# Patient Record
Sex: MALE | Race: WHITE | Employment: OTHER | URBAN - NONMETROPOLITAN AREA
[De-identification: names, ages, dates, MRNs, and addresses within clinical notes are randomized per-mention and may not be internally consistent; named-entity substitution may affect disease eponyms.]

---

## 2017-02-03 ENCOUNTER — HOSPITAL ENCOUNTER (OUTPATIENT)
Dept: LAB | Age: 82
Discharge: HOME | End: 2017-02-03
Attending: INTERNAL MEDICINE | Admitting: INTERNAL MEDICINE

## 2017-02-03 LAB
ALBUMIN SERPL-MCNC: 4.2 GM/DL (ref 3.4–5)
ALP BLD-CCNC: 41 IU/L (ref 40–129)
ALT SERPL-CCNC: 32 U/L (ref 10–40)
ANION GAP SERPL CALCULATED.3IONS-SCNC: 4 MMOL/L (ref 4–16)
AST SERPL-CCNC: 29 IU/L (ref 15–37)
BILIRUB SERPL-MCNC: 0.6 MG/DL (ref 0–1)
BUN BLDV-MCNC: 26 MG/DL (ref 6–23)
CALCIUM SERPL-MCNC: 10.4 MG/DL (ref 8.3–10.6)
CHLORIDE BLD-SCNC: 101 MMOL/L (ref 99–110)
CHOLESTEROL: 168 MG/DL
CO2: 33 MMOL/L (ref 21–32)
CREAT SERPL-MCNC: 1.2 MG/DL (ref 0.9–1.3)
GFR AFRICAN AMERICAN: >60 ML/MIN/1.73M2
GFR NON-AFRICAN AMERICAN: 58 ML/MIN/1.73M2
GLUCOSE FASTING: 91 MG/DL (ref 70–99)
HDLC SERPL-MCNC: 43 MG/DL
LDL CHOLESTEROL DIRECT: 106 MG/DL
POTASSIUM SERPL-SCNC: 4.1 MMOL/L (ref 3.5–5.1)
SODIUM BLD-SCNC: 138 MMOL/L (ref 135–145)
TOTAL PROTEIN: 6.5 GM/DL (ref 6.4–8.2)
TRIGL SERPL-MCNC: 221 MG/DL

## 2017-02-13 ENCOUNTER — OFFICE VISIT (OUTPATIENT)
Dept: INTERNAL MEDICINE CLINIC | Age: 82
End: 2017-02-13

## 2017-02-13 VITALS
SYSTOLIC BLOOD PRESSURE: 138 MMHG | HEIGHT: 71 IN | OXYGEN SATURATION: 96 % | DIASTOLIC BLOOD PRESSURE: 72 MMHG | HEART RATE: 76 BPM | RESPIRATION RATE: 16 BRPM | TEMPERATURE: 98.5 F | WEIGHT: 252.8 LBS | BODY MASS INDEX: 35.39 KG/M2

## 2017-02-13 DIAGNOSIS — M17.0 PRIMARY OSTEOARTHRITIS OF BOTH KNEES: ICD-10-CM

## 2017-02-13 DIAGNOSIS — I10 ESSENTIAL HYPERTENSION: Primary | ICD-10-CM

## 2017-02-13 DIAGNOSIS — M85.80 OSTEOPENIA: ICD-10-CM

## 2017-02-13 DIAGNOSIS — C43.9 MALIGNANT MELANOMA, UNSPECIFIED SITE (HCC): ICD-10-CM

## 2017-02-13 DIAGNOSIS — Z95.0 CARDIAC PACEMAKER: ICD-10-CM

## 2017-02-13 DIAGNOSIS — E78.2 MIXED HYPERLIPIDEMIA: ICD-10-CM

## 2017-02-13 PROCEDURE — G8484 FLU IMMUNIZE NO ADMIN: HCPCS | Performed by: INTERNAL MEDICINE

## 2017-02-13 PROCEDURE — G8427 DOCREV CUR MEDS BY ELIG CLIN: HCPCS | Performed by: INTERNAL MEDICINE

## 2017-02-13 PROCEDURE — 1123F ACP DISCUSS/DSCN MKR DOCD: CPT | Performed by: INTERNAL MEDICINE

## 2017-02-13 PROCEDURE — 1036F TOBACCO NON-USER: CPT | Performed by: INTERNAL MEDICINE

## 2017-02-13 PROCEDURE — 4040F PNEUMOC VAC/ADMIN/RCVD: CPT | Performed by: INTERNAL MEDICINE

## 2017-02-13 PROCEDURE — G8419 CALC BMI OUT NRM PARAM NOF/U: HCPCS | Performed by: INTERNAL MEDICINE

## 2017-02-13 PROCEDURE — 99213 OFFICE O/P EST LOW 20 MIN: CPT | Performed by: INTERNAL MEDICINE

## 2017-02-13 RX ORDER — FENOFIBRATE 160 MG/1
160 TABLET ORAL DAILY
Qty: 30 TABLET | Refills: 5 | Status: SHIPPED | OUTPATIENT
Start: 2017-02-13 | End: 2017-08-17 | Stop reason: SDUPTHER

## 2017-02-13 RX ORDER — OMEGA-3 FATTY ACIDS CAP DELAYED RELEASE 1000 MG 1000 MG
1000 CAPSULE DELAYED RELEASE ORAL 2 TIMES DAILY
Status: SHIPPED | COMMUNITY
Start: 2017-02-13

## 2017-02-13 RX ORDER — ATORVASTATIN CALCIUM 40 MG/1
40 TABLET, FILM COATED ORAL DAILY
Qty: 30 TABLET | Refills: 5 | Status: SHIPPED | OUTPATIENT
Start: 2017-02-13 | End: 2017-08-17 | Stop reason: SDUPTHER

## 2017-02-13 RX ORDER — LISINOPRIL AND HYDROCHLOROTHIAZIDE 20; 12.5 MG/1; MG/1
1 TABLET ORAL DAILY
Qty: 30 TABLET | Refills: 5 | Status: SHIPPED | OUTPATIENT
Start: 2017-02-13 | End: 2017-08-17 | Stop reason: SDUPTHER

## 2017-04-03 ENCOUNTER — TELEPHONE (OUTPATIENT)
Dept: CARDIOLOGY CLINIC | Age: 82
End: 2017-04-03

## 2017-04-07 ENCOUNTER — PROCEDURE VISIT (OUTPATIENT)
Dept: CARDIOLOGY CLINIC | Age: 82
End: 2017-04-07

## 2017-04-07 DIAGNOSIS — R00.1 SYMPTOMATIC BRADYCARDIA: Primary | ICD-10-CM

## 2017-04-07 DIAGNOSIS — Z95.0 CARDIAC PACEMAKER IN SITU: ICD-10-CM

## 2017-04-07 PROCEDURE — 93296 REM INTERROG EVL PM/IDS: CPT | Performed by: INTERNAL MEDICINE

## 2017-04-07 PROCEDURE — 93294 REM INTERROG EVL PM/LDLS PM: CPT | Performed by: INTERNAL MEDICINE

## 2017-05-02 ENCOUNTER — TELEPHONE (OUTPATIENT)
Dept: CARDIOLOGY CLINIC | Age: 82
End: 2017-05-02

## 2017-05-18 ENCOUNTER — OFFICE VISIT (OUTPATIENT)
Dept: INTERNAL MEDICINE CLINIC | Age: 82
End: 2017-05-18

## 2017-05-18 VITALS
WEIGHT: 251.8 LBS | HEART RATE: 80 BPM | HEIGHT: 71 IN | RESPIRATION RATE: 16 BRPM | SYSTOLIC BLOOD PRESSURE: 122 MMHG | TEMPERATURE: 98.4 F | BODY MASS INDEX: 35.25 KG/M2 | OXYGEN SATURATION: 96 % | DIASTOLIC BLOOD PRESSURE: 78 MMHG

## 2017-05-18 DIAGNOSIS — C43.9 MALIGNANT MELANOMA, UNSPECIFIED SITE (HCC): ICD-10-CM

## 2017-05-18 DIAGNOSIS — M47.816 SPONDYLOSIS OF LUMBAR REGION WITHOUT MYELOPATHY OR RADICULOPATHY: ICD-10-CM

## 2017-05-18 DIAGNOSIS — Z45.010 PACEMAKER AT END OF BATTERY LIFE: ICD-10-CM

## 2017-05-18 DIAGNOSIS — I10 ESSENTIAL HYPERTENSION: Primary | ICD-10-CM

## 2017-05-18 DIAGNOSIS — E78.2 MIXED HYPERLIPIDEMIA: ICD-10-CM

## 2017-05-18 DIAGNOSIS — M17.0 PRIMARY OSTEOARTHRITIS OF BOTH KNEES: ICD-10-CM

## 2017-05-18 DIAGNOSIS — M85.80 OSTEOPENIA: ICD-10-CM

## 2017-05-18 DIAGNOSIS — Z95.0 CARDIAC PACEMAKER: ICD-10-CM

## 2017-05-18 PROCEDURE — G8417 CALC BMI ABV UP PARAM F/U: HCPCS | Performed by: INTERNAL MEDICINE

## 2017-05-18 PROCEDURE — 1036F TOBACCO NON-USER: CPT | Performed by: INTERNAL MEDICINE

## 2017-05-18 PROCEDURE — 4040F PNEUMOC VAC/ADMIN/RCVD: CPT | Performed by: INTERNAL MEDICINE

## 2017-05-18 PROCEDURE — 1123F ACP DISCUSS/DSCN MKR DOCD: CPT | Performed by: INTERNAL MEDICINE

## 2017-05-18 PROCEDURE — G8427 DOCREV CUR MEDS BY ELIG CLIN: HCPCS | Performed by: INTERNAL MEDICINE

## 2017-05-18 PROCEDURE — 99213 OFFICE O/P EST LOW 20 MIN: CPT | Performed by: INTERNAL MEDICINE

## 2017-05-18 ASSESSMENT — ENCOUNTER SYMPTOMS
RESPIRATORY NEGATIVE: 1
BACK PAIN: 1
GASTROINTESTINAL NEGATIVE: 1
EYES NEGATIVE: 1

## 2017-07-16 ENCOUNTER — PROCEDURE VISIT (OUTPATIENT)
Dept: CARDIOLOGY CLINIC | Age: 82
End: 2017-07-16

## 2017-07-16 DIAGNOSIS — R00.1 SYMPTOMATIC BRADYCARDIA: Primary | ICD-10-CM

## 2017-07-16 DIAGNOSIS — Z95.0 CARDIAC PACEMAKER IN SITU: ICD-10-CM

## 2017-07-16 PROCEDURE — 93296 REM INTERROG EVL PM/IDS: CPT | Performed by: INTERNAL MEDICINE

## 2017-07-16 PROCEDURE — 93294 REM INTERROG EVL PM/LDLS PM: CPT | Performed by: INTERNAL MEDICINE

## 2017-07-31 ENCOUNTER — HOSPITAL ENCOUNTER (OUTPATIENT)
Dept: LAB | Age: 82
Discharge: HOME | End: 2017-07-31
Attending: INTERNAL MEDICINE | Admitting: INTERNAL MEDICINE

## 2017-07-31 LAB
ALBUMIN SERPL-MCNC: 4.1 GM/DL (ref 3.4–5)
ALP BLD-CCNC: 38 IU/L (ref 40–129)
ALT SERPL-CCNC: 25 U/L (ref 10–40)
ANION GAP SERPL CALCULATED.3IONS-SCNC: 10 MMOL/L (ref 4–16)
AST SERPL-CCNC: 25 IU/L (ref 15–37)
BASOPHILS ABSOLUTE: 0 K/CU MM
BASOPHILS RELATIVE PERCENT: 0.7 % (ref 0–1)
BILIRUB SERPL-MCNC: 0.5 MG/DL (ref 0–1)
BUN BLDV-MCNC: 24 MG/DL (ref 6–23)
CALCIUM SERPL-MCNC: 9.8 MG/DL (ref 8.3–10.6)
CHLORIDE BLD-SCNC: 102 MMOL/L (ref 99–110)
CHOLESTEROL, FASTING: 164 MG/DL
CO2: 27 MMOL/L (ref 21–32)
CREAT SERPL-MCNC: 1.3 MG/DL (ref 0.9–1.3)
DIFFERENTIAL TYPE: ABNORMAL
EOSINOPHILS ABSOLUTE: 0.2 K/CU MM
EOSINOPHILS RELATIVE PERCENT: 3.4 % (ref 0–3)
GFR AFRICAN AMERICAN: >60 ML/MIN/1.73M2
GFR NON-AFRICAN AMERICAN: 53 ML/MIN/1.73M2
GLUCOSE FASTING: 90 MG/DL (ref 70–99)
HCT VFR BLD CALC: 42.7 % (ref 42–52)
HDLC SERPL-MCNC: 42 MG/DL
HEMOGLOBIN: 14.3 GM/DL (ref 13.5–18)
IMMATURE NEUTROPHIL %: 0.4 % (ref 0–0.43)
LDL CHOLESTEROL DIRECT: 97 MG/DL
LYMPHOCYTES ABSOLUTE: 1.9 K/CU MM
LYMPHOCYTES RELATIVE PERCENT: 33 % (ref 24–44)
MCH RBC QN AUTO: 31.9 PG (ref 27–31)
MCHC RBC AUTO-ENTMCNC: 33.5 % (ref 32–36)
MCV RBC AUTO: 95.3 FL (ref 78–100)
MONOCYTES ABSOLUTE: 0.9 K/CU MM
MONOCYTES RELATIVE PERCENT: 15 % (ref 0–4)
PDW BLD-RTO: 12.4 % (ref 11.7–14.9)
PLATELET # BLD: 202 K/CU MM (ref 140–440)
PMV BLD AUTO: 10.1 FL (ref 7.5–11.1)
POTASSIUM SERPL-SCNC: 4 MMOL/L (ref 3.5–5.1)
RBC # BLD: 4.48 M/CU MM (ref 4.6–6.2)
SEGMENTED NEUTROPHILS ABSOLUTE COUNT: 2.7 K/CU MM
SEGMENTED NEUTROPHILS RELATIVE PERCENT: 47.5 % (ref 36–66)
SODIUM BLD-SCNC: 139 MMOL/L (ref 135–145)
TOTAL IMMATURE NEUTOROPHIL: 0.02 K/CU MM
TOTAL PROTEIN: 6.6 GM/DL (ref 6.4–8.2)
TRIGLYCERIDE, FASTING: 199 MG/DL
WBC # BLD: 5.7 K/CU MM (ref 4–10.5)

## 2017-08-17 ENCOUNTER — OFFICE VISIT (OUTPATIENT)
Dept: INTERNAL MEDICINE CLINIC | Age: 82
End: 2017-08-17

## 2017-08-17 VITALS
DIASTOLIC BLOOD PRESSURE: 72 MMHG | HEIGHT: 70 IN | WEIGHT: 239.4 LBS | HEART RATE: 88 BPM | RESPIRATION RATE: 16 BRPM | BODY MASS INDEX: 34.27 KG/M2 | OXYGEN SATURATION: 96 % | TEMPERATURE: 97.6 F | SYSTOLIC BLOOD PRESSURE: 136 MMHG

## 2017-08-17 DIAGNOSIS — C43.9 MALIGNANT MELANOMA, UNSPECIFIED SITE (HCC): ICD-10-CM

## 2017-08-17 DIAGNOSIS — I10 ESSENTIAL HYPERTENSION: Primary | ICD-10-CM

## 2017-08-17 DIAGNOSIS — M17.0 PRIMARY OSTEOARTHRITIS OF BOTH KNEES: ICD-10-CM

## 2017-08-17 DIAGNOSIS — M85.80 OSTEOPENIA, UNSPECIFIED LOCATION: ICD-10-CM

## 2017-08-17 DIAGNOSIS — E78.2 MIXED HYPERLIPIDEMIA: ICD-10-CM

## 2017-08-17 DIAGNOSIS — Z95.0 CARDIAC PACEMAKER: ICD-10-CM

## 2017-08-17 DIAGNOSIS — Z45.010 PACEMAKER AT END OF BATTERY LIFE: ICD-10-CM

## 2017-08-17 PROCEDURE — 99213 OFFICE O/P EST LOW 20 MIN: CPT | Performed by: INTERNAL MEDICINE

## 2017-08-17 PROCEDURE — 4040F PNEUMOC VAC/ADMIN/RCVD: CPT | Performed by: INTERNAL MEDICINE

## 2017-08-17 PROCEDURE — 1036F TOBACCO NON-USER: CPT | Performed by: INTERNAL MEDICINE

## 2017-08-17 PROCEDURE — G8427 DOCREV CUR MEDS BY ELIG CLIN: HCPCS | Performed by: INTERNAL MEDICINE

## 2017-08-17 PROCEDURE — G8417 CALC BMI ABV UP PARAM F/U: HCPCS | Performed by: INTERNAL MEDICINE

## 2017-08-17 PROCEDURE — 1123F ACP DISCUSS/DSCN MKR DOCD: CPT | Performed by: INTERNAL MEDICINE

## 2017-08-17 RX ORDER — FENOFIBRATE 160 MG/1
160 TABLET ORAL DAILY
Qty: 30 TABLET | Refills: 5 | Status: SHIPPED | OUTPATIENT
Start: 2017-08-17 | End: 2018-02-28 | Stop reason: SDUPTHER

## 2017-08-17 RX ORDER — ATORVASTATIN CALCIUM 40 MG/1
40 TABLET, FILM COATED ORAL DAILY
Qty: 30 TABLET | Refills: 5 | Status: SHIPPED | OUTPATIENT
Start: 2017-08-17 | End: 2018-02-28 | Stop reason: SDUPTHER

## 2017-08-17 RX ORDER — LISINOPRIL AND HYDROCHLOROTHIAZIDE 20; 12.5 MG/1; MG/1
1 TABLET ORAL DAILY
Qty: 30 TABLET | Refills: 5 | Status: SHIPPED | OUTPATIENT
Start: 2017-08-17 | End: 2018-02-28 | Stop reason: SDUPTHER

## 2017-08-17 ASSESSMENT — ENCOUNTER SYMPTOMS
EYES NEGATIVE: 1
BACK PAIN: 1
RESPIRATORY NEGATIVE: 1
GASTROINTESTINAL NEGATIVE: 1
COUGH: 0
VOMITING: 0
NAUSEA: 0
HEARTBURN: 0

## 2017-08-29 ENCOUNTER — OFFICE VISIT (OUTPATIENT)
Dept: CARDIOLOGY CLINIC | Age: 82
End: 2017-08-29

## 2017-08-29 VITALS
WEIGHT: 240 LBS | HEIGHT: 70 IN | HEART RATE: 60 BPM | BODY MASS INDEX: 34.36 KG/M2 | RESPIRATION RATE: 16 BRPM | DIASTOLIC BLOOD PRESSURE: 84 MMHG | SYSTOLIC BLOOD PRESSURE: 152 MMHG

## 2017-08-29 DIAGNOSIS — I10 ESSENTIAL HYPERTENSION: ICD-10-CM

## 2017-08-29 DIAGNOSIS — E78.2 MIXED HYPERLIPIDEMIA: Primary | ICD-10-CM

## 2017-08-29 DIAGNOSIS — M17.0 PRIMARY OSTEOARTHRITIS OF BOTH KNEES: ICD-10-CM

## 2017-08-29 DIAGNOSIS — Z95.0 CARDIAC PACEMAKER: ICD-10-CM

## 2017-08-29 PROCEDURE — 99214 OFFICE O/P EST MOD 30 MIN: CPT | Performed by: INTERNAL MEDICINE

## 2017-08-29 PROCEDURE — G8427 DOCREV CUR MEDS BY ELIG CLIN: HCPCS | Performed by: INTERNAL MEDICINE

## 2017-08-29 PROCEDURE — G8417 CALC BMI ABV UP PARAM F/U: HCPCS | Performed by: INTERNAL MEDICINE

## 2017-08-29 PROCEDURE — 4040F PNEUMOC VAC/ADMIN/RCVD: CPT | Performed by: INTERNAL MEDICINE

## 2017-08-29 PROCEDURE — 1123F ACP DISCUSS/DSCN MKR DOCD: CPT | Performed by: INTERNAL MEDICINE

## 2017-08-29 PROCEDURE — 1036F TOBACCO NON-USER: CPT | Performed by: INTERNAL MEDICINE

## 2017-10-26 ENCOUNTER — TELEPHONE (OUTPATIENT)
Dept: CARDIOLOGY CLINIC | Age: 82
End: 2017-10-26

## 2017-10-28 ENCOUNTER — PROCEDURE VISIT (OUTPATIENT)
Dept: CARDIOLOGY CLINIC | Age: 82
End: 2017-10-28

## 2017-10-28 DIAGNOSIS — Z95.0 CARDIAC PACEMAKER IN SITU: ICD-10-CM

## 2017-10-28 DIAGNOSIS — R00.1 SYMPTOMATIC BRADYCARDIA: Primary | ICD-10-CM

## 2017-10-28 PROCEDURE — 93294 REM INTERROG EVL PM/LDLS PM: CPT | Performed by: INTERNAL MEDICINE

## 2017-10-28 PROCEDURE — 93296 REM INTERROG EVL PM/IDS: CPT | Performed by: INTERNAL MEDICINE

## 2017-10-30 ENCOUNTER — TELEPHONE (OUTPATIENT)
Dept: CARDIOLOGY CLINIC | Age: 82
End: 2017-10-30

## 2017-11-28 ENCOUNTER — OFFICE VISIT (OUTPATIENT)
Dept: INTERNAL MEDICINE CLINIC | Age: 82
End: 2017-11-28

## 2017-11-28 VITALS
HEART RATE: 76 BPM | HEIGHT: 70 IN | SYSTOLIC BLOOD PRESSURE: 122 MMHG | BODY MASS INDEX: 33.99 KG/M2 | WEIGHT: 237.4 LBS | DIASTOLIC BLOOD PRESSURE: 70 MMHG | TEMPERATURE: 98.4 F | RESPIRATION RATE: 12 BRPM | OXYGEN SATURATION: 95 %

## 2017-11-28 DIAGNOSIS — E78.2 MIXED HYPERLIPIDEMIA: ICD-10-CM

## 2017-11-28 DIAGNOSIS — Z95.0 CARDIAC PACEMAKER: ICD-10-CM

## 2017-11-28 DIAGNOSIS — M19.90 ARTHRITIS: ICD-10-CM

## 2017-11-28 DIAGNOSIS — C43.9 MALIGNANT MELANOMA, UNSPECIFIED SITE (HCC): ICD-10-CM

## 2017-11-28 DIAGNOSIS — M85.80 OSTEOPENIA, UNSPECIFIED LOCATION: ICD-10-CM

## 2017-11-28 DIAGNOSIS — I10 ESSENTIAL HYPERTENSION: ICD-10-CM

## 2017-11-28 PROCEDURE — 99213 OFFICE O/P EST LOW 20 MIN: CPT | Performed by: INTERNAL MEDICINE

## 2017-11-28 PROCEDURE — G8484 FLU IMMUNIZE NO ADMIN: HCPCS | Performed by: INTERNAL MEDICINE

## 2017-11-28 PROCEDURE — G8417 CALC BMI ABV UP PARAM F/U: HCPCS | Performed by: INTERNAL MEDICINE

## 2017-11-28 PROCEDURE — 3288F FALL RISK ASSESSMENT DOCD: CPT | Performed by: INTERNAL MEDICINE

## 2017-11-28 PROCEDURE — 4040F PNEUMOC VAC/ADMIN/RCVD: CPT | Performed by: INTERNAL MEDICINE

## 2017-11-28 PROCEDURE — 1123F ACP DISCUSS/DSCN MKR DOCD: CPT | Performed by: INTERNAL MEDICINE

## 2017-11-28 PROCEDURE — 1036F TOBACCO NON-USER: CPT | Performed by: INTERNAL MEDICINE

## 2017-11-28 PROCEDURE — G8510 SCR DEP NEG, NO PLAN REQD: HCPCS | Performed by: INTERNAL MEDICINE

## 2017-11-28 PROCEDURE — G8427 DOCREV CUR MEDS BY ELIG CLIN: HCPCS | Performed by: INTERNAL MEDICINE

## 2017-11-28 ASSESSMENT — PATIENT HEALTH QUESTIONNAIRE - PHQ9
SUM OF ALL RESPONSES TO PHQ QUESTIONS 1-9: 0
SUM OF ALL RESPONSES TO PHQ9 QUESTIONS 1 & 2: 0
2. FEELING DOWN, DEPRESSED OR HOPELESS: 0
1. LITTLE INTEREST OR PLEASURE IN DOING THINGS: 0

## 2017-11-28 ASSESSMENT — ENCOUNTER SYMPTOMS
EYES NEGATIVE: 1
GASTROINTESTINAL NEGATIVE: 1
RESPIRATORY NEGATIVE: 1

## 2017-11-28 NOTE — PROGRESS NOTES
Mando Villegas  Patient's  is 1935  Seen in office on 2017      SUBJECTIVE:  Angella Valenzuela is a [de-identified] y. o.year old male presents today   Chief Complaint   Patient presents with    Hypertension    Hyperlipidemia     Pt is here for f/u of HTN and HLD  He is doing well. Has no complaints  Patient has hypertension. Taking medications No headaches, no chest pain, no palpitations and no dizziness. Patient has hyperlipidemia. Taking medications. No abdominal pain, no nausea or vomiting. No myalgias. Taking medications regularly. No side effects noted. Review of Systems   Constitutional: Negative. HENT: Negative. Eyes: Negative. Respiratory: Negative. Cardiovascular: Negative for chest pain and palpitations. Gastrointestinal: Negative. Genitourinary: Negative. Musculoskeletal: Negative. Skin: Negative. Neurological: Negative. Endo/Heme/Allergies: Negative. OBJECTIVE: /70 (Site: Left Arm, Position: Sitting)   Pulse 76   Temp 98.4 °F (36.9 °C)   Resp 12   Ht 5' 10\" (1.778 m) Comment: w shoes  Wt 237 lb 6.4 oz (107.7 kg)   SpO2 95%   BMI 34.06 kg/m²     Wt Readings from Last 3 Encounters:   17 237 lb 6.4 oz (107.7 kg)   17 240 lb (108.9 kg)   17 239 lb 6.4 oz (108.6 kg)      GENERAL:  Alert, oriented, pleasant, in no apparent distress. HEENT:  Conjunctiva pink, no scleral icterus. ENT clear. NECK:  Supple. No jugular venous distention noted. No masses felt,  CARDIOVASCULAR:  Normal S1 and S2    PULMONARY:  No respiratory distress. No wheezes or rales. ABDOMEN:  Soft and non-tender,no masses  or organomegaly. EXTREMITIES:  No cyanosis, clubbing, or significant edema. SKIN: Skin is warm and dry. NEUROLOGICAL:  Cranial nerves II through XII are grossly intact. IMPRESSION:    Encounter Diagnoses   Name Primary?     Essential hypertension     Mixed hyperlipidemia     Cardiac pacemaker     Osteopenia, unspecified location Comment: reviewed 6/2/15    Alcohol use No       LAB REVIEW:  CBC:   Lab Results   Component Value Date    WBC 5.7 07/31/2017    HGB 14.3 07/31/2017    HCT 42.7 07/31/2017     07/31/2017     Lipids:   Lab Results   Component Value Date    CHOL 168 02/03/2017    TRIG 221 (H) 02/03/2017    HDL 42 07/31/2017    LDLDIRECT 97 07/31/2017    TRIGLYCFAST 199 (H) 07/31/2017    CHOLESTFAST 164 07/31/2017     Renal:   Lab Results   Component Value Date    BUN 24 07/31/2017    CREATININE 1.3 07/31/2017     07/31/2017    K 4.0 07/31/2017    ALT 25 07/31/2017    AST 25 07/31/2017    GLUCOSE 94 09/07/2016     PT/INR:   Lab Results   Component Value Date    INR 0.96 08/01/2016     A1C: No results found for: Omaira Brambila MD, 11/28/2017 , 1:19 PM

## 2017-11-28 NOTE — ASSESSMENT & PLAN NOTE
-takes Calcium + vitamin D3  2000 units a day  -bone density in 2/16 osteoprosis  Patient is stable. Continue current treatment.

## 2017-11-28 NOTE — ASSESSMENT & PLAN NOTE
Hyperlipidemia is stable. Follow low cholesterol diet.  Continue current treatment.  -takes lipitor, omega-3 and fenofibrate

## 2018-01-28 ENCOUNTER — PROCEDURE VISIT (OUTPATIENT)
Dept: CARDIOLOGY CLINIC | Age: 83
End: 2018-01-28

## 2018-01-28 DIAGNOSIS — Z95.0 CARDIAC PACEMAKER IN SITU: ICD-10-CM

## 2018-01-28 DIAGNOSIS — R00.1 SYMPTOMATIC BRADYCARDIA: Primary | ICD-10-CM

## 2018-01-28 PROCEDURE — 93296 REM INTERROG EVL PM/IDS: CPT | Performed by: INTERNAL MEDICINE

## 2018-01-28 PROCEDURE — 93294 REM INTERROG EVL PM/LDLS PM: CPT | Performed by: INTERNAL MEDICINE

## 2018-02-14 ENCOUNTER — HOSPITAL ENCOUNTER (OUTPATIENT)
Dept: LAB | Age: 83
Discharge: HOME | End: 2018-02-14
Attending: INTERNAL MEDICINE | Admitting: INTERNAL MEDICINE

## 2018-02-14 LAB
ALBUMIN SERPL-MCNC: 4.2 GM/DL (ref 3.4–5)
ALP BLD-CCNC: 37 IU/L (ref 40–129)
ALT SERPL-CCNC: 29 U/L (ref 10–40)
ANION GAP SERPL CALCULATED.3IONS-SCNC: 9 MMOL/L (ref 4–16)
AST SERPL-CCNC: 26 IU/L (ref 15–37)
BILIRUB SERPL-MCNC: 0.5 MG/DL (ref 0–1)
BUN BLDV-MCNC: 21 MG/DL (ref 6–23)
CALCIUM SERPL-MCNC: 10 MG/DL (ref 8.3–10.6)
CHLORIDE BLD-SCNC: 102 MMOL/L (ref 99–110)
CHOLESTEROL, FASTING: 178 MG/DL
CO2: 31 MMOL/L (ref 21–32)
CREAT SERPL-MCNC: 1.2 MG/DL (ref 0.9–1.3)
GFR AFRICAN AMERICAN: >60 ML/MIN/1.73M2
GFR NON-AFRICAN AMERICAN: 58 ML/MIN/1.73M2
GLUCOSE FASTING: 92 MG/DL (ref 70–99)
HDLC SERPL-MCNC: 39 MG/DL
LDL CHOLESTEROL DIRECT: 105 MG/DL
POTASSIUM SERPL-SCNC: 4 MMOL/L (ref 3.5–5.1)
SODIUM BLD-SCNC: 142 MMOL/L (ref 135–145)
TOTAL PROTEIN: 7 GM/DL (ref 6.4–8.2)
TRIGLYCERIDE, FASTING: 236 MG/DL

## 2018-02-28 ENCOUNTER — OFFICE VISIT (OUTPATIENT)
Dept: INTERNAL MEDICINE CLINIC | Age: 83
End: 2018-02-28

## 2018-02-28 VITALS
OXYGEN SATURATION: 93 % | DIASTOLIC BLOOD PRESSURE: 78 MMHG | WEIGHT: 238.8 LBS | TEMPERATURE: 97.8 F | SYSTOLIC BLOOD PRESSURE: 118 MMHG | RESPIRATION RATE: 16 BRPM | BODY MASS INDEX: 34.26 KG/M2 | HEART RATE: 86 BPM

## 2018-02-28 DIAGNOSIS — I10 ESSENTIAL HYPERTENSION: Primary | ICD-10-CM

## 2018-02-28 DIAGNOSIS — E78.2 MIXED HYPERLIPIDEMIA: ICD-10-CM

## 2018-02-28 DIAGNOSIS — M85.80 OSTEOPENIA, UNSPECIFIED LOCATION: ICD-10-CM

## 2018-02-28 DIAGNOSIS — C43.9 MALIGNANT MELANOMA, UNSPECIFIED SITE (HCC): ICD-10-CM

## 2018-02-28 DIAGNOSIS — M17.0 PRIMARY OSTEOARTHRITIS OF BOTH KNEES: ICD-10-CM

## 2018-02-28 DIAGNOSIS — Z95.0 CARDIAC PACEMAKER: ICD-10-CM

## 2018-02-28 PROCEDURE — 1036F TOBACCO NON-USER: CPT | Performed by: INTERNAL MEDICINE

## 2018-02-28 PROCEDURE — 99213 OFFICE O/P EST LOW 20 MIN: CPT | Performed by: INTERNAL MEDICINE

## 2018-02-28 PROCEDURE — G8484 FLU IMMUNIZE NO ADMIN: HCPCS | Performed by: INTERNAL MEDICINE

## 2018-02-28 PROCEDURE — G8417 CALC BMI ABV UP PARAM F/U: HCPCS | Performed by: INTERNAL MEDICINE

## 2018-02-28 PROCEDURE — G8427 DOCREV CUR MEDS BY ELIG CLIN: HCPCS | Performed by: INTERNAL MEDICINE

## 2018-02-28 PROCEDURE — 1123F ACP DISCUSS/DSCN MKR DOCD: CPT | Performed by: INTERNAL MEDICINE

## 2018-02-28 PROCEDURE — 4040F PNEUMOC VAC/ADMIN/RCVD: CPT | Performed by: INTERNAL MEDICINE

## 2018-02-28 RX ORDER — FENOFIBRATE 160 MG/1
160 TABLET ORAL DAILY
Qty: 30 TABLET | Refills: 5 | Status: SHIPPED | OUTPATIENT
Start: 2018-02-28 | End: 2018-08-20 | Stop reason: SDUPTHER

## 2018-02-28 RX ORDER — LISINOPRIL AND HYDROCHLOROTHIAZIDE 20; 12.5 MG/1; MG/1
1 TABLET ORAL DAILY
Qty: 30 TABLET | Refills: 5 | Status: SHIPPED | OUTPATIENT
Start: 2018-02-28 | End: 2018-08-13 | Stop reason: SDUPTHER

## 2018-02-28 RX ORDER — ATORVASTATIN CALCIUM 40 MG/1
40 TABLET, FILM COATED ORAL DAILY
Qty: 30 TABLET | Refills: 5 | Status: SHIPPED | OUTPATIENT
Start: 2018-02-28 | End: 2018-08-13 | Stop reason: SDUPTHER

## 2018-02-28 ASSESSMENT — ENCOUNTER SYMPTOMS
RESPIRATORY NEGATIVE: 1
EYES NEGATIVE: 1
GASTROINTESTINAL NEGATIVE: 1

## 2018-02-28 NOTE — ASSESSMENT & PLAN NOTE
Hyperlipidemia is stable. Follow low cholesterol diet. Continue current treatment.  -takes lipitor, omega-3 and fenofibrate  Lipid shows tri are 236.  Chol 178

## 2018-02-28 NOTE — ASSESSMENT & PLAN NOTE
Hypertension in control. Follow low salt diet. Continue current treatment.   Patient is on metoprolol and lisinopril with hydrochlorothiazide

## 2018-05-29 ENCOUNTER — OFFICE VISIT (OUTPATIENT)
Dept: INTERNAL MEDICINE CLINIC | Age: 83
End: 2018-05-29

## 2018-05-29 VITALS
DIASTOLIC BLOOD PRESSURE: 68 MMHG | TEMPERATURE: 98.2 F | OXYGEN SATURATION: 96 % | BODY MASS INDEX: 34.95 KG/M2 | SYSTOLIC BLOOD PRESSURE: 126 MMHG | WEIGHT: 243.6 LBS | RESPIRATION RATE: 14 BRPM | HEART RATE: 83 BPM

## 2018-05-29 DIAGNOSIS — E78.2 MIXED HYPERLIPIDEMIA: ICD-10-CM

## 2018-05-29 DIAGNOSIS — I10 ESSENTIAL HYPERTENSION: Primary | ICD-10-CM

## 2018-05-29 DIAGNOSIS — Z95.0 CARDIAC PACEMAKER: ICD-10-CM

## 2018-05-29 DIAGNOSIS — M85.80 OSTEOPENIA, UNSPECIFIED LOCATION: ICD-10-CM

## 2018-05-29 DIAGNOSIS — M17.0 PRIMARY OSTEOARTHRITIS OF BOTH KNEES: ICD-10-CM

## 2018-05-29 DIAGNOSIS — M19.90 ARTHRITIS: ICD-10-CM

## 2018-05-29 DIAGNOSIS — C43.9 MALIGNANT MELANOMA, UNSPECIFIED SITE (HCC): ICD-10-CM

## 2018-05-29 PROCEDURE — 1123F ACP DISCUSS/DSCN MKR DOCD: CPT | Performed by: INTERNAL MEDICINE

## 2018-05-29 PROCEDURE — G8427 DOCREV CUR MEDS BY ELIG CLIN: HCPCS | Performed by: INTERNAL MEDICINE

## 2018-05-29 PROCEDURE — 4040F PNEUMOC VAC/ADMIN/RCVD: CPT | Performed by: INTERNAL MEDICINE

## 2018-05-29 PROCEDURE — 1036F TOBACCO NON-USER: CPT | Performed by: INTERNAL MEDICINE

## 2018-05-29 PROCEDURE — 99213 OFFICE O/P EST LOW 20 MIN: CPT | Performed by: INTERNAL MEDICINE

## 2018-05-29 PROCEDURE — G8417 CALC BMI ABV UP PARAM F/U: HCPCS | Performed by: INTERNAL MEDICINE

## 2018-05-29 RX ORDER — CALCITONIN SALMON 200 [IU]/.09ML
1 SPRAY, METERED NASAL DAILY
Qty: 1 BOTTLE | Refills: 3 | Status: SHIPPED | OUTPATIENT
Start: 2018-05-29 | End: 2019-02-26 | Stop reason: SDUPTHER

## 2018-05-29 ASSESSMENT — ENCOUNTER SYMPTOMS
SHORTNESS OF BREATH: 0
COUGH: 0
SPUTUM PRODUCTION: 0
BACK PAIN: 0
GASTROINTESTINAL NEGATIVE: 1
EYES NEGATIVE: 1

## 2018-05-31 ENCOUNTER — TELEPHONE (OUTPATIENT)
Dept: CARDIOLOGY CLINIC | Age: 83
End: 2018-05-31

## 2018-06-07 ENCOUNTER — PROCEDURE VISIT (OUTPATIENT)
Dept: CARDIOLOGY CLINIC | Age: 83
End: 2018-06-07

## 2018-06-07 DIAGNOSIS — Z95.0 CARDIAC PACEMAKER IN SITU: ICD-10-CM

## 2018-06-07 DIAGNOSIS — R00.1 SYMPTOMATIC BRADYCARDIA: Primary | ICD-10-CM

## 2018-06-07 PROCEDURE — 93294 REM INTERROG EVL PM/LDLS PM: CPT | Performed by: INTERNAL MEDICINE

## 2018-06-07 PROCEDURE — 93296 REM INTERROG EVL PM/IDS: CPT | Performed by: INTERNAL MEDICINE

## 2018-07-13 ENCOUNTER — TELEPHONE (OUTPATIENT)
Dept: CARDIOLOGY CLINIC | Age: 83
End: 2018-07-13

## 2018-07-20 ENCOUNTER — HOSPITAL ENCOUNTER (OUTPATIENT)
Dept: LAB | Age: 83
Discharge: HOME | End: 2018-07-20
Attending: INTERNAL MEDICINE | Admitting: INTERNAL MEDICINE

## 2018-07-20 LAB
ALBUMIN SERPL-MCNC: 4.1 GM/DL (ref 3.4–5)
ALP BLD-CCNC: 44 IU/L (ref 40–129)
ALT SERPL-CCNC: 28 U/L (ref 10–40)
ANION GAP SERPL CALCULATED.3IONS-SCNC: 12 MMOL/L (ref 4–16)
AST SERPL-CCNC: 28 IU/L (ref 15–37)
BASOPHILS ABSOLUTE: 0 K/CU MM
BASOPHILS RELATIVE PERCENT: 0.3 % (ref 0–1)
BILIRUB SERPL-MCNC: 0.4 MG/DL (ref 0–1)
BUN BLDV-MCNC: 28 MG/DL (ref 6–23)
CALCIUM SERPL-MCNC: 9.7 MG/DL (ref 8.3–10.6)
CHLORIDE BLD-SCNC: 102 MMOL/L (ref 99–110)
CHOLESTEROL, FASTING: 160 MG/DL
CO2: 26 MMOL/L (ref 21–32)
CREAT SERPL-MCNC: 1.3 MG/DL (ref 0.9–1.3)
DIFFERENTIAL TYPE: ABNORMAL
EOSINOPHILS ABSOLUTE: 0.2 K/CU MM
EOSINOPHILS RELATIVE PERCENT: 1.5 % (ref 0–3)
GFR AFRICAN AMERICAN: >60 ML/MIN/1.73M2
GFR NON-AFRICAN AMERICAN: 53 ML/MIN/1.73M2
GLUCOSE FASTING: 98 MG/DL (ref 70–99)
HCT VFR BLD CALC: 42.9 % (ref 42–52)
HDLC SERPL-MCNC: 43 MG/DL
HEMOGLOBIN: 14.4 GM/DL (ref 13.5–18)
IMMATURE NEUTROPHIL %: 0.4 % (ref 0–0.43)
LDL CHOLESTEROL DIRECT: 97 MG/DL
LYMPHOCYTES ABSOLUTE: 1.7 K/CU MM
LYMPHOCYTES RELATIVE PERCENT: 15 % (ref 24–44)
MCH RBC QN AUTO: 32.4 PG (ref 27–31)
MCHC RBC AUTO-ENTMCNC: 33.6 % (ref 32–36)
MCV RBC AUTO: 96.4 FL (ref 78–100)
MONOCYTES ABSOLUTE: 1.3 K/CU MM
MONOCYTES RELATIVE PERCENT: 12.1 % (ref 0–4)
PDW BLD-RTO: 12.8 % (ref 11.7–14.9)
PLATELET # BLD: 210 K/CU MM (ref 140–440)
PMV BLD AUTO: 10.1 FL (ref 7.5–11.1)
POTASSIUM SERPL-SCNC: 4.6 MMOL/L (ref 3.5–5.1)
RBC # BLD: 4.45 M/CU MM (ref 4.6–6.2)
SEGMENTED NEUTROPHILS ABSOLUTE COUNT: 7.8 K/CU MM
SEGMENTED NEUTROPHILS RELATIVE PERCENT: 70.7 % (ref 36–66)
SODIUM BLD-SCNC: 140 MMOL/L (ref 135–145)
TOTAL IMMATURE NEUTOROPHIL: 0.04 K/CU MM
TOTAL PROTEIN: 6.5 GM/DL (ref 6.4–8.2)
TRIGLYCERIDE, FASTING: 161 MG/DL
WBC # BLD: 11 K/CU MM (ref 4–10.5)

## 2018-08-13 RX ORDER — ATORVASTATIN CALCIUM 40 MG/1
40 TABLET, FILM COATED ORAL DAILY
Qty: 90 TABLET | Refills: 1 | Status: SHIPPED | OUTPATIENT
Start: 2018-08-13 | End: 2019-02-15 | Stop reason: SDUPTHER

## 2018-08-13 RX ORDER — LISINOPRIL AND HYDROCHLOROTHIAZIDE 20; 12.5 MG/1; MG/1
1 TABLET ORAL DAILY
Qty: 90 TABLET | Refills: 1 | Status: SHIPPED | OUTPATIENT
Start: 2018-08-13 | End: 2019-02-15 | Stop reason: SDUPTHER

## 2018-08-20 RX ORDER — FENOFIBRATE 160 MG/1
160 TABLET ORAL DAILY
Qty: 90 TABLET | Refills: 1 | Status: SHIPPED | OUTPATIENT
Start: 2018-08-20 | End: 2019-02-15 | Stop reason: SDUPTHER

## 2018-08-28 ENCOUNTER — OFFICE VISIT (OUTPATIENT)
Dept: INTERNAL MEDICINE CLINIC | Age: 83
End: 2018-08-28

## 2018-08-28 ENCOUNTER — OFFICE VISIT (OUTPATIENT)
Dept: CARDIOLOGY CLINIC | Age: 83
End: 2018-08-28

## 2018-08-28 VITALS
HEIGHT: 70 IN | RESPIRATION RATE: 12 BRPM | OXYGEN SATURATION: 95 % | WEIGHT: 236 LBS | BODY MASS INDEX: 33.79 KG/M2 | DIASTOLIC BLOOD PRESSURE: 82 MMHG | SYSTOLIC BLOOD PRESSURE: 122 MMHG | HEART RATE: 83 BPM

## 2018-08-28 VITALS
RESPIRATION RATE: 16 BRPM | HEIGHT: 70 IN | DIASTOLIC BLOOD PRESSURE: 70 MMHG | WEIGHT: 235 LBS | SYSTOLIC BLOOD PRESSURE: 108 MMHG | BODY MASS INDEX: 33.64 KG/M2 | HEART RATE: 72 BPM

## 2018-08-28 DIAGNOSIS — D72.821 MONOCYTOSIS: ICD-10-CM

## 2018-08-28 DIAGNOSIS — M19.90 ARTHRITIS: ICD-10-CM

## 2018-08-28 DIAGNOSIS — I10 ESSENTIAL HYPERTENSION: Primary | ICD-10-CM

## 2018-08-28 DIAGNOSIS — Z95.0 CARDIAC PACEMAKER: Primary | ICD-10-CM

## 2018-08-28 DIAGNOSIS — C43.9 MALIGNANT MELANOMA, UNSPECIFIED SITE (HCC): ICD-10-CM

## 2018-08-28 DIAGNOSIS — Z95.0 CARDIAC PACEMAKER: ICD-10-CM

## 2018-08-28 DIAGNOSIS — M85.80 OSTEOPENIA, UNSPECIFIED LOCATION: ICD-10-CM

## 2018-08-28 DIAGNOSIS — I10 ESSENTIAL HYPERTENSION: ICD-10-CM

## 2018-08-28 DIAGNOSIS — E78.2 MIXED HYPERLIPIDEMIA: ICD-10-CM

## 2018-08-28 DIAGNOSIS — M17.0 PRIMARY OSTEOARTHRITIS OF BOTH KNEES: ICD-10-CM

## 2018-08-28 DIAGNOSIS — N18.30 STAGE 3 CHRONIC KIDNEY DISEASE (HCC): ICD-10-CM

## 2018-08-28 PROCEDURE — 1123F ACP DISCUSS/DSCN MKR DOCD: CPT | Performed by: INTERNAL MEDICINE

## 2018-08-28 PROCEDURE — 1101F PT FALLS ASSESS-DOCD LE1/YR: CPT | Performed by: INTERNAL MEDICINE

## 2018-08-28 PROCEDURE — 4040F PNEUMOC VAC/ADMIN/RCVD: CPT | Performed by: INTERNAL MEDICINE

## 2018-08-28 PROCEDURE — G8417 CALC BMI ABV UP PARAM F/U: HCPCS | Performed by: INTERNAL MEDICINE

## 2018-08-28 PROCEDURE — 1036F TOBACCO NON-USER: CPT | Performed by: INTERNAL MEDICINE

## 2018-08-28 PROCEDURE — 99214 OFFICE O/P EST MOD 30 MIN: CPT | Performed by: INTERNAL MEDICINE

## 2018-08-28 PROCEDURE — 99213 OFFICE O/P EST LOW 20 MIN: CPT | Performed by: INTERNAL MEDICINE

## 2018-08-28 PROCEDURE — G8427 DOCREV CUR MEDS BY ELIG CLIN: HCPCS | Performed by: INTERNAL MEDICINE

## 2018-08-28 ASSESSMENT — ENCOUNTER SYMPTOMS
GASTROINTESTINAL NEGATIVE: 1
EYES NEGATIVE: 1
RESPIRATORY NEGATIVE: 1

## 2018-08-28 NOTE — ASSESSMENT & PLAN NOTE
-takes Calcium + vitamin D3  2000 units a day.  Had fosamax in the past.  -bone density in 2/16 osteoprosis  -on miaclacin nasal spray

## 2018-08-28 NOTE — PROGRESS NOTES
unspecified location     Arthritis     Malignant melanoma, unspecified site ([facility] [address]     Stage 3 chronic kidney disease     Monocytosis        ASSESSMENT/PLAN:    Essential hypertension  Hypertension in control. Follow low salt diet. Continue current treatment. Patient is on metoprolol and lisinopril with hydrochlorothiazide    Mixed hyperlipidemia  Hyperlipidemia is stable. Follow low cholesterol diet. Continue current treatment.  -takes lipitor, omega-3 and fenofibrate    Cardiac pacemaker  For Mobitz type II AV block 11/2006. Sees Dr. Rambo Barnes in  8/3/16    Osteopenia  -takes Calcium + vitamin D3  2000 units a day. Had fosamax in the past.  -bone density in 2/16 osteoprosis  -on miaclacin nasal spray    Arthritis  Doing well. Both shoulder and knees are doing good for few days  Takes aleve prn. Melanoma ([facility] [address] of neck and upper back  -Right side of neck below the right ear 4/16  -Upper back lesion in 4/16. Both melanoma per pt. Seen Dr. Derek Nathan in [facility]. [place]  -Excised completely per pt. Sees dermatologist q year. Stage 3 chronic kidney disease  Cr 1.3  Doing well. Monocytosis : pt has increase monocytosis . Will check with pathologist if it is correct. Several other pt also has increased monocytosis. Return to office in 3 months. Orders Placed This Encounter   Procedures    CBC Auto Differential           Mediations reviewed with the patient. Continue current medications. Appropriate prescriptions are addressed. After visit summery provided. Follow up as directed sooner if needed. Questions answered and patient verbalizes understanding. Call for any problems, questions, or concerns.        No Known Allergies  Current Outpatient Prescriptions   Medication Sig Dispense Refill    metoprolol tartrate (LOPRESSOR) 25 MG tablet Take 1 tablet by mouth 2 times daily 180 tablet 1    fenofibrate 160 MG tablet Take 1 tablet by mouth daily 90 tablet 1    lisinopril-hydrochlorothiazide (PRINZIDE;ZESTORETIC) 20-12.5 MG per tablet Take 1 tablet by mouth daily 90 tablet 1    atorvastatin (LIPITOR) 40 MG tablet Take 1 tablet by mouth daily 90 tablet 1    calcitonin (MIACALCIN) 200 UNIT/ACT nasal spray 1 spray by Nasal route daily 1 Bottle 3    Omega-3 Fatty Acids (FISH OIL) 1000 MG CPDR Take 1 capsule by mouth 2 times daily      Cholecalciferol (VITAMIN D3) 2000 UNITS CAPS Take 2,000 Units by mouth daily.  aspirin 81 MG tablet Take 81 mg by mouth daily.  Calcium Citrate (CITRACAL PO) Take  by mouth daily.  Multiple Vitamin (MULTIVITAMIN PO) Take  by mouth daily.  GLUCOSAMINE HCL Take  by mouth daily. No current facility-administered medications for this visit. Past Medical History:   Diagnosis Date    Arthritis     right shoulder and knees    DJD (degenerative joint disease) of knee 6/3/2014    H/O 24 hour EKG monitoring 11/28/06    Abnormal-LBBB with second degree mobitz type 2    H/O cardiovascular stress test 10/17/01    Abnormal-apical ischemia in the distribution of distal LAD EF40%    H/O colonoscopy 2004, 7/09    Dr. Lacy Amador H/O echocardiogram 12/12/06    Mild pulmonary hypertension,mild aortic root dilation EF 50-55%    History of pacemaker 12/14/2006    Medtronic    Hyperlipidemia     Hypertension     Melanoma ([address] of neck and upper back 5/3/2016    Right side of neck below the right ear Upper back. Seen Dr. Dipika Juarez in [facility]. [place] Excised completely per pt.  Mobitz (type) II atrioventricular block 11/28/06    Osteopenia     Pacemaker at end of battery life 08/03/2016    Primary osteoarthritis of both knees 2/3/2016    DJD of both knees. No surgery.  Seen Ortho at [facility] in the past     Stage 3 chronic kidney disease 8/28/2018     Past Surgical History:   Procedure Laterality Date    FRACTURE SURGERY      Had acute accident broken sternum & brused heart 1953-Lt leg plate, [id number] wrist x 2, ?-Rt shoulder x 2, ?-Lt hip fracture with plates and rods    PACEMAKER PLACEMENT  12/14/2006    PACEMAKER PLACEMENT  08/03/2016    Generator Changeout for End of Life    SKIN CANCER EXCISION  3/2015     Social History   Substance Use Topics    Smoking status: Former Smoker     Packs/day: 3.00     Years: 6.00     Start date: 8/15/1961     Quit date: 10/27/1965    Smokeless tobacco: Never Used    Alcohol use No       LAB REVIEW:  CBC:   Lab Results   Component Value Date    WBC 11.0 07/20/2018    HGB 14.4 07/20/2018    HCT 42.9 07/20/2018     07/20/2018     Lipids:   Lab Results   Component Value Date    CHOL 168 02/03/2017    TRIG 221 (H) 02/03/2017    HDL 43 07/20/2018    LDLDIRECT 97 07/20/2018    TRIGLYCFAST 161 (H) 07/20/2018     Renal:   Lab Results   Component Value Date    BUN 28 07/20/2018    CREATININE 1.3 07/20/2018     07/20/2018    K 4.6 07/20/2018    ALT 28 07/20/2018    AST 28 07/20/2018    GLUCOSE 94 09/07/2016           Claudia Grant MD, 8/28/2018 , 1:26 PM

## 2018-08-28 NOTE — ASSESSMENT & PLAN NOTE
-Right side of neck below the right ear 4/16  -Upper back lesion in 4/16. Both melanoma per pt. Seen Dr. Ruthann Limon in [place]. [place]  -Excised completely per pt. Sees dermatologist q year.

## 2018-09-05 ENCOUNTER — HOSPITAL ENCOUNTER (OUTPATIENT)
Dept: LAB | Age: 83
Discharge: HOME | End: 2018-09-05
Attending: INTERNAL MEDICINE | Admitting: INTERNAL MEDICINE

## 2018-09-05 LAB
BASOPHILS ABSOLUTE: 0 K/CU MM
BASOPHILS RELATIVE PERCENT: 0.6 % (ref 0–1)
DIFFERENTIAL TYPE: ABNORMAL
EOSINOPHILS ABSOLUTE: 0.2 K/CU MM
EOSINOPHILS RELATIVE PERCENT: 3 % (ref 0–3)
HCT VFR BLD CALC: 43.9 % (ref 42–52)
HEMOGLOBIN: 14.4 GM/DL (ref 13.5–18)
IMMATURE NEUTROPHIL %: 0.3 % (ref 0–0.43)
LYMPHOCYTES ABSOLUTE: 2.1 K/CU MM
LYMPHOCYTES RELATIVE PERCENT: 29.9 % (ref 24–44)
MCH RBC QN AUTO: 31.7 PG (ref 27–31)
MCHC RBC AUTO-ENTMCNC: 32.8 % (ref 32–36)
MCV RBC AUTO: 96.7 FL (ref 78–100)
MONOCYTES ABSOLUTE: 1 K/CU MM
MONOCYTES RELATIVE PERCENT: 13.8 % (ref 0–4)
PDW BLD-RTO: 12.2 % (ref 11.7–14.9)
PLATELET # BLD: 214 K/CU MM (ref 140–440)
PMV BLD AUTO: 10.1 FL (ref 7.5–11.1)
RBC # BLD: 4.54 M/CU MM (ref 4.6–6.2)
SEGMENTED NEUTROPHILS ABSOLUTE COUNT: 3.7 K/CU MM
SEGMENTED NEUTROPHILS RELATIVE PERCENT: 52.4 % (ref 36–66)
TOTAL IMMATURE NEUTOROPHIL: 0.02 K/CU MM
WBC # BLD: 7 K/CU MM (ref 4–10.5)

## 2018-09-18 ENCOUNTER — PROCEDURE VISIT (OUTPATIENT)
Dept: CARDIOLOGY CLINIC | Age: 83
End: 2018-09-18

## 2018-09-18 ENCOUNTER — TELEPHONE (OUTPATIENT)
Dept: CARDIOLOGY CLINIC | Age: 83
End: 2018-09-18

## 2018-09-18 DIAGNOSIS — R00.1 SYMPTOMATIC BRADYCARDIA: Primary | ICD-10-CM

## 2018-09-18 DIAGNOSIS — Z95.0 CARDIAC PACEMAKER IN SITU: ICD-10-CM

## 2018-09-18 PROCEDURE — 93296 REM INTERROG EVL PM/IDS: CPT | Performed by: INTERNAL MEDICINE

## 2018-09-18 PROCEDURE — 93294 REM INTERROG EVL PM/LDLS PM: CPT | Performed by: INTERNAL MEDICINE

## 2018-11-28 ENCOUNTER — OFFICE VISIT (OUTPATIENT)
Dept: INTERNAL MEDICINE CLINIC | Age: 83
End: 2018-11-28
Payer: MEDICARE

## 2018-11-28 VITALS
HEIGHT: 71 IN | OXYGEN SATURATION: 94 % | WEIGHT: 232.6 LBS | HEART RATE: 69 BPM | DIASTOLIC BLOOD PRESSURE: 70 MMHG | SYSTOLIC BLOOD PRESSURE: 124 MMHG | BODY MASS INDEX: 32.56 KG/M2 | TEMPERATURE: 98.1 F

## 2018-11-28 DIAGNOSIS — C43.9 MALIGNANT MELANOMA, UNSPECIFIED SITE (HCC): ICD-10-CM

## 2018-11-28 DIAGNOSIS — E78.2 MIXED HYPERLIPIDEMIA: ICD-10-CM

## 2018-11-28 DIAGNOSIS — Z95.0 CARDIAC PACEMAKER: ICD-10-CM

## 2018-11-28 DIAGNOSIS — M85.80 OSTEOPENIA, UNSPECIFIED LOCATION: ICD-10-CM

## 2018-11-28 DIAGNOSIS — M17.0 PRIMARY OSTEOARTHRITIS OF BOTH KNEES: ICD-10-CM

## 2018-11-28 DIAGNOSIS — N18.30 STAGE 3 CHRONIC KIDNEY DISEASE (HCC): ICD-10-CM

## 2018-11-28 DIAGNOSIS — I10 ESSENTIAL HYPERTENSION: Primary | ICD-10-CM

## 2018-11-28 DIAGNOSIS — D72.821 MONOCYTOSIS: ICD-10-CM

## 2018-11-28 PROCEDURE — 99213 OFFICE O/P EST LOW 20 MIN: CPT | Performed by: INTERNAL MEDICINE

## 2018-11-28 ASSESSMENT — PATIENT HEALTH QUESTIONNAIRE - PHQ9
SUM OF ALL RESPONSES TO PHQ QUESTIONS 1-9: 0
SUM OF ALL RESPONSES TO PHQ9 QUESTIONS 1 & 2: 0
1. LITTLE INTEREST OR PLEASURE IN DOING THINGS: 0
SUM OF ALL RESPONSES TO PHQ QUESTIONS 1-9: 0
2. FEELING DOWN, DEPRESSED OR HOPELESS: 0

## 2018-12-28 ENCOUNTER — TELEPHONE (OUTPATIENT)
Dept: CARDIOLOGY CLINIC | Age: 83
End: 2018-12-28

## 2018-12-28 ENCOUNTER — PROCEDURE VISIT (OUTPATIENT)
Dept: CARDIOLOGY CLINIC | Age: 83
End: 2018-12-28
Payer: MEDICARE

## 2018-12-28 DIAGNOSIS — Z95.0 CARDIAC PACEMAKER IN SITU: ICD-10-CM

## 2018-12-28 DIAGNOSIS — R00.1 SYMPTOMATIC BRADYCARDIA: Primary | ICD-10-CM

## 2019-01-03 PROCEDURE — 93294 REM INTERROG EVL PM/LDLS PM: CPT | Performed by: INTERNAL MEDICINE

## 2019-01-03 PROCEDURE — 93296 REM INTERROG EVL PM/IDS: CPT | Performed by: INTERNAL MEDICINE

## 2019-02-12 ENCOUNTER — NURSE ONLY (OUTPATIENT)
Dept: INTERNAL MEDICINE CLINIC | Age: 84
End: 2019-02-12
Payer: MEDICARE

## 2019-02-12 DIAGNOSIS — E78.2 MIXED HYPERLIPIDEMIA: ICD-10-CM

## 2019-02-12 DIAGNOSIS — I10 ESSENTIAL HYPERTENSION: ICD-10-CM

## 2019-02-12 DIAGNOSIS — N18.30 STAGE 3 CHRONIC KIDNEY DISEASE (HCC): ICD-10-CM

## 2019-02-12 LAB
A/G RATIO: 2 (ref 1.1–2.2)
ALBUMIN SERPL-MCNC: 4.3 G/DL (ref 3.4–5)
ALP BLD-CCNC: 36 U/L (ref 40–129)
ALT SERPL-CCNC: 23 U/L (ref 10–40)
ANION GAP SERPL CALCULATED.3IONS-SCNC: 11 MMOL/L (ref 3–16)
AST SERPL-CCNC: 23 U/L (ref 15–37)
BASOPHILS ABSOLUTE: 0 K/UL (ref 0–0.2)
BASOPHILS RELATIVE PERCENT: 0.5 %
BILIRUB SERPL-MCNC: 0.5 MG/DL (ref 0–1)
BUN BLDV-MCNC: 25 MG/DL (ref 7–20)
CALCIUM SERPL-MCNC: 9.9 MG/DL (ref 8.3–10.6)
CHLORIDE BLD-SCNC: 105 MMOL/L (ref 99–110)
CHOLESTEROL, FASTING: 151 MG/DL (ref 0–199)
CO2: 24 MMOL/L (ref 21–32)
CREAT SERPL-MCNC: 1.1 MG/DL (ref 0.8–1.3)
EOSINOPHILS ABSOLUTE: 0.2 K/UL (ref 0–0.6)
EOSINOPHILS RELATIVE PERCENT: 2.7 %
GFR AFRICAN AMERICAN: >60
GFR NON-AFRICAN AMERICAN: >60
GLOBULIN: 2.1 G/DL
GLUCOSE BLD-MCNC: 89 MG/DL (ref 70–99)
HCT VFR BLD CALC: 42.5 % (ref 40.5–52.5)
HDLC SERPL-MCNC: 42 MG/DL (ref 40–60)
HEMOGLOBIN: 14.6 G/DL (ref 13.5–17.5)
LDL CHOLESTEROL CALCULATED: 76 MG/DL
LYMPHOCYTES ABSOLUTE: 1.9 K/UL (ref 1–5.1)
LYMPHOCYTES RELATIVE PERCENT: 25.7 %
MCH RBC QN AUTO: 32.9 PG (ref 26–34)
MCHC RBC AUTO-ENTMCNC: 34.4 G/DL (ref 31–36)
MCV RBC AUTO: 95.6 FL (ref 80–100)
MONOCYTES ABSOLUTE: 0.8 K/UL (ref 0–1.3)
MONOCYTES RELATIVE PERCENT: 10.3 %
NEUTROPHILS ABSOLUTE: 4.4 K/UL (ref 1.7–7.7)
NEUTROPHILS RELATIVE PERCENT: 60.8 %
PDW BLD-RTO: 13 % (ref 12.4–15.4)
PLATELET # BLD: 213 K/UL (ref 135–450)
PMV BLD AUTO: 8.8 FL (ref 5–10.5)
POTASSIUM SERPL-SCNC: 4.1 MMOL/L (ref 3.5–5.1)
RBC # BLD: 4.45 M/UL (ref 4.2–5.9)
SODIUM BLD-SCNC: 140 MMOL/L (ref 136–145)
TOTAL PROTEIN: 6.4 G/DL (ref 6.4–8.2)
TRIGLYCERIDE, FASTING: 163 MG/DL (ref 0–150)
VLDLC SERPL CALC-MCNC: 33 MG/DL
WBC # BLD: 7.3 K/UL (ref 4–11)

## 2019-02-12 PROCEDURE — 36415 COLL VENOUS BLD VENIPUNCTURE: CPT | Performed by: INTERNAL MEDICINE

## 2019-02-15 RX ORDER — LISINOPRIL AND HYDROCHLOROTHIAZIDE 20; 12.5 MG/1; MG/1
1 TABLET ORAL DAILY
Qty: 90 TABLET | Refills: 1 | Status: SHIPPED | OUTPATIENT
Start: 2019-02-15 | End: 2019-08-14 | Stop reason: SDUPTHER

## 2019-02-15 RX ORDER — ATORVASTATIN CALCIUM 40 MG/1
40 TABLET, FILM COATED ORAL DAILY
Qty: 90 TABLET | Refills: 1 | Status: SHIPPED | OUTPATIENT
Start: 2019-02-15 | End: 2019-08-14 | Stop reason: SDUPTHER

## 2019-02-15 RX ORDER — FENOFIBRATE 160 MG/1
160 TABLET ORAL DAILY
Qty: 90 TABLET | Refills: 1 | Status: SHIPPED | OUTPATIENT
Start: 2019-02-15 | End: 2019-08-14 | Stop reason: SDUPTHER

## 2019-02-26 ENCOUNTER — OFFICE VISIT (OUTPATIENT)
Dept: INTERNAL MEDICINE CLINIC | Age: 84
End: 2019-02-26
Payer: MEDICARE

## 2019-02-26 VITALS
OXYGEN SATURATION: 96 % | SYSTOLIC BLOOD PRESSURE: 126 MMHG | TEMPERATURE: 98.3 F | WEIGHT: 234.6 LBS | BODY MASS INDEX: 33.19 KG/M2 | DIASTOLIC BLOOD PRESSURE: 76 MMHG | RESPIRATION RATE: 16 BRPM | HEART RATE: 68 BPM

## 2019-02-26 DIAGNOSIS — Z95.0 CARDIAC PACEMAKER: ICD-10-CM

## 2019-02-26 DIAGNOSIS — N18.30 STAGE 3 CHRONIC KIDNEY DISEASE (HCC): ICD-10-CM

## 2019-02-26 DIAGNOSIS — D72.821 MONOCYTOSIS: ICD-10-CM

## 2019-02-26 DIAGNOSIS — C43.9 MALIGNANT MELANOMA, UNSPECIFIED SITE (HCC): ICD-10-CM

## 2019-02-26 DIAGNOSIS — E78.2 MIXED HYPERLIPIDEMIA: ICD-10-CM

## 2019-02-26 DIAGNOSIS — M17.0 PRIMARY OSTEOARTHRITIS OF BOTH KNEES: ICD-10-CM

## 2019-02-26 DIAGNOSIS — I10 ESSENTIAL HYPERTENSION: Primary | ICD-10-CM

## 2019-02-26 DIAGNOSIS — M85.80 OSTEOPENIA, UNSPECIFIED LOCATION: ICD-10-CM

## 2019-02-26 PROCEDURE — 1123F ACP DISCUSS/DSCN MKR DOCD: CPT | Performed by: INTERNAL MEDICINE

## 2019-02-26 PROCEDURE — 1036F TOBACCO NON-USER: CPT | Performed by: INTERNAL MEDICINE

## 2019-02-26 PROCEDURE — 1101F PT FALLS ASSESS-DOCD LE1/YR: CPT | Performed by: INTERNAL MEDICINE

## 2019-02-26 PROCEDURE — 4040F PNEUMOC VAC/ADMIN/RCVD: CPT | Performed by: INTERNAL MEDICINE

## 2019-02-26 PROCEDURE — 99213 OFFICE O/P EST LOW 20 MIN: CPT | Performed by: INTERNAL MEDICINE

## 2019-02-26 PROCEDURE — G8427 DOCREV CUR MEDS BY ELIG CLIN: HCPCS | Performed by: INTERNAL MEDICINE

## 2019-02-26 PROCEDURE — G8417 CALC BMI ABV UP PARAM F/U: HCPCS | Performed by: INTERNAL MEDICINE

## 2019-02-26 PROCEDURE — G8484 FLU IMMUNIZE NO ADMIN: HCPCS | Performed by: INTERNAL MEDICINE

## 2019-02-26 RX ORDER — CALCITONIN SALMON 200 [IU]/.09ML
1 SPRAY, METERED NASAL DAILY
Qty: 1 BOTTLE | Refills: 3 | Status: SHIPPED | OUTPATIENT
Start: 2019-02-26 | End: 2020-02-10 | Stop reason: SDUPTHER

## 2019-02-26 ASSESSMENT — PATIENT HEALTH QUESTIONNAIRE - PHQ9
SUM OF ALL RESPONSES TO PHQ9 QUESTIONS 1 & 2: 0
1. LITTLE INTEREST OR PLEASURE IN DOING THINGS: 0
SUM OF ALL RESPONSES TO PHQ QUESTIONS 1-9: 0
SUM OF ALL RESPONSES TO PHQ QUESTIONS 1-9: 0
2. FEELING DOWN, DEPRESSED OR HOPELESS: 0

## 2019-02-26 ASSESSMENT — ENCOUNTER SYMPTOMS
GASTROINTESTINAL NEGATIVE: 1
RESPIRATORY NEGATIVE: 1
EYES NEGATIVE: 1
ALLERGIC/IMMUNOLOGIC NEGATIVE: 1

## 2019-04-02 ENCOUNTER — TELEPHONE (OUTPATIENT)
Dept: CARDIOLOGY CLINIC | Age: 84
End: 2019-04-02

## 2019-04-02 NOTE — TELEPHONE ENCOUNTER
[facility]link transmission scheduled for 4/2/19 not received. Called and spoke w/patient; he forgot but states he will send a transmission.

## 2019-04-03 ENCOUNTER — PROCEDURE VISIT (OUTPATIENT)
Dept: CARDIOLOGY CLINIC | Age: 84
End: 2019-04-03

## 2019-04-03 DIAGNOSIS — Z95.0 CARDIAC PACEMAKER IN SITU: ICD-10-CM

## 2019-04-03 DIAGNOSIS — R00.1 SYMPTOMATIC BRADYCARDIA: Primary | ICD-10-CM

## 2019-07-09 ENCOUNTER — PROCEDURE VISIT (OUTPATIENT)
Dept: CARDIOLOGY CLINIC | Age: 84
End: 2019-07-09
Payer: MEDICARE

## 2019-07-09 DIAGNOSIS — I44.0 AV BLOCK, 1ST DEGREE: ICD-10-CM

## 2019-07-09 DIAGNOSIS — Z95.0 CARDIAC PACEMAKER IN SITU: Primary | ICD-10-CM

## 2019-07-09 DIAGNOSIS — I49.5 SINUS NODE DYSFUNCTION (HCC): ICD-10-CM

## 2019-07-09 PROCEDURE — 93294 REM INTERROG EVL PM/LDLS PM: CPT | Performed by: INTERNAL MEDICINE

## 2019-07-09 PROCEDURE — 93296 REM INTERROG EVL PM/IDS: CPT | Performed by: INTERNAL MEDICINE

## 2019-08-14 RX ORDER — LISINOPRIL AND HYDROCHLOROTHIAZIDE 20; 12.5 MG/1; MG/1
1 TABLET ORAL DAILY
Qty: 90 TABLET | Refills: 1 | Status: SHIPPED | OUTPATIENT
Start: 2019-08-14 | End: 2020-02-10 | Stop reason: SDUPTHER

## 2019-08-14 RX ORDER — ATORVASTATIN CALCIUM 40 MG/1
40 TABLET, FILM COATED ORAL DAILY
Qty: 90 TABLET | Refills: 1 | Status: SHIPPED | OUTPATIENT
Start: 2019-08-14 | End: 2020-02-10 | Stop reason: SDUPTHER

## 2019-08-14 RX ORDER — FENOFIBRATE 160 MG/1
160 TABLET ORAL DAILY
Qty: 90 TABLET | Refills: 1 | Status: SHIPPED | OUTPATIENT
Start: 2019-08-14 | End: 2020-02-10 | Stop reason: SDUPTHER

## 2019-08-27 ENCOUNTER — OFFICE VISIT (OUTPATIENT)
Dept: CARDIOLOGY CLINIC | Age: 84
End: 2019-08-27
Payer: MEDICARE

## 2019-08-27 ENCOUNTER — OFFICE VISIT (OUTPATIENT)
Dept: INTERNAL MEDICINE CLINIC | Age: 84
End: 2019-08-27
Payer: MEDICARE

## 2019-08-27 ENCOUNTER — TELEPHONE (OUTPATIENT)
Dept: CARDIOLOGY CLINIC | Age: 84
End: 2019-08-27

## 2019-08-27 VITALS
HEART RATE: 74 BPM | SYSTOLIC BLOOD PRESSURE: 112 MMHG | DIASTOLIC BLOOD PRESSURE: 64 MMHG | BODY MASS INDEX: 34.06 KG/M2 | TEMPERATURE: 98.7 F | RESPIRATION RATE: 16 BRPM | OXYGEN SATURATION: 97 % | WEIGHT: 237.4 LBS

## 2019-08-27 VITALS
DIASTOLIC BLOOD PRESSURE: 64 MMHG | HEIGHT: 70 IN | BODY MASS INDEX: 33.79 KG/M2 | RESPIRATION RATE: 16 BRPM | WEIGHT: 236 LBS | HEART RATE: 64 BPM | SYSTOLIC BLOOD PRESSURE: 128 MMHG

## 2019-08-27 DIAGNOSIS — M85.80 OSTEOPENIA, UNSPECIFIED LOCATION: ICD-10-CM

## 2019-08-27 DIAGNOSIS — D72.821 MONOCYTOSIS: ICD-10-CM

## 2019-08-27 DIAGNOSIS — E78.2 MIXED HYPERLIPIDEMIA: ICD-10-CM

## 2019-08-27 DIAGNOSIS — M19.90 ARTHRITIS: ICD-10-CM

## 2019-08-27 DIAGNOSIS — N18.30 STAGE 3 CHRONIC KIDNEY DISEASE (HCC): ICD-10-CM

## 2019-08-27 DIAGNOSIS — Z95.0 CARDIAC PACEMAKER: ICD-10-CM

## 2019-08-27 DIAGNOSIS — Z95.0 CARDIAC PACEMAKER: Primary | ICD-10-CM

## 2019-08-27 DIAGNOSIS — I10 ESSENTIAL HYPERTENSION: Primary | ICD-10-CM

## 2019-08-27 DIAGNOSIS — C43.9 MALIGNANT MELANOMA, UNSPECIFIED SITE (HCC): ICD-10-CM

## 2019-08-27 DIAGNOSIS — I10 ESSENTIAL HYPERTENSION: ICD-10-CM

## 2019-08-27 PROCEDURE — 1036F TOBACCO NON-USER: CPT | Performed by: INTERNAL MEDICINE

## 2019-08-27 PROCEDURE — G8417 CALC BMI ABV UP PARAM F/U: HCPCS | Performed by: INTERNAL MEDICINE

## 2019-08-27 PROCEDURE — 99213 OFFICE O/P EST LOW 20 MIN: CPT | Performed by: INTERNAL MEDICINE

## 2019-08-27 PROCEDURE — 4040F PNEUMOC VAC/ADMIN/RCVD: CPT | Performed by: INTERNAL MEDICINE

## 2019-08-27 PROCEDURE — 99214 OFFICE O/P EST MOD 30 MIN: CPT | Performed by: INTERNAL MEDICINE

## 2019-08-27 PROCEDURE — G8427 DOCREV CUR MEDS BY ELIG CLIN: HCPCS | Performed by: INTERNAL MEDICINE

## 2019-08-27 PROCEDURE — 1123F ACP DISCUSS/DSCN MKR DOCD: CPT | Performed by: INTERNAL MEDICINE

## 2019-08-27 ASSESSMENT — ENCOUNTER SYMPTOMS
GASTROINTESTINAL NEGATIVE: 1
RESPIRATORY NEGATIVE: 1
ALLERGIC/IMMUNOLOGIC NEGATIVE: 1
EYES NEGATIVE: 1

## 2019-08-27 NOTE — PROGRESS NOTES
sensory or motor deficit.     Skin:  Warm and well perfused.       7/9/2019 Pacer analysis is reviewed is consistent with normal dual-chamber MRI safe Medtronic Advisa pacer function with stable leads and appropriate battery status for the age of the device. Remaining average battery life is 6.5 years. Device is 93% APVS.    LAB REVIEW:    CBC:   Lab Results   Component Value Date    WBC 7.3 02/12/2019    HGB 14.6 02/12/2019    HCT 42.5 02/12/2019     02/12/2019     Lipids:   Cholesterol, Fasting 151  0 - 199 mg/dL Final 02/12/2019 10:00 AM [address] [facility]   Triglyceride, Fasting 163High   0 - 150 mg/dL Final 02/12/2019 10:00 AM [address] [facility]   HDL 42  40 - 60 mg/dL Final 02/12/2019 10:00 AM [address] [facility]   LDL Calculated 76  <100 mg/dL Final 02/12/2019 10:00 AM [address] [facility]   VLDL Cholesterol Calculated 33          Renal:   Lab Results   Component Value Date    BUN 25 02/12/2019    CREATININE 1.1 02/12/2019     02/12/2019    K 4.1 02/12/2019     PT/INR:   Lab Results   Component Value Date    INR 0.96 08/01/2016     IMPRESSION and RECOMMENDATIONS:      Mixed hyperlipidemia  Well controlled on current medications, reviewed individually with patient. Cardiac pacemaker  Continue device check as per care link schedule. Essential hypertension  Well controled. Continue current medication. Continue current cardiovascular medications which have been reviewed and discussed individually with you. Primary prevention is the goal by aggressive risk modification, healthy and therapeutic life style changes for cardiovascular risk reduction. Various goals are discussed and questions answered. Counseled for calorie counting, reduction in serving size and exercise and lifestyle modification for weight loss. Appropriate prescriptions if needed on this visit are addressed. After visit summery is provided. Questions answered and patient verbalizes understanding.  Follow

## 2019-08-27 NOTE — PROGRESS NOTES
PLACEMENT  2006    PACEMAKER PLACEMENT  2016    Generator Changeout for End of Life    SKIN CANCER EXCISION  3/2015     Social History     Tobacco Use    Smoking status: Former Smoker     Packs/day: 3.00     Years: 6.00     Pack years: 18.00     Start date: 8/15/1961     Last attempt to quit: 10/27/1965     Years since quittin.8    Smokeless tobacco: Never Used   Substance Use Topics    Alcohol use: No     Alcohol/week: 0.0 standard drinks       LAB REVIEW:  CBC:   Lab Results   Component Value Date    WBC 7.3 2019    HGB 14.6 2019    HCT 42.5 2019     2019     Lipids:   Lab Results   Component Value Date    CHOL 168 2017    TRIG 221 (H) 2017    HDL 42 2019    LDLCALC 76 2019    LDLDIRECT 97 2018    TRIGLYCFAST 163 (H) 2019     Renal:   Lab Results   Component Value Date    BUN 25 2019    CREATININE 1.1 2019     2019    K 4.1 2019    ALT 23 2019    AST 23 2019    GLUCOSE 89 2019     PT/INR:   Lab Results   Component Value Date    INR 0.96 2016     A1C: No results found for: Lindsey Matos MD, 2019 , 3:45 PM

## 2019-10-10 ENCOUNTER — HOSPITAL ENCOUNTER (OUTPATIENT)
Age: 84
Discharge: HOME | End: 2019-10-10
Payer: MEDICARE

## 2019-10-10 DIAGNOSIS — E78.2 MIXED HYPERLIPIDEMIA: ICD-10-CM

## 2019-10-10 DIAGNOSIS — I10 ESSENTIAL HYPERTENSION: ICD-10-CM

## 2019-10-10 DIAGNOSIS — D72.821 MONOCYTOSIS: ICD-10-CM

## 2019-10-10 LAB
ALBUMIN SERPL-MCNC: 4.3 GM/DL (ref 3.4–5)
ALP BLD-CCNC: 37 IU/L (ref 40–129)
ALT SERPL-CCNC: 5 U/L (ref 10–40)
ANION GAP SERPL CALCULATED.3IONS-SCNC: 17 MMOL/L (ref 4–16)
ANION GAP SERPL CALCULATED.3IONS-SCNC: ABNORMAL MMOL/L (ref 4–16)
AST SERPL-CCNC: 28 IU/L (ref 15–37)
BASOPHILS ABSOLUTE: 0 K/CU MM
BASOPHILS RELATIVE PERCENT: 0.4 % (ref 0–1)
BILIRUB SERPL-MCNC: 0.5 MG/DL (ref 0–1)
BUN BLDV-MCNC: 26 MG/DL (ref 6–23)
CALCIUM SERPL-MCNC: 10.4 MG/DL (ref 8.3–10.6)
CHLORIDE BLD-SCNC: 103 MMOL/L (ref 99–110)
CHOLESTEROL, FASTING: 176 MG/DL
CO2: 22 MMOL/L (ref 21–32)
CO2: ABNORMAL MMOL/L (ref 21–32)
CREAT SERPL-MCNC: 1.3 MG/DL (ref 0.9–1.3)
DIFFERENTIAL TYPE: ABNORMAL
EOSINOPHILS ABSOLUTE: 0.2 K/CU MM
EOSINOPHILS RELATIVE PERCENT: 2.6 % (ref 0–3)
GFR AFRICAN AMERICAN: >60 ML/MIN/1.73M2
GFR NON-AFRICAN AMERICAN: 53 ML/MIN/1.73M2
GLUCOSE BLD-MCNC: 87 MG/DL (ref 70–99)
HCT VFR BLD CALC: 43.5 % (ref 42–52)
HDLC SERPL-MCNC: 41 MG/DL
HEMOGLOBIN: 14.5 GM/DL (ref 13.5–18)
IMMATURE NEUTROPHIL %: 0.6 % (ref 0–0.43)
LDL CHOLESTEROL DIRECT: 106 MG/DL
LYMPHOCYTES ABSOLUTE: 2.2 K/CU MM
LYMPHOCYTES RELATIVE PERCENT: 31.5 % (ref 24–44)
MCH RBC QN AUTO: 32.4 PG (ref 27–31)
MCHC RBC AUTO-ENTMCNC: 33.3 % (ref 32–36)
MCV RBC AUTO: 97.3 FL (ref 78–100)
MONOCYTES ABSOLUTE: 1 K/CU MM
MONOCYTES RELATIVE PERCENT: 14.9 % (ref 0–4)
PDW BLD-RTO: 12.7 % (ref 11.7–14.9)
PLATELET # BLD: 227 K/CU MM (ref 140–440)
PMV BLD AUTO: 9.8 FL (ref 7.5–11.1)
POTASSIUM SERPL-SCNC: 3.9 MMOL/L (ref 3.5–5.1)
RBC # BLD: 4.47 M/CU MM (ref 4.6–6.2)
SEGMENTED NEUTROPHILS ABSOLUTE COUNT: 3.4 K/CU MM
SEGMENTED NEUTROPHILS RELATIVE PERCENT: 50 % (ref 36–66)
SODIUM BLD-SCNC: 142 MMOL/L (ref 135–145)
TOTAL IMMATURE NEUTOROPHIL: 0.04 K/CU MM
TOTAL PROTEIN: 7 GM/DL (ref 6.4–8.2)
TRIGLYCERIDE, FASTING: 195 MG/DL
WBC # BLD: 6.9 K/CU MM (ref 4–10.5)

## 2019-10-10 PROCEDURE — 85025 COMPLETE CBC W/AUTO DIFF WBC: CPT

## 2019-10-10 PROCEDURE — 36415 COLL VENOUS BLD VENIPUNCTURE: CPT

## 2019-10-10 PROCEDURE — 80053 COMPREHEN METABOLIC PANEL: CPT

## 2019-10-10 PROCEDURE — 80061 LIPID PANEL: CPT

## 2019-11-04 ENCOUNTER — TELEPHONE (OUTPATIENT)
Dept: CARDIOLOGY CLINIC | Age: 84
End: 2019-11-04

## 2019-11-14 ENCOUNTER — PROCEDURE VISIT (OUTPATIENT)
Dept: CARDIOLOGY CLINIC | Age: 84
End: 2019-11-14
Payer: MEDICARE

## 2019-11-14 DIAGNOSIS — R00.1 SYMPTOMATIC BRADYCARDIA: Primary | ICD-10-CM

## 2019-11-14 DIAGNOSIS — I44.0 AV BLOCK, 1ST DEGREE: ICD-10-CM

## 2019-11-14 DIAGNOSIS — Z95.0 CARDIAC PACEMAKER IN SITU: ICD-10-CM

## 2019-11-14 DIAGNOSIS — I49.5 SINUS NODE DYSFUNCTION (HCC): ICD-10-CM

## 2019-11-14 PROCEDURE — 93294 REM INTERROG EVL PM/LDLS PM: CPT | Performed by: INTERNAL MEDICINE

## 2019-11-14 PROCEDURE — 93296 REM INTERROG EVL PM/IDS: CPT | Performed by: INTERNAL MEDICINE

## 2019-11-18 ENCOUNTER — TELEPHONE (OUTPATIENT)
Dept: CARDIOLOGY CLINIC | Age: 84
End: 2019-11-18

## 2019-11-25 ENCOUNTER — OFFICE VISIT (OUTPATIENT)
Dept: INTERNAL MEDICINE CLINIC | Age: 84
End: 2019-11-25
Payer: MEDICARE

## 2019-11-25 VITALS
RESPIRATION RATE: 16 BRPM | SYSTOLIC BLOOD PRESSURE: 132 MMHG | TEMPERATURE: 98.4 F | HEART RATE: 76 BPM | OXYGEN SATURATION: 96 % | DIASTOLIC BLOOD PRESSURE: 70 MMHG | WEIGHT: 236.6 LBS | BODY MASS INDEX: 33.95 KG/M2

## 2019-11-25 DIAGNOSIS — E78.2 MIXED HYPERLIPIDEMIA: ICD-10-CM

## 2019-11-25 DIAGNOSIS — M17.0 PRIMARY OSTEOARTHRITIS OF BOTH KNEES: ICD-10-CM

## 2019-11-25 DIAGNOSIS — M85.80 OSTEOPENIA, UNSPECIFIED LOCATION: ICD-10-CM

## 2019-11-25 DIAGNOSIS — I10 ESSENTIAL HYPERTENSION: ICD-10-CM

## 2019-11-25 DIAGNOSIS — N18.30 STAGE 3 CHRONIC KIDNEY DISEASE (HCC): ICD-10-CM

## 2019-11-25 DIAGNOSIS — Z95.0 CARDIAC PACEMAKER: ICD-10-CM

## 2019-11-25 PROCEDURE — 1036F TOBACCO NON-USER: CPT | Performed by: INTERNAL MEDICINE

## 2019-11-25 PROCEDURE — G8484 FLU IMMUNIZE NO ADMIN: HCPCS | Performed by: INTERNAL MEDICINE

## 2019-11-25 PROCEDURE — G8417 CALC BMI ABV UP PARAM F/U: HCPCS | Performed by: INTERNAL MEDICINE

## 2019-11-25 PROCEDURE — 1123F ACP DISCUSS/DSCN MKR DOCD: CPT | Performed by: INTERNAL MEDICINE

## 2019-11-25 PROCEDURE — 4040F PNEUMOC VAC/ADMIN/RCVD: CPT | Performed by: INTERNAL MEDICINE

## 2019-11-25 PROCEDURE — 99214 OFFICE O/P EST MOD 30 MIN: CPT | Performed by: INTERNAL MEDICINE

## 2019-11-25 PROCEDURE — G8427 DOCREV CUR MEDS BY ELIG CLIN: HCPCS | Performed by: INTERNAL MEDICINE

## 2019-11-25 ASSESSMENT — ENCOUNTER SYMPTOMS
EYES NEGATIVE: 1
GASTROINTESTINAL NEGATIVE: 1
SORE THROAT: 0
WHEEZING: 0
SINUS PRESSURE: 1
COUGH: 0
RESPIRATORY NEGATIVE: 1
SHORTNESS OF BREATH: 0
ALLERGIC/IMMUNOLOGIC NEGATIVE: 1

## 2020-02-10 RX ORDER — ATORVASTATIN CALCIUM 40 MG/1
40 TABLET, FILM COATED ORAL DAILY
Qty: 90 TABLET | Refills: 1 | Status: SHIPPED | OUTPATIENT
Start: 2020-02-10 | End: 2020-05-07 | Stop reason: SDUPTHER

## 2020-02-10 RX ORDER — FENOFIBRATE 160 MG/1
160 TABLET ORAL DAILY
Qty: 90 TABLET | Refills: 1 | Status: SHIPPED | OUTPATIENT
Start: 2020-02-10 | End: 2020-05-07 | Stop reason: SDUPTHER

## 2020-02-10 RX ORDER — LISINOPRIL AND HYDROCHLOROTHIAZIDE 20; 12.5 MG/1; MG/1
1 TABLET ORAL DAILY
Qty: 90 TABLET | Refills: 1 | Status: SHIPPED | OUTPATIENT
Start: 2020-02-10 | End: 2020-05-07 | Stop reason: SDUPTHER

## 2020-02-10 RX ORDER — CALCITONIN SALMON 200 [IU]/.09ML
1 SPRAY, METERED NASAL DAILY
Qty: 1 BOTTLE | Refills: 3 | Status: SHIPPED | OUTPATIENT
Start: 2020-02-10 | End: 2020-08-10 | Stop reason: SDUPTHER

## 2020-02-10 NOTE — TELEPHONE ENCOUNTER
Patient called and confirmed his appointment for tomorrow, however, he is requesting his refills be sent in today as he must leave right after his appointment to go out of [place] to attend a .  He would like to make sure he can get his medications filled prior to leaving.

## 2020-02-11 ENCOUNTER — OFFICE VISIT (OUTPATIENT)
Dept: INTERNAL MEDICINE CLINIC | Age: 85
End: 2020-02-11
Payer: MEDICARE

## 2020-02-11 VITALS
TEMPERATURE: 97.6 F | WEIGHT: 233 LBS | OXYGEN SATURATION: 98 % | BODY MASS INDEX: 33.43 KG/M2 | RESPIRATION RATE: 16 BRPM | SYSTOLIC BLOOD PRESSURE: 128 MMHG | DIASTOLIC BLOOD PRESSURE: 76 MMHG | HEART RATE: 84 BPM

## 2020-02-11 PROCEDURE — 1123F ACP DISCUSS/DSCN MKR DOCD: CPT | Performed by: INTERNAL MEDICINE

## 2020-02-11 PROCEDURE — 99213 OFFICE O/P EST LOW 20 MIN: CPT | Performed by: INTERNAL MEDICINE

## 2020-02-11 PROCEDURE — G8427 DOCREV CUR MEDS BY ELIG CLIN: HCPCS | Performed by: INTERNAL MEDICINE

## 2020-02-11 PROCEDURE — 4040F PNEUMOC VAC/ADMIN/RCVD: CPT | Performed by: INTERNAL MEDICINE

## 2020-02-11 PROCEDURE — G8417 CALC BMI ABV UP PARAM F/U: HCPCS | Performed by: INTERNAL MEDICINE

## 2020-02-11 PROCEDURE — G8482 FLU IMMUNIZE ORDER/ADMIN: HCPCS | Performed by: INTERNAL MEDICINE

## 2020-02-11 PROCEDURE — 1036F TOBACCO NON-USER: CPT | Performed by: INTERNAL MEDICINE

## 2020-02-11 ASSESSMENT — ENCOUNTER SYMPTOMS
ALLERGIC/IMMUNOLOGIC NEGATIVE: 1
GASTROINTESTINAL NEGATIVE: 1
EYES NEGATIVE: 1
RESPIRATORY NEGATIVE: 1

## 2020-02-11 NOTE — PROGRESS NOTES
Jesus Manuel   Patient's  is 1935  Seen in office on 2020      SUBJECTIVE:  Servando díaz 82 y. o.year old male presents today   Chief Complaint   Patient presents with    Hypertension    Hyperlipidemia     Pt is here for f/u of HTN HLD    Patient has hypertension. Taking medications No headaches, no chest pain, no palpitations and no dizziness. Patient has hyperlipidemia. Taking medications. No abdominal pain, no nausea or vomiting. No myalgias. Feels well  No complaints. No NVD. Taking medications regularly. No side effects noted. Review of Systems   Constitutional: Negative. HENT: Negative. Eyes: Negative. Respiratory: Negative. Cardiovascular: Negative. Gastrointestinal: Negative. Endocrine: Negative. Genitourinary: Negative. Musculoskeletal: Negative. Allergic/Immunologic: Negative. Neurological: Negative. Hematological: Negative. Psychiatric/Behavioral: Negative. OBJECTIVE: /76   Pulse 84   Temp 97.6 °F (36.4 °C) (Oral)   Resp 16   Wt 233 lb (105.7 kg)   SpO2 98%   BMI 33.43 kg/m²     Wt Readings from Last 3 Encounters:   20 233 lb (105.7 kg)   19 236 lb 9.6 oz (107.3 kg)   19 237 lb 6.4 oz (107.7 kg)      GENERAL: - Alert, oriented, pleasant, in no apparent distress. HEENT: - Conjunctiva pink, no scleral icterus. ENT clear. NECK: -Supple. No jugular venous distention noted. No masses felt,  CARDIOVASCULAR: - Normal S1 and S2   Pacemaker on left side. PULMONARY: - No respiratory distress. No wheezes or rales. ABDOMEN: - Soft and non-tender,no masses  ororganomegaly. EXTREMITIES: - No cyanosis, clubbing, or significant edema. SKIN: Skin is warm and dry. NEUROLOGICAL: - Cranial nerves II through XII are grossly intact. IMPRESSION:    Encounter Diagnoses   Name Primary?     Essential hypertension Yes    Mixed hyperlipidemia     Stage 3 chronic kidney disease (HCC)     Primary osteoarthritis of quittin.3    Smokeless tobacco: Never Used   Substance Use Topics    Alcohol use: No     Alcohol/week: 0.0 standard drinks       LAB REVIEW:  CBC:   Lab Results   Component Value Date    WBC 6.9 10/10/2019    HGB 14.5 10/10/2019    HCT 43.5 10/10/2019     10/10/2019     Lipids:   Lab Results   Component Value Date    CHOL 168 2017    TRIG 221 (H) 2017    HDL 41 10/10/2019    LDLCALC 76 2019    LDLDIRECT 106 (H) 10/10/2019    TRIGLYCFAST 195 (H) 10/10/2019     Renal:   Lab Results   Component Value Date    BUN 26 10/10/2019    CREATININE 1.3 10/10/2019     10/10/2019    K 3.9 10/10/2019    ALT 5 10/10/2019    AST 28 10/10/2019    GLUCOSE 87 10/10/2019     PT/INR:   Lab Results   Component Value Date    INR 0.96 2016     A1C: No results found for: Kaveh Stewart MD, 2020 , 2:07 PM

## 2020-04-16 ENCOUNTER — HOSPITAL ENCOUNTER (OUTPATIENT)
Age: 85
Discharge: HOME | End: 2020-04-16
Payer: MEDICARE

## 2020-04-16 LAB
ALBUMIN SERPL-MCNC: 4.4 GM/DL (ref 3.4–5)
ALP BLD-CCNC: 39 IU/L (ref 40–129)
ALT SERPL-CCNC: 27 U/L (ref 10–40)
ANION GAP SERPL CALCULATED.3IONS-SCNC: 9 MMOL/L (ref 4–16)
AST SERPL-CCNC: 26 IU/L (ref 15–37)
BILIRUB SERPL-MCNC: 0.5 MG/DL (ref 0–1)
BUN BLDV-MCNC: 21 MG/DL (ref 6–23)
CALCIUM SERPL-MCNC: 10.3 MG/DL (ref 8.3–10.6)
CHLORIDE BLD-SCNC: 104 MMOL/L (ref 99–110)
CHOLESTEROL, FASTING: 170 MG/DL
CO2: 30 MMOL/L (ref 21–32)
CREAT SERPL-MCNC: 1.3 MG/DL (ref 0.9–1.3)
GFR AFRICAN AMERICAN: >60 ML/MIN/1.73M2
GFR NON-AFRICAN AMERICAN: 53 ML/MIN/1.73M2
GLUCOSE BLD-MCNC: 91 MG/DL (ref 70–99)
HDLC SERPL-MCNC: 40 MG/DL
LDL CHOLESTEROL DIRECT: 103 MG/DL
POTASSIUM SERPL-SCNC: 4.5 MMOL/L (ref 3.5–5.1)
SODIUM BLD-SCNC: 143 MMOL/L (ref 135–145)
TOTAL PROTEIN: 6.6 GM/DL (ref 6.4–8.2)
TRIGLYCERIDE, FASTING: 222 MG/DL

## 2020-04-16 PROCEDURE — 80053 COMPREHEN METABOLIC PANEL: CPT

## 2020-04-16 PROCEDURE — 36415 COLL VENOUS BLD VENIPUNCTURE: CPT

## 2020-04-16 PROCEDURE — 80061 LIPID PANEL: CPT

## 2020-05-07 ENCOUNTER — VIRTUAL VISIT (OUTPATIENT)
Dept: INTERNAL MEDICINE CLINIC | Age: 85
End: 2020-05-07
Payer: MEDICARE

## 2020-05-07 VITALS — HEIGHT: 70 IN | BODY MASS INDEX: 33.36 KG/M2 | WEIGHT: 233.03 LBS

## 2020-05-07 PROCEDURE — 1123F ACP DISCUSS/DSCN MKR DOCD: CPT | Performed by: INTERNAL MEDICINE

## 2020-05-07 PROCEDURE — G8427 DOCREV CUR MEDS BY ELIG CLIN: HCPCS | Performed by: INTERNAL MEDICINE

## 2020-05-07 PROCEDURE — G0438 PPPS, INITIAL VISIT: HCPCS | Performed by: INTERNAL MEDICINE

## 2020-05-07 PROCEDURE — 99214 OFFICE O/P EST MOD 30 MIN: CPT | Performed by: INTERNAL MEDICINE

## 2020-05-07 PROCEDURE — 4040F PNEUMOC VAC/ADMIN/RCVD: CPT | Performed by: INTERNAL MEDICINE

## 2020-05-07 RX ORDER — ATORVASTATIN CALCIUM 40 MG/1
40 TABLET, FILM COATED ORAL DAILY
Qty: 90 TABLET | Refills: 1 | Status: SHIPPED | OUTPATIENT
Start: 2020-05-07 | End: 2020-11-10 | Stop reason: SDUPTHER

## 2020-05-07 RX ORDER — FENOFIBRATE 160 MG/1
160 TABLET ORAL DAILY
Qty: 90 TABLET | Refills: 1 | Status: SHIPPED | OUTPATIENT
Start: 2020-05-07 | End: 2020-11-10 | Stop reason: SDUPTHER

## 2020-05-07 RX ORDER — LISINOPRIL AND HYDROCHLOROTHIAZIDE 20; 12.5 MG/1; MG/1
1 TABLET ORAL DAILY
Qty: 90 TABLET | Refills: 1 | Status: SHIPPED | OUTPATIENT
Start: 2020-05-07 | End: 2020-11-10 | Stop reason: SDUPTHER

## 2020-05-07 ASSESSMENT — ENCOUNTER SYMPTOMS
ALLERGIC/IMMUNOLOGIC NEGATIVE: 1
EYES NEGATIVE: 1
RESPIRATORY NEGATIVE: 1
GASTROINTESTINAL NEGATIVE: 1

## 2020-05-07 ASSESSMENT — PATIENT HEALTH QUESTIONNAIRE - PHQ9
SUM OF ALL RESPONSES TO PHQ QUESTIONS 1-9: 0
SUM OF ALL RESPONSES TO PHQ QUESTIONS 1-9: 0

## 2020-05-07 ASSESSMENT — LIFESTYLE VARIABLES: HOW OFTEN DO YOU HAVE A DRINK CONTAINING ALCOHOL: 0

## 2020-05-07 NOTE — PROGRESS NOTES
MG CPDR Take 1 capsule by mouth 2 times daily Yes Mira Carrasco MD   Cholecalciferol (VITAMIN D3) 2000 UNITS CAPS Take 2,000 Units by mouth daily. Yes Historical Provider, MD   aspirin 81 MG tablet Take 81 mg by mouth daily. Yes Historical Provider, MD   Calcium Citrate (CITRACAL PO) Take 1 tablet by mouth daily  Yes Historical Provider, MD   Multiple Vitamin (MULTIVITAMIN PO) Take  by mouth daily. Yes Historical Provider, MD   GLUCOSAMINE HCL Take  by mouth daily. Yes Historical Provider, MD       Social History     Tobacco Use    Smoking status: Former Smoker     Packs/day: 3.00     Years: 6.00     Pack years: 18.00     Start date: 8/15/1961     Last attempt to quit: 10/27/1965     Years since quittin.6    Smokeless tobacco: Never Used   Substance Use Topics    Alcohol use: No     Alcohol/week: 0.0 standard drinks    Drug use: No        No Known Allergies,   Past Medical History:   Diagnosis Date    Arthritis     right shoulder and knees    DJD (degenerative joint disease) of knee 6/3/2014    H/O 24 hour EKG monitoring 06    Abnormal-LBBB with second degree mobitz type 2    H/O cardiovascular stress test 10/17/01    Abnormal-apical ischemia in the distribution of distal LAD EF40%    H/O colonoscopy ,     Dr. Adonay Fair H/O echocardiogram 06    Mild pulmonary hypertension,mild aortic root dilation EF 50-55%    History of pacemaker 2006    Medtronic    Hyperlipidemia     Hypertension     Melanoma ([facility] [address] of neck and upper back 5/3/2016    Right side of neck below the right ear Upper back. Seen Dr. Km Hamilton in [facility]. [place] Excised completely per pt.  Mobitz (type) II atrioventricular block 06    Osteopenia     Pacemaker at end of battery life 2016    Primary osteoarthritis of both knees 2/3/2016    DJD of both knees. No surgery.  Seen Ortho at [facility] in the past     Stage 3 chronic kidney disease ([facility] [address] 2018   ,   Past Surgical History:   Procedure Laterality Date    FRACTURE SURGERY      Had acute accident broken sternum & brused heart -Lt leg plate, -HH wrist x 2, ?-Rt shoulder x 2, ?-Lt hip fracture with plates and rods    PACEMAKER PLACEMENT  2006    PACEMAKER PLACEMENT  2016    Generator Changeout for End of Life    SKIN CANCER EXCISION  3/2015   ,   Social History     Tobacco Use    Smoking status: Former Smoker     Packs/day: 3.00     Years: 6.00     Pack years: 18.00     Start date: 8/15/1961     Last attempt to quit: 10/27/1965     Years since quittin.6    Smokeless tobacco: Never Used   Substance Use Topics    Alcohol use: No     Alcohol/week: 0.0 standard drinks    Drug use: No       PHYSICAL EXAMINATION:  [ INSTRUCTIONS:  \"[x]\" Indicates a positive item  \"[]\" Indicates a negative item  -- DELETE ALL ITEMS NOT EXAMINED]  Vital Signs: (As obtained by patient/caregiver or practitioner observation)    Blood pressure-133/57 heart rate-62   respiratory rate-    Temperature-97.2 pulse oximetry-     Constitutional: [x] Appears well-developed and well-nourished [x] No apparent distress      [] Abnormal-   Mental status  [x] Alert and awake  [x] Oriented to person/place/time [x]Able to follow commands      Eyes:  EOM    [x]  Normal  [] Abnormal-  Sclera  [x]  Normal  [] Abnormal -         Discharge [x]  None visible  [] Abnormal -    HENT:   [x] Normocephalic, atraumatic.   [] Abnormal   [x] Mouth/Throat: Mucous membranes are moist.     External Ears [x] Normal  [] Abnormal-     Neck: [x] No visualized mass     Pulmonary/Chest: [x] Respiratory effort normal.  [x] No visualized signs of difficulty breathing or respiratory distress        [] Abnormal-      Musculoskeletal:   [] Normal gait with no signs of ataxia         [] Normal range of motion of neck        [] Abnormal-       Neurological:        [x] No Facial Asymmetry (Cranial nerve 7 motor function) (limited exam to video visit)          [x] No gaze palsy        [] Abnormal-         Skin:        [x] No significant exanthematous lesions or discoloration noted on facial skin         [] Abnormal-            Psychiatric:       [x] Normal Affect [] No Hallucinations        [] Abnormal-     Other pertinent observable physical exam findings-     Patient had labs done on 4/16/2020  CMP is essentially normal.  Alkaline phosphatase 39. Creatinine 1.3  Lipid profile showed triglycerides 222. Cholesterol 170, HDL 40,         ASSESSMENT/PLAN:  1. Essential hypertension  Patient blood pressure is in control. Continue the same patient is on metoprolol and lisinopril with hydrochlorothiazide    2. Mixed hyperlipidemia  Patient has hyperlipidemia. Triglycerides are elevated. Advised patient to increase omega-3 to 1000 mg twice a day. He was taking only once a day. Continue rest of the medication. Patient is on fenofibrate also    3. Stage 3 chronic kidney disease (HCC)  Creatinine is 1.3.    4. Osteopenia, unspecified location  Continue calcium and vitamin D along with calcitonin. 5. Cardiac pacemaker  Patient sees cardiologist for that    6. Primary osteoarthritis of both knees  Arthritis is stable. Patient takes glucosamine    Return to office in 3 months. No follow-ups on file. Alex Novoa is a 82 y.o. male being evaluated by a Virtual Visit (video visit) encounter to address concerns as mentioned above. A caregiver was present when appropriate. Due to this being a TeleHealth encounter (During VPNTQ-84 public health emergency), evaluation of the following organ systems was limited: Vitals/Constitutional/EENT/Resp/CV/GI//MS/Neuro/Skin/Heme-Lymph-Imm.   Pursuant to the emergency declaration under the [address] [place] [address], [address] [place] [address] authority and the [employer] and [employer] Act, this Virtual Visit was conducted with patient's (and/or legal guardian's) consent, to reduce the patient's risk of exposure to COVID-19 and provide necessary medical care. The patient (and/or legal guardian) has also been advised to contact this office for worsening conditions or problems, and seek emergency medical treatment and/or call 911 if deemed necessary. Patient identification was verified at the start of the visit: Yes    Total time spent on this encounter: Not billed by time    Services were provided through a video synchronous discussion virtually to substitute for in-person clinic visit. Patient and provider were located at their individual homes. --Cortney Hendrix MD on 5/7/2020 at 2:12 PM    An electronic signature was used to authenticate this note.

## 2020-05-07 NOTE — PROGRESS NOTES
Medicare Annual Wellness Visit  Name: Price Chávez Date: 2020   MRN: [id number] Sex: Male   Age: 82 y.o. Ethnicity: Non-/Non    : 1935 Race: Jennifer Taylor is here for Medicare AWV    Screenings for behavioral, psychosocial and functional/safety risks, and cognitive dysfunction are all negative except as indicated below. These results, as well as other patient data from the [address] form, are documented in Flowsheets linked to this Encounter. No Known Allergies    Prior to Visit Medications    Medication Sig Taking? Authorizing Provider   metoprolol tartrate (LOPRESSOR) 25 MG tablet Take 1 tablet by mouth 2 times daily Yes Larry Apley, MD   lisinopril-hydroCHLOROthiazide (PRINZIDE;ZESTORETIC) 20-12.5 MG per tablet Take 1 tablet by mouth daily Yes Larry Apley, MD   fenofibrate (TRIGLIDE) 160 MG tablet Take 1 tablet by mouth daily Yes Larry Apley, MD   atorvastatin (LIPITOR) 40 MG tablet Take 1 tablet by mouth daily Yes Larry Apley, MD   calcitonin (MIACALCIN) 200 UNIT/ACT nasal spray 1 spray by Nasal route daily Yes Larry Apley, MD   Omega-3 Fatty Acids (FISH OIL) 1000 MG CPDR Take 1 capsule by mouth 2 times daily Yes Larry Apley, MD   Cholecalciferol (VITAMIN D3) 2000 UNITS CAPS Take 2,000 Units by mouth daily. Yes Historical Provider, MD   aspirin 81 MG tablet Take 81 mg by mouth daily. Yes Historical Provider, MD   Calcium Citrate (CITRACAL PO) Take 1 tablet by mouth daily  Yes Historical Provider, MD   Multiple Vitamin (MULTIVITAMIN PO) Take  by mouth daily. Yes Historical Provider, MD   GLUCOSAMINE HCL Take  by mouth daily.  Yes Historical Provider, MD       Past Medical History:   Diagnosis Date    Arthritis     right shoulder and knees    DJD (degenerative joint disease) of knee 6/3/2014    H/O 24 hour EKG monitoring 06    Abnormal-LBBB with second degree mobitz type 2    H/O cardiovascular stress test 10/17/01    Abnormal-apical  Hepatitis B vaccine  Aged Out    Hib vaccine  Aged Out    Meningococcal (ACWY) vaccine  Aged Out     Recommendations for [employer] Due: see orders and patient instructions/AVS.    Unable to collect 3 vital signs due to being a virtual visit and patient not having equipment to take BP or temperature. Recommended screening schedule for the next 5-10 years is provided to the patient in written form: see Patient Instructions/AVS.    Zafar Morrissey LPN, 3/6/1648, performed the documented evaluation under the direct supervision of the attending physician. Nuha Sahu is a 82 y.o. male being evaluated by a Virtual Visit (video visit) encounter to address concerns as mentioned above. A caregiver was present when appropriate. Due to this being a TeleHealth encounter (During [facility] public health emergency), evaluation of the following organ systems was limited: Vitals/Constitutional/EENT/Resp/CV/GI//MS/Neuro/Skin/Heme-Lymph-Imm. Pursuant to the emergency declaration under the [address] [place] [address], [address] [place] [address] and the [employer] Act, this Virtual Visit was conducted with patient's (and/or legal guardian's) consent, to reduce the patient's risk of exposure to COVID-19 and provide necessary medical care. The patient (and/or legal guardian) has also been advised to contact this office for worsening conditions or problems, and seek emergency medical treatment and/or call 911 if deemed necessary. Patient identification was verified at the start of the visit: Yes    Total time spent for this encounter: 15 minutes    Services were provided through a video synchronous discussion virtually to substitute for in-person clinic visit. Patient and provider were located at their individual homes. --Saravanan Resendez LPN on 0/1/5462 at 2:13 PM    An electronic signature was used to authenticate this note.

## 2020-06-01 ENCOUNTER — PROCEDURE VISIT (OUTPATIENT)
Dept: CARDIOLOGY CLINIC | Age: 85
End: 2020-06-01
Payer: MEDICARE

## 2020-06-01 PROCEDURE — 93294 REM INTERROG EVL PM/LDLS PM: CPT | Performed by: INTERNAL MEDICINE

## 2020-06-01 PROCEDURE — 93296 REM INTERROG EVL PM/IDS: CPT | Performed by: INTERNAL MEDICINE

## 2020-08-10 ENCOUNTER — OFFICE VISIT (OUTPATIENT)
Dept: INTERNAL MEDICINE CLINIC | Age: 85
End: 2020-08-10
Payer: MEDICARE

## 2020-08-10 VITALS
RESPIRATION RATE: 16 BRPM | DIASTOLIC BLOOD PRESSURE: 70 MMHG | SYSTOLIC BLOOD PRESSURE: 110 MMHG | OXYGEN SATURATION: 95 % | HEART RATE: 62 BPM | WEIGHT: 239.4 LBS | TEMPERATURE: 98.7 F | BODY MASS INDEX: 34.35 KG/M2

## 2020-08-10 PROCEDURE — 99214 OFFICE O/P EST MOD 30 MIN: CPT | Performed by: INTERNAL MEDICINE

## 2020-08-10 PROCEDURE — G8427 DOCREV CUR MEDS BY ELIG CLIN: HCPCS | Performed by: INTERNAL MEDICINE

## 2020-08-10 PROCEDURE — G8417 CALC BMI ABV UP PARAM F/U: HCPCS | Performed by: INTERNAL MEDICINE

## 2020-08-10 PROCEDURE — 1123F ACP DISCUSS/DSCN MKR DOCD: CPT | Performed by: INTERNAL MEDICINE

## 2020-08-10 PROCEDURE — 4040F PNEUMOC VAC/ADMIN/RCVD: CPT | Performed by: INTERNAL MEDICINE

## 2020-08-10 PROCEDURE — 1036F TOBACCO NON-USER: CPT | Performed by: INTERNAL MEDICINE

## 2020-08-10 RX ORDER — CALCITONIN SALMON 200 [IU]/.09ML
1 SPRAY, METERED NASAL DAILY
Qty: 1 BOTTLE | Refills: 3 | Status: SHIPPED | OUTPATIENT
Start: 2020-08-10 | End: 2021-02-10

## 2020-08-10 ASSESSMENT — ENCOUNTER SYMPTOMS
GASTROINTESTINAL NEGATIVE: 1
EYES NEGATIVE: 1
ALLERGIC/IMMUNOLOGIC NEGATIVE: 1
RESPIRATORY NEGATIVE: 1

## 2020-08-10 NOTE — PROGRESS NOTES
[facility]  Patient's  is 1935  Seen in office on 8/10/2020      SUBJECTIVE:  Jeanmarie Lino bre 82 y. o.year old male presents today   Chief Complaint   Patient presents with    Hypertension    Hyperlipidemia     Pt is here for f/u of HTN, HLD, CKD  Patient has hypertension. Taking medications No headaches, no chest pain, no palpitations and no dizziness. Patient has hyperlipidemia. Taking medications. No abdominal pain, no nausea or vomiting. No myalgias. Pt is feeling well. No complaints. Taking medications regularly. No side effects noted. Review of Systems   Constitutional: Negative. HENT: Negative. Eyes: Negative. Respiratory: Negative. Cardiovascular: Negative. Gastrointestinal: Negative. Endocrine: Negative. Genitourinary: Negative. Musculoskeletal: Negative. Skin: Negative. Allergic/Immunologic: Negative. Neurological: Negative. Hematological: Negative. Psychiatric/Behavioral: Negative. OBJECTIVE: /70   Pulse 62   Temp 98.7 °F (37.1 °C) (Oral)   Resp 16   Wt 239 lb 6.4 oz (108.6 kg)   SpO2 95%   BMI 34.35 kg/m²     Wt Readings from Last 3 Encounters:   08/10/20 239 lb 6.4 oz (108.6 kg)   20 233 lb 0.4 oz (105.7 kg)   20 233 lb (105.7 kg)      GENERAL: - Alert, oriented, pleasant, in no apparent distress. HEENT: - Conjunctiva pink, no scleral icterus. ENT clear. NECK: -Supple. No jugular venous distention noted. No masses felt,  CARDIOVASCULAR: - Normal S1 and S2    PULMONARY: - No respiratory distress. No wheezes or rales. ABDOMEN: - Soft and non-tender,no masses  ororganomegaly. EXTREMITIES: - No cyanosis, clubbing, or significant edema. SKIN: Skin is warm and dry. NEUROLOGICAL: - Cranial nerves II through XII are grossly intact. IMPRESSION:    Encounter Diagnoses   Name Primary?     Essential hypertension Yes    Mixed hyperlipidemia     Cardiac pacemaker     Osteopenia, unspecified location     Arthritis     Malignant melanoma, unspecified site ([facility]     Stage 3 chronic kidney disease ([facility]     Need for prophylactic vaccination against diphtheria-tetanus-pertussis (DTP)     Need for prophylactic vaccination and inoculation against varicella        ASSESSMENT/PLAN:    1. Essential hypertension  Patient has hypertension that is controlled. Continue metoprolol, lisinopril with hydrochlorothiazide. Follow low-salt diet  - Comprehensive Metabolic Panel; Future    2. Mixed hyperlipidemia  Patient has hyperlipidemia. Lipid profile in April was acceptable. Triglycerides were elevated. Follow low-cholesterol diet and Lipitor, fenofibrate and omega-3  - Comprehensive Metabolic Panel; Future  - Lipid, Fasting; Future    3. Cardiac pacemaker  Patient has cardiac pacemaker and sees cardiologist Dr. Chris Almazan    4. Osteopenia, unspecified location  Patient has osteopenia and is on calcium with vitamin D3. He had Fosamax in the past now he is on Miacalcin nasal spray    5. Arthritis  Patient has arthritis. Stable    6. Malignant melanoma, unspecified site [facility])  Patient has history of malignant melanoma which is excised completely. Patient sees dermatology once a year    7. Stage 3 chronic kidney disease ([facility]  Patient has chronic kidney disease. Creatinine is 1.3 now. - CBC Auto Differential; Future    8. Need for prophylactic vaccination against diphtheria-tetanus-pertussis (DTP)  Advised patient to get a tetanus shot  - Tdap (ADACEL) 5-2-15.5 LF-MCG/0.5 injection; Inject 0.5 mLs into the muscle once for 1 dose  Dispense: 0.5 mL; Refill: 0    9. Need for prophylactic vaccination and inoculation against varicella  Advised patient to get shingles vaccine also. - zoster recombinant adjuvanted vaccine (SHINGRIX) 50 MCG/0.5ML SUSR injection; 50 MCG IM then repeat 2-6 months.   Dispense: 0.5 mL; Refill: 1      Orders Placed This Encounter   Procedures    CBC Auto Differential    Comprehensive Metabolic Panel    Lipid, Fasting       Mediations reviewed with the patient. Continue current medications. Appropriate prescriptions are addressed. After visit summeryprovided. Follow up as directed sooner if needed. Questions answered and patient verbalizes understanding. Call for any problems, questions, or concerns. No Known Allergies  Current Outpatient Medications   Medication Sig Dispense Refill    metoprolol tartrate (LOPRESSOR) 25 MG tablet Take 1 tablet by mouth 2 times daily 180 tablet 1    lisinopril-hydroCHLOROthiazide (PRINZIDE;ZESTORETIC) 20-12.5 MG per tablet Take 1 tablet by mouth daily 90 tablet 1    fenofibrate (TRIGLIDE) 160 MG tablet Take 1 tablet by mouth daily 90 tablet 1    atorvastatin (LIPITOR) 40 MG tablet Take 1 tablet by mouth daily 90 tablet 1    calcitonin (MIACALCIN) 200 UNIT/ACT nasal spray 1 spray by Nasal route daily 1 Bottle 3    Omega-3 Fatty Acids (FISH OIL) 1000 MG CPDR Take 1 capsule by mouth 2 times daily      Cholecalciferol (VITAMIN D3) 2000 UNITS CAPS Take 2,000 Units by mouth daily.  aspirin 81 MG tablet Take 81 mg by mouth daily.  Calcium Citrate (CITRACAL PO) Take 1 tablet by mouth daily       Multiple Vitamin (MULTIVITAMIN PO) Take  by mouth daily.  GLUCOSAMINE HCL Take  by mouth daily. No current facility-administered medications for this visit.       Past Medical History:   Diagnosis Date    Arthritis     right shoulder and knees    DJD (degenerative joint disease) of knee 6/3/2014    H/O 24 hour EKG monitoring 11/28/06    Abnormal-LBBB with second degree mobitz type 2    H/O cardiovascular stress test 10/17/01    Abnormal-apical ischemia in the distribution of distal LAD EF40%    H/O colonoscopy 2004, 7/09    Dr. Bebe Rey H/O echocardiogram 12/12/06    Mild pulmonary hypertension,mild aortic root dilation EF 50-55%    History of pacemaker 12/14/2006    Medtronic    Hyperlipidemia     Hypertension     Melanoma ([facility] of neck and upper back 5/3/2016    Right side of neck below the right ear Upper back. Seen Dr. Heather Machado in [facility]. [place] Excised completely per pt.  Billitz (type) II atrioventricular block 06    Osteopenia     Pacemaker at end of battery life 2016    Primary osteoarthritis of both knees 2/3/2016    DJD of both knees. No surgery.  Seen Ortho at [facility] in the past     Stage 3 chronic kidney disease ([facility] 2018     Past Surgical History:   Procedure Laterality Date    FRACTURE SURGERY      Had acute accident broken sternum & brused heart -Lt leg plate, 366-UB wrist x 2, ?-Rt shoulder x 2, ?-Lt hip fracture with plates and rods    PACEMAKER PLACEMENT  2006    PACEMAKER PLACEMENT  2016    Generator Changeout for End of Life    SKIN CANCER EXCISION  3/2015     Social History     Tobacco Use    Smoking status: Former Smoker     Packs/day: 3.00     Years: 6.00     Pack years: 18.00     Start date: 8/15/1961     Last attempt to quit: 10/27/1965     Years since quittin.8    Smokeless tobacco: Never Used   Substance Use Topics    Alcohol use: No     Alcohol/week: 0.0 standard drinks       LAB REVIEW:  CBC:   Lab Results   Component Value Date    WBC 6.9 10/10/2019    HGB 14.5 10/10/2019    HCT 43.5 10/10/2019     10/10/2019     Lipids:   Lab Results   Component Value Date    CHOL 168 2017    TRIG 221 (H) 2017    HDL 40 (L) 2020    LDLCALC 76 2019    LDLDIRECT 103 (H) 2020    TRIGLYCFAST 222 (H) 2020     Renal:   Lab Results   Component Value Date    BUN 21 2020    CREATININE 1.3 2020     2020    K 4.5 2020    ALT 27 2020    AST 26 2020    GLUCOSE 91 2020     PT/INR:   Lab Results   Component Value Date    INR 0.96 2016     A1C: No results found for: Brie Snider MD, 8/10/2020 , 1:50 PM

## 2020-08-31 ENCOUNTER — VIRTUAL VISIT (OUTPATIENT)
Dept: CARDIOLOGY CLINIC | Age: 85
End: 2020-08-31
Payer: MEDICARE

## 2020-08-31 PROCEDURE — 99442 PR PHYS/QHP TELEPHONE EVALUATION 11-20 MIN: CPT | Performed by: INTERNAL MEDICINE

## 2020-08-31 NOTE — PATIENT INSTRUCTIONS
Continue current medications and follow-up labs. Continue pacer check as per care link schedule.   Follow-up in one year sooner if needed

## 2020-08-31 NOTE — ASSESSMENT & PLAN NOTE
Continue device check as per care link schedule. Last checked 3 months ago reported remaining battery life of 6 years.

## 2020-08-31 NOTE — PROGRESS NOTES
Shellee Litten is a 82 y.o. male evaluated via telephone on 2020. Consent:  He and/or health care decision maker is aware that that he may receive a bill for this telephone service, depending on his insurance coverage, and has provided verbal consent to proceed: Yes  Prior to Visit Medications    Medication Sig Taking? Authorizing Provider   calcitonin (MIACALCIN) 200 UNIT/ACT nasal spray 1 spray by Nasal route daily Yes Vazquez Esposito MD   zoster recombinant adjuvanted vaccine (SHINGRIX) 50 MCG/0.5ML SUSR injection 50 MCG IM then repeat 2-6 months. Yes Vazquez Esposito MD   metoprolol tartrate (LOPRESSOR) 25 MG tablet Take 1 tablet by mouth 2 times daily Yes Vazquez Esposito MD   lisinopril-hydroCHLOROthiazide (PRINZIDE;ZESTORETIC) 20-12.5 MG per tablet Take 1 tablet by mouth daily Yes Vazquez Esposito MD   fenofibrate (TRIGLIDE) 160 MG tablet Take 1 tablet by mouth daily Yes Vazquez Esposito MD   atorvastatin (LIPITOR) 40 MG tablet Take 1 tablet by mouth daily Yes Vazquez Esposito MD   Omega-3 Fatty Acids (FISH OIL) 1000 MG CPDR Take 1 capsule by mouth 2 times daily Yes Vazquez Esposito MD   Cholecalciferol (VITAMIN D3) 2000 UNITS CAPS Take 2,000 Units by mouth daily. Yes Historical Provider, MD   aspirin 81 MG tablet Take 81 mg by mouth daily. Yes Historical Provider, MD   Calcium Citrate (CITRACAL PO) Take 1 tablet by mouth daily  Yes Historical Provider, MD   Multiple Vitamin (MULTIVITAMIN PO) Take  by mouth daily. Yes Historical Provider, MD   GLUCOSAMINE HCL Take  by mouth daily.  Yes Historical Provider, MD     Social History     Tobacco Use    Smoking status: Former Smoker     Packs/day: 3.00     Years: 6.00     Pack years: 18.00     Start date: 8/15/1961     Last attempt to quit: 10/27/1965     Years since quittin.10    Smokeless tobacco: Never Used   Substance Use Topics    Alcohol use: No     Alcohol/week: 0.0 standard drinks    Drug use: No      Social History     Tobacco Use    Smoking status: Former Smoker Packs/day: 3.00     Years: 6.00     Pack years: 18.00     Start date: 8/15/1961     Last attempt to quit: 10/27/1965     Years since quittin.10    Smokeless tobacco: Never Used   Substance Use Topics    Alcohol use: No     Alcohol/week: 0.0 standard drinks     Vital Signs: (As obtained by patient/caregiver or practitioner observation)  Patient-Reported Vitals 2020   Patient-Reported Weight 232   Patient-Reported Height 5 10   Patient-Reported Systolic 661   Patient-Reported Diastolic 65   Patient-Reported Pulse 68        Wt Readings from Last 3 Encounters:   08/10/20 239 lb 6.4 oz (108.6 kg)   20 233 lb 0.4 oz (105.7 kg)   20 233 lb (105.7 kg)     Lab Results   Component Value Date    WBC 6.9 10/10/2019    HGB 14.5 10/10/2019    HCT 43.5 10/10/2019     10/10/2019     Triglyceride, Fasting  222High    <150 MG/DL  Final  2020 10:00 AM  [facility]    Cholesterol, Fasting  170  <200 MG/DL  Final  2020 10:00 AM  [address] [place] [address]     (NOTE)   Cardiovascular risk evaluation guidelines:   Low Risk        <200 mg/dL   Average Risk    200-240 mg/dL   High Risk       >240 mg/dL    HDL  40Low    >40 MG/DL  Final  2020 10:00 AM  [address] [place] [address]     LDL Direct  103High            Lab Results   Component Value Date    BUN 21 2020    CREATININE 1.3 2020     2020    K 4.5 2020     Documentation: I reviewed patient's cardiovascular medications and active cardiovascular diagnosis in his chart. I reviewed the last office visit note and followed up on symptoms patient  had reported on previous visit. Multiple questions related to his medications and cardiovascular problem list addressed. Most recent relevant labs are reviewed and discussed with patient.   Concerns and questions related to lab results are also discussed and patient verbalized understanding and asked relevant questions. I communicated with the patient and/or health care decision maker about hypertension and hyperlipidemia and has a permanent pacemaker. 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 Denies any cardiac symptoms. Last pacer check reported  Pacer analysis is reviewed is consistent with normal dual-chamber MRI safe Medtronic Advisa pacer function with stable leads and appropriate battery status for the age of the device. Remaining average battery life is 6 years. Device is 93% APVS and 6% ASVS.       Details of this discussion including any medical advice provided:     Cardiac pacemaker  Continue device check as per care link schedule. Last checked 3 months ago reported remaining battery life of 6 years. Essential hypertension  Well controled. Continue current medication. Mixed hyperlipidemia  LDL has gone up from 76 last year to 103 this year. Patient is compliant to his medications on Lipitor 40 mg a day and has repeat lipids ordered by PCP. Stage 3 chronic kidney disease (HCC)  Recent creatinine was 1.3. Has a follow-up with PCP. I affirm this is a Patient Initiated Episode with an Established Patient who has not had a related appointment within my department in the past 7 days or scheduled within the next 24 hours.     Total Time: minutes: 11-20 minutes    Note: not billable if this call serves to triage the patient into an appointment for the relevant concern      Cait Garcia

## 2020-08-31 NOTE — ASSESSMENT & PLAN NOTE
LDL has gone up from 76 last year to 103 this year. Patient is compliant to his medications on Lipitor 40 mg a day and has repeat lipids ordered by PCP.

## 2020-09-06 ENCOUNTER — PROCEDURE VISIT (OUTPATIENT)
Dept: CARDIOLOGY CLINIC | Age: 85
End: 2020-09-06
Payer: MEDICARE

## 2020-09-06 PROCEDURE — 93294 REM INTERROG EVL PM/LDLS PM: CPT | Performed by: INTERNAL MEDICINE

## 2020-09-06 PROCEDURE — 93296 REM INTERROG EVL PM/IDS: CPT | Performed by: INTERNAL MEDICINE

## 2020-11-09 ENCOUNTER — HOSPITAL ENCOUNTER (OUTPATIENT)
Age: 85
Discharge: HOME | End: 2020-11-09
Payer: MEDICARE

## 2020-11-09 LAB
ALBUMIN SERPL-MCNC: 4.2 GM/DL (ref 3.4–5)
ALP BLD-CCNC: 39 IU/L (ref 40–129)
ALT SERPL-CCNC: 30 U/L (ref 10–40)
ANION GAP SERPL CALCULATED.3IONS-SCNC: 12 MMOL/L (ref 4–16)
AST SERPL-CCNC: 29 IU/L (ref 15–37)
BASOPHILS ABSOLUTE: 0.1 K/CU MM
BASOPHILS RELATIVE PERCENT: 0.6 % (ref 0–1)
BILIRUB SERPL-MCNC: 0.6 MG/DL (ref 0–1)
BUN BLDV-MCNC: 22 MG/DL (ref 6–23)
CALCIUM SERPL-MCNC: 10.2 MG/DL (ref 8.3–10.6)
CHLORIDE BLD-SCNC: 104 MMOL/L (ref 99–110)
CHOLESTEROL, FASTING: 170 MG/DL
CO2: 24 MMOL/L (ref 21–32)
CREAT SERPL-MCNC: 1.3 MG/DL (ref 0.9–1.3)
DIFFERENTIAL TYPE: ABNORMAL
EOSINOPHILS ABSOLUTE: 0.2 K/CU MM
EOSINOPHILS RELATIVE PERCENT: 2.4 % (ref 0–3)
GFR AFRICAN AMERICAN: >60 ML/MIN/1.73M2
GFR NON-AFRICAN AMERICAN: 53 ML/MIN/1.73M2
GLUCOSE FASTING: 94 MG/DL (ref 70–99)
HCT VFR BLD CALC: 45.4 % (ref 42–52)
HDLC SERPL-MCNC: 42 MG/DL
HEMOGLOBIN: 15.1 GM/DL (ref 13.5–18)
IMMATURE NEUTROPHIL %: 0.5 % (ref 0–0.43)
LDL CHOLESTEROL DIRECT: 104 MG/DL
LYMPHOCYTES ABSOLUTE: 2.8 K/CU MM
LYMPHOCYTES RELATIVE PERCENT: 34.3 % (ref 24–44)
MCH RBC QN AUTO: 32.1 PG (ref 27–31)
MCHC RBC AUTO-ENTMCNC: 33.3 % (ref 32–36)
MCV RBC AUTO: 96.6 FL (ref 78–100)
MONOCYTES ABSOLUTE: 1.1 K/CU MM
MONOCYTES RELATIVE PERCENT: 13 % (ref 0–4)
PDW BLD-RTO: 12.5 % (ref 11.7–14.9)
PLATELET # BLD: 250 K/CU MM (ref 140–440)
PMV BLD AUTO: 10 FL (ref 7.5–11.1)
POTASSIUM SERPL-SCNC: 4.3 MMOL/L (ref 3.5–5.1)
RBC # BLD: 4.7 M/CU MM (ref 4.6–6.2)
SEGMENTED NEUTROPHILS ABSOLUTE COUNT: 4 K/CU MM
SEGMENTED NEUTROPHILS RELATIVE PERCENT: 49.2 % (ref 36–66)
SODIUM BLD-SCNC: 140 MMOL/L (ref 135–145)
TOTAL IMMATURE NEUTOROPHIL: 0.04 K/CU MM
TOTAL PROTEIN: 6.8 GM/DL (ref 6.4–8.2)
TRIGLYCERIDE, FASTING: 175 MG/DL
WBC # BLD: 8.2 K/CU MM (ref 4–10.5)

## 2020-11-09 PROCEDURE — 80061 LIPID PANEL: CPT

## 2020-11-09 PROCEDURE — 36415 COLL VENOUS BLD VENIPUNCTURE: CPT

## 2020-11-09 PROCEDURE — 80053 COMPREHEN METABOLIC PANEL: CPT

## 2020-11-09 PROCEDURE — 85025 COMPLETE CBC W/AUTO DIFF WBC: CPT

## 2020-11-10 ENCOUNTER — OFFICE VISIT (OUTPATIENT)
Dept: INTERNAL MEDICINE CLINIC | Age: 85
End: 2020-11-10
Payer: MEDICARE

## 2020-11-10 VITALS
OXYGEN SATURATION: 96 % | TEMPERATURE: 98.3 F | SYSTOLIC BLOOD PRESSURE: 128 MMHG | BODY MASS INDEX: 34.32 KG/M2 | HEART RATE: 72 BPM | WEIGHT: 239.2 LBS | RESPIRATION RATE: 16 BRPM | DIASTOLIC BLOOD PRESSURE: 72 MMHG

## 2020-11-10 PROCEDURE — G8484 FLU IMMUNIZE NO ADMIN: HCPCS | Performed by: INTERNAL MEDICINE

## 2020-11-10 PROCEDURE — 99214 OFFICE O/P EST MOD 30 MIN: CPT | Performed by: INTERNAL MEDICINE

## 2020-11-10 PROCEDURE — G8427 DOCREV CUR MEDS BY ELIG CLIN: HCPCS | Performed by: INTERNAL MEDICINE

## 2020-11-10 PROCEDURE — 1036F TOBACCO NON-USER: CPT | Performed by: INTERNAL MEDICINE

## 2020-11-10 PROCEDURE — G8417 CALC BMI ABV UP PARAM F/U: HCPCS | Performed by: INTERNAL MEDICINE

## 2020-11-10 PROCEDURE — 1123F ACP DISCUSS/DSCN MKR DOCD: CPT | Performed by: INTERNAL MEDICINE

## 2020-11-10 PROCEDURE — 4040F PNEUMOC VAC/ADMIN/RCVD: CPT | Performed by: INTERNAL MEDICINE

## 2020-11-10 RX ORDER — ATORVASTATIN CALCIUM 40 MG/1
40 TABLET, FILM COATED ORAL DAILY
Qty: 90 TABLET | Refills: 1 | Status: SHIPPED | OUTPATIENT
Start: 2020-11-10 | End: 2021-05-10 | Stop reason: SDUPTHER

## 2020-11-10 RX ORDER — LISINOPRIL AND HYDROCHLOROTHIAZIDE 20; 12.5 MG/1; MG/1
1 TABLET ORAL DAILY
Qty: 90 TABLET | Refills: 1 | Status: SHIPPED | OUTPATIENT
Start: 2020-11-10 | End: 2021-05-10 | Stop reason: SDUPTHER

## 2020-11-10 RX ORDER — FENOFIBRATE 160 MG/1
160 TABLET ORAL DAILY
Qty: 90 TABLET | Refills: 1 | Status: SHIPPED | OUTPATIENT
Start: 2020-11-10 | End: 2021-07-06 | Stop reason: SDUPTHER

## 2020-11-10 ASSESSMENT — ENCOUNTER SYMPTOMS
ALLERGIC/IMMUNOLOGIC NEGATIVE: 1
RESPIRATORY NEGATIVE: 1
EYES NEGATIVE: 1
COUGH: 0
SHORTNESS OF BREATH: 0
GASTROINTESTINAL NEGATIVE: 1
WHEEZING: 0

## 2020-11-10 NOTE — PROGRESS NOTES
Harika Barnes  Patient's  is 1935  Seen in office on 11/10/2020      SUBJECTIVE:  Cena Sacks isa 82 y. o.year old male presents today   Chief Complaint   Patient presents with    Hypertension    Medication Refill     Pt is here for f/u of HTN HLD osteopenia, pacemaker  Pt states he still has pain in the joints. Has low back pain and right shoulder pain. Takes advil or aleve 2 times a week. Patient has hypertension. Taking medications No headaches, no chest pain, no palpitations and no dizziness. Patient has hyperlipidemia. Taking medications. No abdominal pain, no nausea or vomiting. No myalgias. Sees cardiologist for pacemaker. Has been on miacalcin for osteopenia for some time. Taking medications regularly. No side effects noted. All lab results reviewed with patient. Patient states he got flu vaccine and tetanus shot at [address] [place] [address]. Review of Systems   Constitutional: Negative. HENT: Negative. Eyes: Negative. Respiratory: Negative. Negative for cough, shortness of breath and wheezing. Cardiovascular: Negative. Gastrointestinal: Negative. Endocrine: Negative. Genitourinary: Negative. Musculoskeletal: Positive for arthralgias. Skin: Negative. Allergic/Immunologic: Negative. Neurological: Negative. Hematological: Negative. Psychiatric/Behavioral: Negative. OBJECTIVE: /72   Pulse 72   Temp 98.3 °F (36.8 °C) (Oral)   Resp 16   Wt 239 lb 3.2 oz (108.5 kg)   SpO2 96%   BMI 34.32 kg/m²     Wt Readings from Last 3 Encounters:   11/10/20 239 lb 3.2 oz (108.5 kg)   08/10/20 239 lb 6.4 oz (108.6 kg)   20 233 lb 0.4 oz (105.7 kg)       Patient was seen taking COVID-19 precautions. Face mask, gloves and eyes protectors were used. Patient also wore facemask. GENERAL: - Alert, oriented, pleasant, in no apparent distress. HEENT: - Conjunctiva pink, no scleral icterus. ENT clear. NECK: -Supple.   No jugular venous distention noted. No masses felt,  CARDIOVASCULAR: - Normal S1 and S2    PULMONARY: - No respiratory distress. No wheezes or rales. ABDOMEN: - Soft and non-tender,no masses  ororganomegaly. EXTREMITIES: - No cyanosis, clubbing, or significant edema. SKIN: Skin is warm and dry. NEUROLOGICAL: - Cranial nerves II through XII are grossly intact. IMPRESSION:    Encounter Diagnoses   Name Primary?  Essential hypertension Yes    Mixed hyperlipidemia     Stage 3a chronic kidney disease     Cardiac pacemaker     Osteopenia, unspecified location     [facility]     Arthritis        ASSESSMENT/PLAN:    Hypertension in control. Follow low salt diet. Continue current treatment. Hyperlipidemia is stable. Follow low cholesterol diet. Continue current treatment. CKD : Patient is stable. Continue current treatment. Cr 1.3  Pacemaker : sees cardiologist  Osteopenia : pt on miacalcin and calcium and vitamin D : d/c miaclacin. Arthritis : d/c NSAID and take tylenol  Monocytosis : stable. Melanoma by history : sees dermatologist once a year. Return to office in 3 months. Mediations reviewed with the patient. Continue current medications. Appropriate prescriptions are addressed. After visit summeryprovided. Follow up as directed sooner if needed. Questions answered and patient verbalizes understanding. Call for any problems, questions, or concerns.        No Known Allergies  Current Outpatient Medications   Medication Sig Dispense Refill    calcitonin (MIACALCIN) 200 UNIT/ACT nasal spray 1 spray by Nasal route daily 1 Bottle 3    metoprolol tartrate (LOPRESSOR) 25 MG tablet Take 1 tablet by mouth 2 times daily 180 tablet 1    lisinopril-hydroCHLOROthiazide (PRINZIDE;ZESTORETIC) 20-12.5 MG per tablet Take 1 tablet by mouth daily 90 tablet 1    fenofibrate (TRIGLIDE) 160 MG tablet Take 1 tablet by mouth daily 90 tablet 1    atorvastatin (LIPITOR) 40 MG tablet Take 1 tablet by mouth daily 90 tablet 1    Omega-3 Fatty Acids (FISH OIL) 1000 MG CPDR Take 1 capsule by mouth 2 times daily      Cholecalciferol (VITAMIN D3) 2000 UNITS CAPS Take 2,000 Units by mouth daily.  aspirin 81 MG tablet Take 81 mg by mouth daily.  Calcium Citrate (CITRACAL PO) Take 1 tablet by mouth daily       Multiple Vitamin (MULTIVITAMIN PO) Take  by mouth daily.  GLUCOSAMINE HCL Take  by mouth daily.  zoster recombinant adjuvanted vaccine (SHINGRIX) 50 MCG/0.5ML SUSR injection 50 MCG IM then repeat 2-6 months. 0.5 mL 1     No current facility-administered medications for this visit. Past Medical History:   Diagnosis Date    Arthritis     right shoulder and knees    DJD (degenerative joint disease) of knee 6/3/2014    H/O 24 hour EKG monitoring 11/28/06    Abnormal-LBBB with second degree mobitz type 2    H/O cardiovascular stress test 10/17/01    Abnormal-apical ischemia in the distribution of distal LAD EF40%    H/O colonoscopy 2004, 7/09    Dr. Allison Blankenship H/O echocardiogram 12/12/06    Mild pulmonary hypertension,mild aortic root dilation EF 50-55%    History of pacemaker 12/14/2006    Medtronic    Hyperlipidemia     Hypertension     Melanoma ([address] of neck and upper back 5/3/2016    Right side of neck below the right ear Upper back. Seen Dr. Darrel Marrero in [place]. [place] Excised completely per pt.  Billitz (type) II atrioventricular block 11/28/06    Osteopenia     Pacemaker at end of battery life 08/03/2016    Primary osteoarthritis of both knees 2/3/2016    DJD of both knees. No surgery.  Seen Ortho at [place] in the past     Stage 3 chronic kidney disease 8/28/2018     Past Surgical History:   Procedure Laterality Date    FRACTURE SURGERY      Had acute accident broken sternum & brused heart 1953-Lt leg plate, 7019-EK wrist x 2, ?-Rt shoulder x 2, ?-Lt hip fracture with plates and rods    PACEMAKER PLACEMENT  12/14/2006    PACEMAKER PLACEMENT  08/03/2016    Generator

## 2020-12-14 ENCOUNTER — PROCEDURE VISIT (OUTPATIENT)
Dept: CARDIOLOGY CLINIC | Age: 85
End: 2020-12-14
Payer: MEDICARE

## 2020-12-14 PROCEDURE — 93296 REM INTERROG EVL PM/IDS: CPT | Performed by: INTERNAL MEDICINE

## 2020-12-14 PROCEDURE — 93294 REM INTERROG EVL PM/LDLS PM: CPT | Performed by: INTERNAL MEDICINE

## 2020-12-15 ENCOUNTER — TELEPHONE (OUTPATIENT)
Dept: CARDIOLOGY CLINIC | Age: 85
End: 2020-12-15

## 2021-02-10 ENCOUNTER — OFFICE VISIT (OUTPATIENT)
Dept: INTERNAL MEDICINE CLINIC | Age: 86
End: 2021-02-10
Payer: MEDICARE

## 2021-02-10 VITALS
RESPIRATION RATE: 16 BRPM | TEMPERATURE: 98.6 F | DIASTOLIC BLOOD PRESSURE: 82 MMHG | BODY MASS INDEX: 34.58 KG/M2 | WEIGHT: 241 LBS | SYSTOLIC BLOOD PRESSURE: 136 MMHG | HEART RATE: 80 BPM | OXYGEN SATURATION: 98 %

## 2021-02-10 DIAGNOSIS — M85.80 OSTEOPENIA, UNSPECIFIED LOCATION: ICD-10-CM

## 2021-02-10 DIAGNOSIS — C43.9 MALIGNANT MELANOMA, UNSPECIFIED SITE (HCC): ICD-10-CM

## 2021-02-10 DIAGNOSIS — I10 ESSENTIAL HYPERTENSION: ICD-10-CM

## 2021-02-10 DIAGNOSIS — N18.31 STAGE 3A CHRONIC KIDNEY DISEASE (HCC): ICD-10-CM

## 2021-02-10 DIAGNOSIS — E78.2 MIXED HYPERLIPIDEMIA: ICD-10-CM

## 2021-02-10 DIAGNOSIS — Z95.0 CARDIAC PACEMAKER: ICD-10-CM

## 2021-02-10 DIAGNOSIS — D72.821 MONOCYTOSIS: ICD-10-CM

## 2021-02-10 DIAGNOSIS — M19.90 ARTHRITIS: ICD-10-CM

## 2021-02-10 PROCEDURE — 99214 OFFICE O/P EST MOD 30 MIN: CPT | Performed by: INTERNAL MEDICINE

## 2021-02-10 ASSESSMENT — ENCOUNTER SYMPTOMS
GASTROINTESTINAL NEGATIVE: 1
COUGH: 0
WHEEZING: 0
EYES NEGATIVE: 1
SHORTNESS OF BREATH: 0
ALLERGIC/IMMUNOLOGIC NEGATIVE: 1

## 2021-02-10 ASSESSMENT — PATIENT HEALTH QUESTIONNAIRE - PHQ9
SUM OF ALL RESPONSES TO PHQ9 QUESTIONS 1 & 2: 0
2. FEELING DOWN, DEPRESSED OR HOPELESS: 0
SUM OF ALL RESPONSES TO PHQ QUESTIONS 1-9: 0
1. LITTLE INTEREST OR PLEASURE IN DOING THINGS: 0
SUM OF ALL RESPONSES TO PHQ QUESTIONS 1-9: 0

## 2021-02-10 NOTE — ASSESSMENT & PLAN NOTE
-takes Calcium + vitamin D3  2000 units a day.  Had fosamax in the past.  -bone density in 2/16 osteoprosis

## 2021-02-10 NOTE — PROGRESS NOTES
Richard Green  Patient's  is 1935  Seen in office on 2/10/2021      SUBJECTIVE:  Azeb Saxena bre 82 y. o.year old male presents today   Chief Complaint   Patient presents with    Hypertension    Hyperlipidemia     Pt is here for f/u of HTN HLD pacemaker arthritis   Patient has hypertension. Taking medications No headaches, no chest pain, no palpitations and no dizziness. Patient has hyperlipidemia. Taking medications. No abdominal pain, no nausea or vomiting. No myalgias. Pt has pacemaker and sees cardiologist   No palpitations  Pt has pain in the right shoulder chronic   He will see ortho on his own  Pt has monocytosis . Will recheck it. Taking medications regularly. No side effects noted. Review of Systems   Constitutional: Negative. Negative for chills, diaphoresis, fatigue and fever. HENT: Negative. Eyes: Negative. Respiratory: Negative for cough, shortness of breath and wheezing. Cardiovascular: Negative. Negative for chest pain, palpitations and leg swelling. Gastrointestinal: Negative. Endocrine: Negative. Genitourinary: Negative. Musculoskeletal: Negative. Skin: Negative. Allergic/Immunologic: Negative. Neurological: Negative. Hematological: Negative. Psychiatric/Behavioral: Negative. OBJECTIVE: /82   Pulse 80   Temp 98.6 °F (37 °C) (Oral)   Resp 16   Wt 241 lb (109.3 kg)   SpO2 98%   BMI 34.58 kg/m²     Wt Readings from Last 3 Encounters:   02/10/21 241 lb (109.3 kg)   11/10/20 239 lb 3.2 oz (108.5 kg)   08/10/20 239 lb 6.4 oz (108.6 kg)      GENERAL: - Alert, oriented, pleasant, in no apparent distress. HEENT: - Conjunctiva pink, no scleral icterus. ENT clear. NECK: -Supple. No jugular venous distention noted. No masses felt,  CARDIOVASCULAR: - Normal S1 and S2    PULMONARY: - No respiratory distress. No wheezes or rales. ABDOMEN: - Soft and non-tender,no masses  ororganomegaly.   EXTREMITIES: - No cyanosis, clubbing, or significant edema. SKIN: Skin is warm and dry. NEUROLOGICAL: - Cranial nerves II through XII are grossly intact. IMPRESSION:    Encounter Diagnoses   Name Primary?  Essential hypertension     Malignant melanoma, unspecified site ([address]     Mixed hyperlipidemia     Monocytosis     Osteopenia, unspecified location     Stage 3a chronic kidney disease     Arthritis     Cardiac pacemaker        ASSESSMENT/PLAN:    Essential hypertension  Hypertension in control. Follow low salt diet. Continue current treatment. Patient is on metoprolol and lisinopril with hydrochlorothiazide    Melanoma ([address] of neck and upper back  -Right side of neck below the right ear 4/16  -Upper back lesion in 4/16. Both melanoma per pt. Seen Dr. Jorge Guidry in [facility]. [place]  -Excised completely per pt. Sees dermatologist q year.  -pt states his last appt was cancelled. He will call them and make appt     Mixed hyperlipidemia  Hyperlipidemia is stable. Follow low cholesterol diet. Continue current treatment.  -takes lipitor, omega-3 and fenofibrate   and TRi 175    Monocytosis  Pt is stable. Osteopenia  -takes Calcium + vitamin D3  2000 units a day. Had fosamax in the past.  -bone density in 2/16 osteoprosis    Stage 3 chronic kidney disease (HCC)  Cr fluctuates   Last cr 1.3    Arthritis  right shoulder and knees  Pain in the right shoulder pain   Has not seen ortho and he will make appointment with Orthopedic one. Cardiac pacemaker  For Mobitz type II AV block 11/2006. Sees Dr. Sara Matos in  8/3/16    Return to office in 3 months. Orders Placed This Encounter   Procedures    Lipid, Fasting    Comprehensive Metabolic Panel    CBC WITH MANUAL DIFFERENTIAL         Mediations reviewed with the patient. Continue current medications. Appropriate prescriptions are addressed. After visit summeryprovided. Follow up as directed sooner if needed.   Questions answered and patient verbalizes understanding. Call for any problems, questions, or concerns. No Known Allergies  Current Outpatient Medications   Medication Sig Dispense Refill    atorvastatin (LIPITOR) 40 MG tablet Take 1 tablet by mouth daily 90 tablet 1    fenofibrate (TRIGLIDE) 160 MG tablet Take 1 tablet by mouth daily 90 tablet 1    lisinopril-hydroCHLOROthiazide (PRINZIDE;ZESTORETIC) 20-12.5 MG per tablet Take 1 tablet by mouth daily 90 tablet 1    metoprolol tartrate (LOPRESSOR) 25 MG tablet Take 1 tablet by mouth 2 times daily 180 tablet 1    Omega-3 Fatty Acids (FISH OIL) 1000 MG CPDR Take 1 capsule by mouth 2 times daily      Cholecalciferol (VITAMIN D3) 2000 UNITS CAPS Take 2,000 Units by mouth daily.  aspirin 81 MG tablet Take 81 mg by mouth daily.  Calcium Citrate (CITRACAL PO) Take 1 tablet by mouth daily       Multiple Vitamin (MULTIVITAMIN PO) Take  by mouth daily.  GLUCOSAMINE HCL Take  by mouth daily.  zoster recombinant adjuvanted vaccine (SHINGRIX) 50 MCG/0.5ML SUSR injection 50 MCG IM then repeat 2-6 months. 0.5 mL 1     No current facility-administered medications for this visit. Past Medical History:   Diagnosis Date    Arthritis     right shoulder and knees    DJD (degenerative joint disease) of knee 6/3/2014    H/O 24 hour EKG monitoring 11/28/06    Abnormal-LBBB with second degree mobitz type 2    H/O cardiovascular stress test 10/17/01    Abnormal-apical ischemia in the distribution of distal LAD EF40%    H/O colonoscopy 2004, 7/09    Dr. Shamar Duran H/O echocardiogram 12/12/06    Mild pulmonary hypertension,mild aortic root dilation EF 50-55%    History of pacemaker 12/14/2006    Medtronic    Hyperlipidemia     Hypertension     Melanoma ([facility] of neck and upper back 5/3/2016    Right side of neck below the right ear Upper back. Seen Dr. Chica Rivers in [place]. [place] Excised completely per pt.      Mobitz (type) II atrioventricular block 11/28/06    Osteopenia     Pacemaker at end of battery life 2016    Primary osteoarthritis of both knees 2/3/2016    DJD of both knees. No surgery.  Seen Ortho at [facility] in the past     Stage 3 chronic kidney disease 2018     Past Surgical History:   Procedure Laterality Date    FRACTURE SURGERY      Had acute accident broken sternum & brused heart 3-Lt leg plate, 7265-[place] wrist x 2, ?-Rt shoulder x 2, ?-Lt hip fracture with plates and rods    PACEMAKER PLACEMENT  2006    PACEMAKER PLACEMENT  2016    Generator Changeout for End of Life    SKIN CANCER EXCISION  3/2015     Social History     Tobacco Use    Smoking status: Former Smoker     Packs/day: 3.00     Years: 6.00     Pack years: 18.00     Start date: 8/15/1961     Quit date: 10/27/1965     Years since quittin.3    Smokeless tobacco: Never Used   Substance Use Topics    Alcohol use: No     Alcohol/week: 0.0 standard drinks       LAB REVIEW:  CBC:   Lab Results   Component Value Date    WBC 8.2 2020    HGB 15.1 2020    HCT 45.4 2020     2020     Lipids:   Lab Results   Component Value Date    HDL 42 2020    LDLCALC 76 2019    LDLDIRECT 104 (H) 2020    TRIGLYCFAST 175 (H) 2020    CHOLFAST 170 2020     Renal:   Lab Results   Component Value Date    BUN 22 2020    CREATININE 1.3 2020     2020    K 4.3 2020    ALT 30 2020    AST 29 2020    GLUCOSE 91 2020    GLUF 94 2020     PT/INR:   Lab Results   Component Value Date    INR 0.96 2016     A1C: No results found for: Candido Vuong MD, 2/10/2021 , 4:38 PM

## 2021-02-10 NOTE — ASSESSMENT & PLAN NOTE
right shoulder and knees  Pain in the right shoulder pain   Has not seen ortho and he will make appointment with Orthopedic one.

## 2021-02-10 NOTE — ASSESSMENT & PLAN NOTE
Hyperlipidemia is stable. Follow low cholesterol diet.  Continue current treatment.  -takes lipitor, omega-3 and fenofibrate   and TRi 175

## 2021-03-22 ENCOUNTER — PROCEDURE VISIT (OUTPATIENT)
Dept: CARDIOLOGY CLINIC | Age: 86
End: 2021-03-22
Payer: MEDICARE

## 2021-03-22 DIAGNOSIS — I49.5 SINUS NODE DYSFUNCTION (HCC): ICD-10-CM

## 2021-03-22 DIAGNOSIS — I44.0 AV BLOCK, 1ST DEGREE: ICD-10-CM

## 2021-03-22 DIAGNOSIS — Z95.0 CARDIAC PACEMAKER IN SITU: ICD-10-CM

## 2021-03-22 PROCEDURE — 93294 REM INTERROG EVL PM/LDLS PM: CPT | Performed by: INTERNAL MEDICINE

## 2021-03-22 PROCEDURE — 93296 REM INTERROG EVL PM/IDS: CPT | Performed by: INTERNAL MEDICINE

## 2021-04-12 ENCOUNTER — HOSPITAL ENCOUNTER (OUTPATIENT)
Age: 86
Discharge: HOME | End: 2021-04-12
Payer: MEDICARE

## 2021-04-12 LAB
ALBUMIN SERPL-MCNC: 4.4 GM/DL (ref 3.4–5)
ALP BLD-CCNC: 47 IU/L (ref 40–129)
ALT SERPL-CCNC: 28 U/L (ref 10–40)
ANION GAP SERPL CALCULATED.3IONS-SCNC: 8 MMOL/L (ref 4–16)
AST SERPL-CCNC: 29 IU/L (ref 15–37)
BASOPHILS ABSOLUTE: 0.1 K/CU MM
BASOPHILS RELATIVE PERCENT: 0.6 % (ref 0–1)
BILIRUB SERPL-MCNC: 0.5 MG/DL (ref 0–1)
BUN BLDV-MCNC: 23 MG/DL (ref 6–23)
CALCIUM SERPL-MCNC: 10.8 MG/DL (ref 8.3–10.6)
CHLORIDE BLD-SCNC: 101 MMOL/L (ref 99–110)
CHOLESTEROL, FASTING: 172 MG/DL
CO2: 28 MMOL/L (ref 21–32)
CREAT SERPL-MCNC: 1.2 MG/DL (ref 0.9–1.3)
DIFFERENTIAL TYPE: ABNORMAL
EOSINOPHILS ABSOLUTE: 0.3 K/CU MM
EOSINOPHILS RELATIVE PERCENT: 3.2 % (ref 0–3)
GFR AFRICAN AMERICAN: >60 ML/MIN/1.73M2
GFR NON-AFRICAN AMERICAN: 58 ML/MIN/1.73M2
GLUCOSE FASTING: 93 MG/DL (ref 70–99)
HCT VFR BLD CALC: 46.4 % (ref 42–52)
HDLC SERPL-MCNC: 42 MG/DL
HEMOGLOBIN: 15.2 GM/DL (ref 13.5–18)
IMMATURE NEUTROPHIL %: 0.4 % (ref 0–0.43)
LDL CHOLESTEROL DIRECT: 108 MG/DL
LYMPHOCYTES ABSOLUTE: 2.7 K/CU MM
LYMPHOCYTES RELATIVE PERCENT: 34.2 % (ref 24–44)
MCH RBC QN AUTO: 31.7 PG (ref 27–31)
MCHC RBC AUTO-ENTMCNC: 32.8 % (ref 32–36)
MCV RBC AUTO: 96.9 FL (ref 78–100)
MONOCYTES ABSOLUTE: 1 K/CU MM
MONOCYTES RELATIVE PERCENT: 12.7 % (ref 0–4)
PDW BLD-RTO: 12.4 % (ref 11.7–14.9)
PLATELET # BLD: 251 K/CU MM (ref 140–440)
PMV BLD AUTO: 9.7 FL (ref 7.5–11.1)
POTASSIUM SERPL-SCNC: 4.4 MMOL/L (ref 3.5–5.1)
RBC # BLD: 4.79 M/CU MM (ref 4.6–6.2)
SEGMENTED NEUTROPHILS ABSOLUTE COUNT: 3.9 K/CU MM
SEGMENTED NEUTROPHILS RELATIVE PERCENT: 48.9 % (ref 36–66)
SODIUM BLD-SCNC: 137 MMOL/L (ref 135–145)
TOTAL IMMATURE NEUTOROPHIL: 0.03 K/CU MM
TOTAL PROTEIN: 7 GM/DL (ref 6.4–8.2)
TRIGLYCERIDE, FASTING: 184 MG/DL
WBC # BLD: 7.9 K/CU MM (ref 4–10.5)

## 2021-04-12 PROCEDURE — 36415 COLL VENOUS BLD VENIPUNCTURE: CPT

## 2021-04-12 PROCEDURE — 80061 LIPID PANEL: CPT

## 2021-04-12 PROCEDURE — 85025 COMPLETE CBC W/AUTO DIFF WBC: CPT

## 2021-04-12 PROCEDURE — 80053 COMPREHEN METABOLIC PANEL: CPT

## 2021-05-10 ENCOUNTER — OFFICE VISIT (OUTPATIENT)
Dept: INTERNAL MEDICINE CLINIC | Age: 86
End: 2021-05-10
Payer: MEDICARE

## 2021-05-10 VITALS
SYSTOLIC BLOOD PRESSURE: 126 MMHG | BODY MASS INDEX: 34.27 KG/M2 | RESPIRATION RATE: 16 BRPM | HEIGHT: 70 IN | TEMPERATURE: 98.2 F | DIASTOLIC BLOOD PRESSURE: 68 MMHG | OXYGEN SATURATION: 96 % | WEIGHT: 239.4 LBS | HEART RATE: 72 BPM

## 2021-05-10 DIAGNOSIS — D72.821 MONOCYTOSIS: ICD-10-CM

## 2021-05-10 DIAGNOSIS — M81.0 AGE-RELATED OSTEOPOROSIS WITHOUT CURRENT PATHOLOGICAL FRACTURE: ICD-10-CM

## 2021-05-10 DIAGNOSIS — E78.2 MIXED HYPERLIPIDEMIA: ICD-10-CM

## 2021-05-10 DIAGNOSIS — C43.9 MALIGNANT MELANOMA, UNSPECIFIED SITE (HCC): ICD-10-CM

## 2021-05-10 DIAGNOSIS — M17.0 PRIMARY OSTEOARTHRITIS OF BOTH KNEES: ICD-10-CM

## 2021-05-10 DIAGNOSIS — M85.80 OSTEOPENIA, UNSPECIFIED LOCATION: ICD-10-CM

## 2021-05-10 DIAGNOSIS — Z95.0 CARDIAC PACEMAKER: ICD-10-CM

## 2021-05-10 DIAGNOSIS — M19.90 ARTHRITIS: ICD-10-CM

## 2021-05-10 DIAGNOSIS — N18.31 STAGE 3A CHRONIC KIDNEY DISEASE (HCC): ICD-10-CM

## 2021-05-10 DIAGNOSIS — I10 ESSENTIAL HYPERTENSION: ICD-10-CM

## 2021-05-10 PROCEDURE — 99214 OFFICE O/P EST MOD 30 MIN: CPT | Performed by: INTERNAL MEDICINE

## 2021-05-10 RX ORDER — FENOFIBRATE 160 MG/1
160 TABLET ORAL DAILY
Qty: 90 TABLET | Refills: 1 | Status: CANCELLED | OUTPATIENT
Start: 2021-05-10

## 2021-05-10 RX ORDER — LISINOPRIL AND HYDROCHLOROTHIAZIDE 20; 12.5 MG/1; MG/1
1 TABLET ORAL DAILY
Qty: 90 TABLET | Refills: 1 | Status: SHIPPED | OUTPATIENT
Start: 2021-05-10 | End: 2021-11-18 | Stop reason: SDUPTHER

## 2021-05-10 RX ORDER — ATORVASTATIN CALCIUM 40 MG/1
40 TABLET, FILM COATED ORAL DAILY
Qty: 90 TABLET | Refills: 1 | Status: SHIPPED | OUTPATIENT
Start: 2021-05-10 | End: 2021-05-17

## 2021-05-10 ASSESSMENT — PATIENT HEALTH QUESTIONNAIRE - PHQ9
1. LITTLE INTEREST OR PLEASURE IN DOING THINGS: 0
2. FEELING DOWN, DEPRESSED OR HOPELESS: 0
SUM OF ALL RESPONSES TO PHQ QUESTIONS 1-9: 0
SUM OF ALL RESPONSES TO PHQ QUESTIONS 1-9: 0

## 2021-05-10 ASSESSMENT — ENCOUNTER SYMPTOMS
ALLERGIC/IMMUNOLOGIC NEGATIVE: 1
EYES NEGATIVE: 1
GASTROINTESTINAL NEGATIVE: 1
WHEEZING: 0
SHORTNESS OF BREATH: 0
COUGH: 0

## 2021-05-10 NOTE — ASSESSMENT & PLAN NOTE
-Right side of neck below the right ear 4/16  -Upper back lesion in 4/16. Both melanoma per pt. Seen Dr. Crow Walsh in [place]. [place]  -Excised completely per pt. Sees dermatologist q year.  -not seen him recently. Advised pt to see them.  He will call and make his own appt

## 2021-05-10 NOTE — ASSESSMENT & PLAN NOTE
-takes Calcium + vitamin D3  2000 units a day.  Had fosamax in the past.  -bone density in 2/16 osteoporosis  Will recheck

## 2021-05-10 NOTE — ASSESSMENT & PLAN NOTE
DJD of both knees. No surgery. Seen Ortho one at [facility] in the past  Pain has improved and is not bothering him any more.

## 2021-05-10 NOTE — PROGRESS NOTES
scleral icterus. ENT clear. NECK: -Supple. No jugular venous distention noted. No masses felt,  CARDIOVASCULAR: - Normal S1 and S2    PULMONARY: - No respiratory distress. No wheezes or rales. ABDOMEN: - Soft and non-tender,no masses  ororganomegaly. EXTREMITIES: - No cyanosis, clubbing, or significant edema. SKIN: Skin is warm and dry. NEUROLOGICAL: - Cranial nerves II through XII are grossly intact. IMPRESSION:    Encounter Diagnoses   Name Primary?  Monocytosis     Osteopenia, unspecified location     Primary osteoarthritis of both knees     Stage 3a chronic kidney disease     Arthritis     Cardiac pacemaker     Essential hypertension     Malignant melanoma, unspecified site ([facility] [address] [facility]     Mixed hyperlipidemia     Age-related osteoporosis without current pathological fracture        ASSESSMENT/PLAN:    Monocytosis  Pt has monocytosis since 2017  Discussed with patient in detail. Absolute monocytes are normal  Will refer pt to Dr Radha Dickinson    Osteopenia  -takes Calcium + vitamin D3  2000 units a day. Had fosamax in the past.  -bone density in 2/16 osteoporosis  Will recheck     Primary osteoarthritis of both knees  DJD of both knees. No surgery. Seen Ortho one at [facility] in the past  Pain has improved and is not bothering him any more. Stage 3 chronic kidney disease (HCC)  Last cr 1.2    Arthritis  Pain in the right shoulder pain   Has not seen ortho and he will make appointment with Orthopedic one. Cardiac pacemaker  For Mobitz type II AV block 11/2006. Sees Dr. Christi Puckett in  8/3/16    Essential hypertension  Hypertension in control. Follow low salt diet. Continue current treatment. Patient is on metoprolol and lisinopril with hydrochlorothiazide    Melanoma ([facility] [address] of neck and upper back  -Right side of neck below the right ear 4/16  -Upper back lesion in 4/16. Both melanoma per pt. Seen Dr. Jesse Collazo in [facility]. [place]  -Excised completely per pt.  Sees dermatologist q year.  -not seen him recently. Advised pt to see them. He will call and make his own appt    Mixed hyperlipidemia  Hyperlipidemia is stable. Follow low cholesterol diet. Continue current treatment.  -takes lipitor, omega-3 d/c fenofibrate for possible side effects   lipd profile noted     Return to office in 3 months. Mediations reviewed with the patient. Continue current medications. Appropriate prescriptions are addressed. After visit summeryprovided. Follow up as directed sooner if needed. Questions answered and patient verbalizes understanding. Call for any problems, questions, or concerns. No Known Allergies  Current Outpatient Medications   Medication Sig Dispense Refill    atorvastatin (LIPITOR) 40 MG tablet Take 1 tablet by mouth daily 90 tablet 1    fenofibrate (TRIGLIDE) 160 MG tablet Take 1 tablet by mouth daily 90 tablet 1    lisinopril-hydroCHLOROthiazide (PRINZIDE;ZESTORETIC) 20-12.5 MG per tablet Take 1 tablet by mouth daily 90 tablet 1    metoprolol tartrate (LOPRESSOR) 25 MG tablet Take 1 tablet by mouth 2 times daily 180 tablet 1    Omega-3 Fatty Acids (FISH OIL) 1000 MG CPDR Take 1 capsule by mouth 2 times daily      Cholecalciferol (VITAMIN D3) 2000 UNITS CAPS Take 2,000 Units by mouth daily.  aspirin 81 MG tablet Take 81 mg by mouth daily.  Calcium Citrate (CITRACAL PO) Take 1 tablet by mouth daily       Multiple Vitamin (MULTIVITAMIN PO) Take  by mouth daily.  zoster recombinant adjuvanted vaccine (SHINGRIX) 50 MCG/0.5ML SUSR injection 50 MCG IM then repeat 2-6 months. 0.5 mL 1    GLUCOSAMINE HCL Take  by mouth daily. No current facility-administered medications for this visit.       Past Medical History:   Diagnosis Date    Arthritis     right shoulder and knees    DJD (degenerative joint disease) of knee 6/3/2014    H/O 24 hour EKG monitoring 11/28/06    Abnormal-LBBB with second degree mobitz type 2    H/O cardiovascular stress test 10/17/01    Abnormal-apical ischemia in the distribution of distal LAD EF40%    H/O colonoscopy ,     Dr. Dylan Perez H/O echocardiogram 06    Mild pulmonary hypertension,mild aortic root dilation EF 50-55%    History of pacemaker 2006    Medtronic    Hyperlipidemia     Hypertension     Melanoma ([address] of neck and upper back 5/3/2016    Right side of neck below the right ear Upper back. Seen Dr. Elzbieta Shah in [place]. [place] Excised completely per pt.  Billitz (type) II atrioventricular block 06    Osteopenia     Pacemaker at end of battery life 2016    Primary osteoarthritis of both knees 2/3/2016    DJD of both knees. No surgery.  Seen Ortho at [place] in the past     Stage 3 chronic kidney disease 2018     Past Surgical History:   Procedure Laterality Date    FRACTURE SURGERY      Had acute accident broken sternum & brused heart -Lt leg plate, 3571-RI wrist x 2, ?-Rt shoulder x 2, ?-Lt hip fracture with plates and rods    PACEMAKER PLACEMENT  2006    PACEMAKER PLACEMENT  2016    Generator Changeout for End of Life    SKIN CANCER EXCISION  3/2015     Social History     Tobacco Use    Smoking status: Former Smoker     Packs/day: 3.00     Years: 6.00     Pack years: 18.00     Start date: 8/15/1961     Quit date: 10/27/1965     Years since quittin.5    Smokeless tobacco: Never Used   Substance Use Topics    Alcohol use: No     Alcohol/week: 0.0 standard drinks       LAB REVIEW:  CBC:   Lab Results   Component Value Date    WBC 7.9 2021    HGB 15.2 2021    HCT 46.4 2021     2021     Lipids:   Lab Results   Component Value Date    HDL 42 2021    LDLCALC 76 2019    LDLDIRECT 108 (H) 2021    TRIGLYCFAST 184 (H) 2021    CHOLFAST 172 2021     Renal:   Lab Results   Component Value Date    BUN 23 2021    CREATININE 1.2 2021     2021    K 4.4 2021    ALT 28

## 2021-05-10 NOTE — ASSESSMENT & PLAN NOTE
Pain in the right shoulder pain   Has not seen ortho and he will make appointment with Orthopedic one.

## 2021-05-11 ASSESSMENT — LIFESTYLE VARIABLES
HOW OFTEN DO YOU HAVE A DRINK CONTAINING ALCOHOL: NEVER
AUDIT TOTAL SCORE: INCOMPLETE
AUDIT-C TOTAL SCORE: INCOMPLETE

## 2021-05-11 ASSESSMENT — PATIENT HEALTH QUESTIONNAIRE - PHQ9
1. LITTLE INTEREST OR PLEASURE IN DOING THINGS: 0
SUM OF ALL RESPONSES TO PHQ QUESTIONS 1-9: 0
SUM OF ALL RESPONSES TO PHQ QUESTIONS 1-9: 0

## 2021-05-12 ENCOUNTER — HOSPITAL ENCOUNTER (OUTPATIENT)
Dept: WOMENS IMAGING | Age: 86
Discharge: HOME | End: 2021-05-12
Payer: MEDICARE

## 2021-05-12 ENCOUNTER — TELEPHONE (OUTPATIENT)
Dept: INTERNAL MEDICINE CLINIC | Age: 86
End: 2021-05-12

## 2021-05-12 DIAGNOSIS — M81.0 AGE-RELATED OSTEOPOROSIS WITHOUT CURRENT PATHOLOGICAL FRACTURE: ICD-10-CM

## 2021-05-12 DIAGNOSIS — M85.80 OSTEOPENIA, UNSPECIFIED LOCATION: Primary | ICD-10-CM

## 2021-05-12 DIAGNOSIS — M85.80 OSTEOPENIA, UNSPECIFIED LOCATION: ICD-10-CM

## 2021-05-12 PROCEDURE — 77080 DXA BONE DENSITY AXIAL: CPT

## 2021-05-14 ENCOUNTER — VIRTUAL VISIT (OUTPATIENT)
Dept: INTERNAL MEDICINE CLINIC | Age: 86
End: 2021-05-14
Payer: MEDICARE

## 2021-05-14 DIAGNOSIS — Z00.00 ROUTINE GENERAL MEDICAL EXAMINATION AT A HEALTH CARE FACILITY: Primary | ICD-10-CM

## 2021-05-14 PROCEDURE — G0439 PPPS, SUBSEQ VISIT: HCPCS | Performed by: INTERNAL MEDICINE

## 2021-05-14 ASSESSMENT — PATIENT HEALTH QUESTIONNAIRE - PHQ9
SUM OF ALL RESPONSES TO PHQ QUESTIONS 1-9: 0
2. FEELING DOWN, DEPRESSED OR HOPELESS: 0
SUM OF ALL RESPONSES TO PHQ9 QUESTIONS 1 & 2: 0

## 2021-05-14 NOTE — PROGRESS NOTES
Medicare Annual Wellness Visit  Name: Arlen Liriano Date: 2021   MRN: [id number] Sex: Male   Age: 82 y.o. Ethnicity: Non-/Non    : 1935 Race: Devon Bauman is here for Medicare AWV    Screenings for behavioral, psychosocial and functional/safety risks, and cognitive dysfunction are all negative except as indicated below. These results, as well as other patient data from the [address] form, are documented in Flowsheets linked to this Encounter. No Known Allergies      Prior to Visit Medications    Medication Sig Taking? Authorizing Provider   lisinopril-hydroCHLOROthiazide (PRINZIDE;ZESTORETIC) 20-12.5 MG per tablet Take 1 tablet by mouth daily Yes Wolfgang Harris MD   metoprolol tartrate (LOPRESSOR) 25 MG tablet Take 1 tablet by mouth 2 times daily Yes Wolfgang Harris MD   fenofibrate (TRIGLIDE) 160 MG tablet Take 1 tablet by mouth daily Yes Wolfgang Harris MD   Omega-3 Fatty Acids (FISH OIL) 1000 MG CPDR Take 1 capsule by mouth 2 times daily Yes Wolfgang Harris MD   Cholecalciferol (VITAMIN D3) 2000 UNITS CAPS Take 2,000 Units by mouth daily. Yes Historical Provider, MD   aspirin 81 MG tablet Take 81 mg by mouth daily. Yes Historical Provider, MD   Calcium Citrate (CITRACAL PO) Take 1 tablet by mouth daily  Yes Historical Provider, MD   Multiple Vitamin (MULTIVITAMIN PO) Take  by mouth daily. Yes Historical Provider, MD   GLUCOSAMINE HCL Take  by mouth daily. Yes Historical Provider, MD   atorvastatin (LIPITOR) 40 MG tablet Take 1 tablet by mouth daily  Wolfgang Harris MD   zoster recombinant adjuvanted vaccine (SHINGRIX) 50 MCG/0.5ML SUSR injection 50 MCG IM then repeat 2-6 months.   Patient not taking: Reported on 2021  Wolfgang Harris MD         Past Medical History:   Diagnosis Date    Arthritis     right shoulder and knees    DJD (degenerative joint disease) of knee 6/3/2014    H/O 24 hour EKG monitoring 06    Abnormal-LBBB with second degree brenda type 2    H/O cardiovascular stress test 10/17/01    Abnormal-apical ischemia in the distribution of distal LAD EF40%    H/O colonoscopy 2004, 7/09    Dr. Giovanna Nevarez H/O echocardiogram 12/12/06    Mild pulmonary hypertension,mild aortic root dilation EF 50-55%    History of pacemaker 12/14/2006    Medtronic    Hyperlipidemia     Hypertension     Melanoma ([address] of neck and upper back 5/3/2016    Right side of neck below the right ear Upper back. Seen Dr. Timur White in [place]. [place] Excised completely per pt.  Brenda (type) II atrioventricular block 11/28/06    Osteopenia     Pacemaker at end of battery life 08/03/2016    Primary osteoarthritis of both knees 2/3/2016    DJD of both knees. No surgery. Seen Ortho at [place] in the past     Stage 3 chronic kidney disease 8/28/2018       Past Surgical History:   Procedure Laterality Date    FRACTURE SURGERY      Had acute accident broken sternum & brused heart 1953-Lt leg plate, 4596-UA wrist x 2, ?-Rt shoulder x 2, ?-Lt hip fracture with plates and rods    PACEMAKER PLACEMENT  12/14/2006    PACEMAKER PLACEMENT  08/03/2016    Generator Changeout for End of Life    SKIN CANCER EXCISION  3/2015         Family History   Problem Relation Age of Onset    High Blood Pressure Mother     Emphysema Father     Cancer Sister         Bladder cancer       CareTeam (Including outside providers/suppliers regularly involved in providing care):   Patient Care Team:  Maryuri Brown MD as PCP - Nesha Farr MD as PCP - [facility] Empaneled Provider  Mario Menard MD (Orthopedic Surgery)    Wt Readings from Last 3 Encounters:   05/10/21 239 lb 6.4 oz (108.6 kg)   02/10/21 241 lb (109.3 kg)   11/10/20 239 lb 3.2 oz (108.5 kg)   Patient reported weight 232lb, height 5' 10\"  There were no vitals filed for this visit. There is no height or weight on file to calculate BMI.     Based upon direct observation of the patient, evaluation of cognition reveals recent and remote memory intact. Patient's complete Health Risk Assessment and screening values have been reviewed and are found in Flowsheets. The following problems were reviewed today and where indicated follow up appointments were made and/or referrals ordered. Positive Risk Factor Screenings with Interventions:            General Health and ACP:  General  In general, how would you say your health is?: Very Good  In the past 7 days, have you experienced any of the following?  New or Increased Pain, New or Increased Fatigue, Loneliness, Social Isolation, Stress or Anger?: None of These  Do you get the social and emotional support that you need?: Yes  Do you have a Living Will?: Yes  Advance Directives     Power of [facility] Will ACP-Advance Directive ACP-Power of     Not on File Not on File Not on File Not on File      General Health Risk Interventions:  · No Living Will: ACP documents already completed- patient asked to provide copy to the office    Health Habits/Nutrition:  Health Habits/Nutrition  Do you exercise for at least 20 minutes 2-3 times per week?: Yes  Have you lost any weight without trying in the past 3 months?: No  Do you eat only one meal per day?: No  Have you seen the dentist within the past year?: Yes         Hearing/Vision:  No exam data present  Hearing/Vision  Do you or your family notice any trouble with your hearing that hasn't been managed with hearing aids?: No  Do you have difficulty driving, watching TV, or doing any of your daily activities because of your eyesight?: No  Have you had an eye exam within the past year?: (!) No  Hearing/Vision Interventions:  · Vision concerns:  patient encouraged to make appointment with his/her eye specialist      Personalized Preventive Plan   Current Health Maintenance Status  Immunization History   Administered Date(s) Administered    COVID-19, Moderna, PF, 100mcg/0.5mL 01/15/2021, 02/12/2021    Influenza Vaccine, unspecified formulation 02/02/2016    Influenza Virus Vaccine 10/15/2014, 09/11/2020    Influenza, High Dose (Fluzone 65 yrs and older) 10/01/2016, 09/22/2017, 01/06/2020    Influenza, High-dose, Quadv, 65 yrs +, IM (Fluzone) 09/11/2020    Pneumococcal Conjugate 13-valent ([id number]) 10/28/2015    Pneumococcal Polysaccharide ([id number]) 01/01/2005, 01/01/2009    Tdap (Boostrix, Adacel) 09/11/2020    Zoster Live (Zostavax) 08/12/2016        Health Maintenance   Topic Date Due    Shingles Vaccine (2 of 3) 10/07/2016    Annual Wellness Visit (AWV)  05/08/2021    Lipid screen  04/12/2022    Potassium monitoring  04/12/2022    Creatinine monitoring  04/12/2022    DTaP/Tdap/Td vaccine (2 - Td) 09/11/2030    Flu vaccine  Completed    Pneumococcal 65+ years Vaccine  Completed    COVID-19 Vaccine  Completed    Hepatitis A vaccine  Aged Out    Hepatitis B vaccine  Aged Out    Hib vaccine  Aged Out    Meningococcal (ACWY) vaccine  Aged Out     Recommendations for [employer] Due: see orders and patient instructions/AVS. Discussed need for shingles vaccination. Unable to obtain 3 vital signs due to patient not having equipment to take blood pressure/temperature. Recommended screening schedule for the next 5-10 years is provided to the patient in written form: see Patient Instructions/AVS.    Fransisco THOMPSON LPN, 3/74/[birth year]64, performed the documented evaluation under the direct supervision of the attending physician. Mariel Leiva, was evaluated through a synchronous (real-time) audio-video encounter. The patient (or guardian if applicable) is aware that this is a billable service. Verbal consent to proceed has been obtained within the past 12 months. The visit was conducted pursuant to the emergency declaration under the [facility], [address] [place] [address] authority and the [employer] General Act.   Patient identification was verified, and a caregiver was present when appropriate. The patient was located in the state of [place], where the provider was credentialed to provide care. Total time spent for this encounter: Not billed by time    --Dong Katz LPN on 6/48/5933 at 5:06 AM    An electronic signature was used to authenticate this note.

## 2021-05-17 ENCOUNTER — INITIAL CONSULT (OUTPATIENT)
Dept: ONCOLOGY | Age: 86
End: 2021-05-17
Payer: MEDICARE

## 2021-05-17 ENCOUNTER — HOSPITAL ENCOUNTER (OUTPATIENT)
Dept: INFUSION THERAPY | Age: 86
Discharge: HOME | End: 2021-05-17
Payer: MEDICARE

## 2021-05-17 VITALS
OXYGEN SATURATION: 97 % | TEMPERATURE: 95.8 F | SYSTOLIC BLOOD PRESSURE: 144 MMHG | DIASTOLIC BLOOD PRESSURE: 69 MMHG | HEIGHT: 70 IN | WEIGHT: 238 LBS | HEART RATE: 78 BPM | BODY MASS INDEX: 34.07 KG/M2

## 2021-05-17 DIAGNOSIS — D72.821 MONOCYTOSIS: Primary | ICD-10-CM

## 2021-05-17 PROCEDURE — 99204 OFFICE O/P NEW MOD 45 MIN: CPT | Performed by: INTERNAL MEDICINE

## 2021-05-17 PROCEDURE — 99202 OFFICE O/P NEW SF 15 MIN: CPT

## 2021-05-17 ASSESSMENT — PATIENT HEALTH QUESTIONNAIRE - PHQ9
SUM OF ALL RESPONSES TO PHQ QUESTIONS 1-9: 0
1. LITTLE INTEREST OR PLEASURE IN DOING THINGS: 0
SUM OF ALL RESPONSES TO PHQ9 QUESTIONS 1 & 2: 0
SUM OF ALL RESPONSES TO PHQ QUESTIONS 1-9: 0

## 2021-05-17 NOTE — PROGRESS NOTES
Patient Name:  Abelardo Moore  Patient :  1935  Patient MRN:  [id number]     Primary Oncologist: Vale Jarquin MD  Referring Provider: Chas Up MD     Date of Service: 2021      Reason for Consult: To evaluate the patient with relative monocytosis. Chief Complaint:    Chief Complaint   Patient presents with    New Patient     Patient Active Problem List:     Mixed hyperlipidemia     Cardiac pacemaker     Essential hypertension     Primary osteoarthritis of both knees     Melanoma ([facility] [address] of neck and upper back     Stage 3 chronic kidney disease ([address]     Monocytosis     Age-related osteoporosis without current pathological fracture    HPI:   Abelardo Moore is a 82-year-old very pleasant gentleman with medical history significant for hypertension, hyperlipidemia, Mobitz type II AV block, status post pacemaker placement in , chronic kidney disease, history of melanoma and osteoarthritis, referred to me on May 17, 2021 for evaluation of his relative monocytosis. He stated that he has been having relative monocytosis since 2017 and it has been quite stable over the course. His absolute monocyte count has always been normal. He does not have anemia, neutropenia or thrombocytopenia. Dr. Chas Up has been monitoring it closely. Because of persistent relative monocytosis, he was subsequently referred to me for further evaluation. He does not have any significant symptoms at today visit. Past Medical History:     Significant for  1. Hypertension  2. Hyperlipidemia  3. Morbitz type II AV block status post dual-chamber pacemaker placement on 2016  4. Chronic kidney disease  5. Melanoma of neck and upper back status post excision  6. Osteoarthritis    Past Surgery History:    Significant for  1. Permanent pacemaker placement  2. Skin cancer excision  3. Left leg fracture surgery in   4. Left wrist surgery in   5.   Left hip fracture surgery    Social dysphagia. Urinary:  No dysuria, No hematuria, No urinary incontinence. Musculoskeletal:  No muscle pain, No swollen joints, No joint redness, No bone pain, No spine tenderness. Skin:  No rash, No nodules, No pruritus, No lesions. Neurologic:  No confusion, No seizures, No syncope, No tremor, No speech change, No headache, No hiccups, No abnormal gait, No sensory changes, No weakness. Psychiatric:  No depression, No anxiety, Concentration normal.  Endocrine:  No polyuria, No polydipsia, No hot flashes, No thyroid symptoms. Hematologic:  No epistaxis, No gingival bleeding, No petechiae, No ecchymosis. Lymphatic:  No lymphadenopathy, No lymphedema. Allergy / Immunologic:  No eczema, No frequent mucous infections, No frequent respiratory infections, No recurrent urticarial, No frequent skin infections. Vital Signs: BP (!) 144/69 (Site: Right Upper Arm, Position: Sitting, Cuff Size: Medium Adult)   Pulse 78   Temp 95.8 °F (35.4 °C) (Infrared)   Ht 5' 10\" (1.778 m)   Wt 238 lb (108 kg)   SpO2 97%   BMI 34.15 kg/m²      Physical Exam:  CONSTITUTIONAL: awake, alert, cooperative, no apparent distress   EYES: pupils equal, round and reactive to light, sclera clear, normal conjunctiva  ENT: Normocephalic, without obvious abnormality, atraumatic  NECK: supple, symmetrical, no jugular venous distension, no carotid bruits   HEMATOLOGIC/LYMPHATIC: no cervical, supraclavicular or axillary lymphadenopathy   LUNGS: VBS, no wheezes, no crackles, no rhonchi, no increased work of breathing, clear to auscultation   CARDIOVASCULAR: regular rate and rhythm, normal S1 and S2, no murmur noted  ABDOMEN: normal bowel sounds x 4, soft, non-distended, non-tender, no masses palpated, no hepatosplenomegaly   MUSCULOSKELETAL: full range of motion noted, tone is normal  NEUROLOGIC: awake, alert, oriented to name, place and time. Motor skills grossly intact.    SKIN: appears intact, normal skin color, normal texture, normal turgor, no jaundice.    EXTREMITIES: no LE edema, no leg swelling, no cyanosis, no clubbing      Labs:  Hematology:  Lab Results   Component Value Date    WBC 7.9 04/12/2021    RBC 4.79 04/12/2021    HGB 15.2 04/12/2021    HCT 46.4 04/12/2021    MCV 96.9 04/12/2021    MCH 31.7 (H) 04/12/2021    MCHC 32.8 04/12/2021    RDW 12.4 04/12/2021     04/12/2021    MPV 9.7 04/12/2021    SEGSPCT 48.9 04/12/2021    EOSRELPCT 3.2 (H) 04/12/2021    BASOPCT 0.6 04/12/2021    LYMPHOPCT 34.2 04/12/2021    MONOPCT 12.7 (H) 04/12/2021    SEGSABS 3.9 04/12/2021    EOSABS 0.3 04/12/2021    BASOSABS 0.1 04/12/2021    LYMPHSABS 2.7 04/12/2021    MONOSABS 1.0 04/12/2021    DIFFTYPE AUTOMATED DIFFERENTIAL 04/12/2021     No results found for: ESR  Chemistry:  Lab Results   Component Value Date     04/12/2021    K 4.4 04/12/2021     04/12/2021    CO2 28 04/12/2021    BUN 23 04/12/2021    CREATININE 1.2 04/12/2021    GLUCOSE 91 04/16/2020    CALCIUM 10.8 (H) 04/12/2021    PROT 7.0 04/12/2021    LABALBU 4.4 04/12/2021    BILITOT 0.5 04/12/2021    ALKPHOS 47 04/12/2021    AST 29 04/12/2021    ALT 28 04/12/2021    LABGLOM 58 (L) 04/12/2021    GFRAA >60 04/12/2021    AGRATIO 2.0 02/12/2019    GLOB 2.1 02/12/2019     No results found for: MMA, LDH, HOMOCYSTEINE  No components found for: LD  Lab Results   Component Value Date    TSHHS 2.713 01/22/2011    T4FREE 1.06 01/22/2011    FT3 3.0 01/22/2011     Immunology:  Lab Results   Component Value Date    PROT 7.0 04/12/2021     No results found for: Elvan Base, KLFLCR  No results found for: B2M  Coagulation Panel:  Lab Results   Component Value Date    PROTIME 11.2 08/01/2016    INR 0.96 08/01/2016     Anemia Panel:  No results found for: Joanna Shove, FOLATE  Tumor Markers:  Lab Results   Component Value Date    PSA 0.84 01/24/2012        Observations:  PHQ-9 Total Score: 0 (5/17/2021  3:15 PM)       Assessment   Relative monocytosis    Plan: Al Howard is a 82-year-old very pleasant gentleman who has been having relative monocytosis since July 2017 and it has been quite stable over the course. His absolute monocyte count has always been normal. He does not have anemia, neutropenia or thrombocytopenia. Dr. Roberto Lopes has been monitoring it closely. I agree with Dr. You Bedolla and I recommend to continue with close observation. Since his absolute monocytosis is within normal range, I believe it is due to reactive process. No additional investigation needed at this moment. I will plan to follow him periodically. I will consider to do additional work ups if he develops anemia, neutropenia or thrombocytopenia. I answered all his questions and concerns for today. I asked him to follow up with primary care physician on regular basis. I will continue to keep you updated on his progress. Thank you for allowing me to participate in the care of this very pleasant patient. Recent imaging and labs were reviewed and discussed with the patient.

## 2021-05-17 NOTE — PROGRESS NOTES
MA Rooming Questions  Patient: Alban Severs  MRN: [id number]    Date: 5/17/2021        NP    5. Did the patient have a depression screening completed today?  Yes    PHQ-9 Total Score: 0 (5/17/2021  3:15 PM)       PHQ-9 Given to (if applicable):               PHQ-9 Score (if applicable):                     [] Positive     [x]  Negative              Does question #9 need addressed (if applicable)                     [] Yes    []  No               Kavita Vanegas, CMA

## 2021-05-24 ENCOUNTER — TELEPHONE (OUTPATIENT)
Dept: INTERNAL MEDICINE CLINIC | Age: 86
End: 2021-05-24

## 2021-05-24 NOTE — TELEPHONE ENCOUNTER
Patient stated he missed a phone call from our office - I could not find any documentation of phone call to patient .  Patient would like phone call back if need be

## 2021-06-28 ENCOUNTER — PROCEDURE VISIT (OUTPATIENT)
Dept: CARDIOLOGY CLINIC | Age: 86
End: 2021-06-28
Payer: MEDICARE

## 2021-06-28 DIAGNOSIS — Z95.0 CARDIAC PACEMAKER IN SITU: ICD-10-CM

## 2021-06-28 DIAGNOSIS — I49.5 SINUS NODE DYSFUNCTION (HCC): ICD-10-CM

## 2021-06-28 DIAGNOSIS — I44.0 AV BLOCK, 1ST DEGREE: ICD-10-CM

## 2021-06-29 PROCEDURE — 93294 REM INTERROG EVL PM/LDLS PM: CPT | Performed by: INTERNAL MEDICINE

## 2021-06-29 PROCEDURE — 93296 REM INTERROG EVL PM/IDS: CPT | Performed by: INTERNAL MEDICINE

## 2021-07-02 ENCOUNTER — TELEPHONE (OUTPATIENT)
Dept: CARDIOLOGY CLINIC | Age: 86
End: 2021-07-02

## 2021-07-06 ENCOUNTER — HOSPITAL ENCOUNTER (OUTPATIENT)
Age: 86
Discharge: HOME | End: 2021-07-06
Payer: MEDICARE

## 2021-07-06 ENCOUNTER — CLINICAL DOCUMENTATION (OUTPATIENT)
Dept: CARDIOLOGY CLINIC | Age: 86
End: 2021-07-06

## 2021-07-06 ENCOUNTER — OFFICE VISIT (OUTPATIENT)
Dept: INTERNAL MEDICINE CLINIC | Age: 86
End: 2021-07-06
Payer: MEDICARE

## 2021-07-06 VITALS
OXYGEN SATURATION: 96 % | SYSTOLIC BLOOD PRESSURE: 130 MMHG | TEMPERATURE: 99.2 F | DIASTOLIC BLOOD PRESSURE: 68 MMHG | HEART RATE: 68 BPM | BODY MASS INDEX: 34.44 KG/M2 | WEIGHT: 240.6 LBS | HEIGHT: 70 IN | RESPIRATION RATE: 16 BRPM

## 2021-07-06 DIAGNOSIS — Z01.818 PREOPERATIVE CLEARANCE: Primary | ICD-10-CM

## 2021-07-06 DIAGNOSIS — E78.2 MIXED HYPERLIPIDEMIA: ICD-10-CM

## 2021-07-06 DIAGNOSIS — M19.90 ARTHRITIS: ICD-10-CM

## 2021-07-06 DIAGNOSIS — I10 ESSENTIAL HYPERTENSION: ICD-10-CM

## 2021-07-06 DIAGNOSIS — M17.0 PRIMARY OSTEOARTHRITIS OF BOTH KNEES: ICD-10-CM

## 2021-07-06 DIAGNOSIS — Z95.0 CARDIAC PACEMAKER: ICD-10-CM

## 2021-07-06 DIAGNOSIS — N18.31 STAGE 3A CHRONIC KIDNEY DISEASE (HCC): ICD-10-CM

## 2021-07-06 DIAGNOSIS — M19.011 PRIMARY OSTEOARTHRITIS OF RIGHT SHOULDER: ICD-10-CM

## 2021-07-06 DIAGNOSIS — Z01.818 PRE-OP EVALUATION: Primary | ICD-10-CM

## 2021-07-06 DIAGNOSIS — M81.0 AGE-RELATED OSTEOPOROSIS WITHOUT CURRENT PATHOLOGICAL FRACTURE: ICD-10-CM

## 2021-07-06 LAB
ANION GAP SERPL CALCULATED.3IONS-SCNC: 10 MMOL/L (ref 4–16)
BACTERIA: NEGATIVE /HPF
BASOPHILS ABSOLUTE: 0.1 K/CU MM
BASOPHILS RELATIVE PERCENT: 0.6 % (ref 0–1)
BILIRUBIN URINE: NEGATIVE MG/DL
BLOOD, URINE: NEGATIVE
BUN BLDV-MCNC: 22 MG/DL (ref 6–23)
CALCIUM SERPL-MCNC: 10.6 MG/DL (ref 8.3–10.6)
CAST TYPE: NORMAL /HPF
CHLORIDE BLD-SCNC: 103 MMOL/L (ref 99–110)
CLARITY: CLEAR
CO2: 24 MMOL/L (ref 21–32)
COLOR: YELLOW
CREAT SERPL-MCNC: 1.3 MG/DL (ref 0.9–1.3)
CRYSTAL TYPE: NEGATIVE /HPF
DIFFERENTIAL TYPE: ABNORMAL
EOSINOPHILS ABSOLUTE: 0.2 K/CU MM
EOSINOPHILS RELATIVE PERCENT: 2.9 % (ref 0–3)
EPITHELIAL CELLS, UA: NEGATIVE /HPF
GFR AFRICAN AMERICAN: >60 ML/MIN/1.73M2
GFR NON-AFRICAN AMERICAN: 53 ML/MIN/1.73M2
GLUCOSE FASTING: 93 MG/DL (ref 70–99)
GLUCOSE, URINE: NEGATIVE MG/DL
HCT VFR BLD CALC: 42.6 % (ref 42–52)
HEMOGLOBIN: 14.4 GM/DL (ref 13.5–18)
IMMATURE NEUTROPHIL %: 0.5 % (ref 0–0.43)
KETONES, URINE: NEGATIVE MG/DL
LEUKOCYTE ESTERASE, URINE: NEGATIVE
LYMPHOCYTES ABSOLUTE: 2.9 K/CU MM
LYMPHOCYTES RELATIVE PERCENT: 36.1 % (ref 24–44)
MCH RBC QN AUTO: 32.3 PG (ref 27–31)
MCHC RBC AUTO-ENTMCNC: 33.8 % (ref 32–36)
MCV RBC AUTO: 95.5 FL (ref 78–100)
MONOCYTES ABSOLUTE: 1 K/CU MM
MONOCYTES RELATIVE PERCENT: 12.5 % (ref 0–4)
NITRITE URINE, QUANTITATIVE: NEGATIVE
PDW BLD-RTO: 12.7 % (ref 11.7–14.9)
PH, URINE: 7 (ref 5–8)
PLATELET # BLD: 242 K/CU MM (ref 140–440)
PMV BLD AUTO: 10.2 FL (ref 7.5–11.1)
POTASSIUM SERPL-SCNC: 4.4 MMOL/L (ref 3.5–5.1)
PROTEIN UA: NEGATIVE MG/DL
RBC # BLD: 4.46 M/CU MM (ref 4.6–6.2)
RBC URINE: NEGATIVE /HPF (ref 0–3)
SEGMENTED NEUTROPHILS ABSOLUTE COUNT: 3.8 K/CU MM
SEGMENTED NEUTROPHILS RELATIVE PERCENT: 47.4 % (ref 36–66)
SODIUM BLD-SCNC: 137 MMOL/L (ref 135–145)
SPECIFIC GRAVITY UA: 1.02 (ref 1–1.03)
TOTAL IMMATURE NEUTOROPHIL: 0.04 K/CU MM
UROBILINOGEN, URINE: 0.2 MG/DL (ref 0.2–1)
WBC # BLD: 8 K/CU MM (ref 4–10.5)
WBC UA: NEGATIVE /HPF (ref 0–2)

## 2021-07-06 PROCEDURE — 36415 COLL VENOUS BLD VENIPUNCTURE: CPT

## 2021-07-06 PROCEDURE — 81001 URINALYSIS AUTO W/SCOPE: CPT

## 2021-07-06 PROCEDURE — 85025 COMPLETE CBC W/AUTO DIFF WBC: CPT

## 2021-07-06 PROCEDURE — 99214 OFFICE O/P EST MOD 30 MIN: CPT | Performed by: INTERNAL MEDICINE

## 2021-07-06 PROCEDURE — 93000 ELECTROCARDIOGRAM COMPLETE: CPT | Performed by: INTERNAL MEDICINE

## 2021-07-06 PROCEDURE — 80048 BASIC METABOLIC PNL TOTAL CA: CPT

## 2021-07-06 RX ORDER — FENOFIBRATE 160 MG/1
160 TABLET ORAL DAILY
Qty: 90 TABLET | Refills: 1 | Status: SHIPPED | OUTPATIENT
Start: 2021-07-06 | End: 2022-02-21 | Stop reason: ALTCHOICE

## 2021-07-06 ASSESSMENT — ENCOUNTER SYMPTOMS
RESPIRATORY NEGATIVE: 1
EYE PAIN: 0
EYES NEGATIVE: 1
WHEEZING: 0
EYE REDNESS: 0
SHORTNESS OF BREATH: 0
GASTROINTESTINAL NEGATIVE: 1
COUGH: 0

## 2021-07-06 NOTE — PROGRESS NOTES
I was requested for cardiac clearance for anticipated shoulder surgery on 7/16/2021 for this patient who has osteoarthritis of right shoulder. Chart is reviewed. Has not been seen in person since 2019. I spoke to him over the phone he denies any unusual shortness of breath or chest pain. Last stress test was in 2001 and it was abnormal suggesting apical ischemia and ejection fraction was 40%. Patient had not had much cardiac symptoms and not had any repeat test done. His EKG has been abnormal with chronic left bundle branch block and PVCs. Due to his age and multiple risk factors including hypertension hyperlipidemia I would recommend repeating pharmacological stress testing for further cardiovascular risk stratification for noncardiac surgery. I have discussed this with the patient and he is in agreement.

## 2021-07-06 NOTE — PROGRESS NOTES
Aaliyah Orozco  Patient's  is 1935  Seen in office on 2021      SUBJECTIVE:  Hang Ramos bre 82 y. o.year old male presents today   Chief Complaint   Patient presents with   Sonja Her Pre-op Exam     right shoulder replacement 2021     Medication Refill     Patient is an 72-year-old with a history of hypertension, hyperlipidemia, cardiac pacemaker and osteoporosis came here for clearance for surgery. Pt has chronic pain in the right shoulder pain. He had previous surgery for fracture several years ago about   Recently he is having increased pain and restricted movement of the right shoulder. Has developed degenerative arthritis of the right shoulder  He saw orthopedic surgeon Dr Sanam Watts at [facility] and is planning to do right shoulder replacement on 2021. Pt states he is doing well. Has no complaints other than right shoulder pain  No chest pain. No SOB. No pedal edema  He is active and ambulates well. He can go to up and down the stairs easily. No SOB. Taking medications regularly. No side effects noted. Review of Systems   Constitutional: Negative for chills, diaphoresis and fever. HENT: Negative. Eyes: Negative. Negative for pain, redness and visual disturbance. Respiratory: Negative. Negative for cough, shortness of breath and wheezing. Cardiovascular: Negative for chest pain, palpitations and leg swelling. Gastrointestinal: Negative. Endocrine: Negative. Genitourinary: Positive for frequency and urgency. Musculoskeletal: Negative. Right shoulder pain    Skin: Negative. Neurological: Negative. Hematological: Negative. Psychiatric/Behavioral: Negative.         OBJECTIVE: /68   Pulse 68   Temp 99.2 °F (37.3 °C)   Resp 16   Ht 5' 10\" (1.778 m)   Wt 240 lb 9.6 oz (109.1 kg)   SpO2 96%   BMI 34.52 kg/m²     Wt Readings from Last 3 Encounters:   21 240 lb 9.6 oz (109.1 kg)   21 238 lb (108 kg)   05/10/21 239 lb 6.4 oz (108.6 kg)       Patient was seen taking COVID-19 precautions. Face mask, gloves were used. Patient also wore facemask. GENERAL: - Alert, oriented, pleasant, in no apparent distress. HEENT: - Conjunctiva pink, no scleral icterus. ENT clear. NECK: -Supple. No jugular venous distention noted. No masses felt,  CARDIOVASCULAR: - Normal S1 and S2    PULMONARY: - No respiratory distress. No wheezes or rales. ABDOMEN: - Soft and non-tender,no masses  ororganomegaly. EXTREMITIES: - No cyanosis, clubbing, or significant edema. Right shoulder movements are restricted. Abduction is about 30 to 40 degrees. SKIN: Skin is warm and dry. NEUROLOGICAL: - Cranial nerves II through XII are grossly intact. IMPRESSION:    Encounter Diagnoses   Name Primary?  Preoperative clearance Yes    Primary osteoarthritis of right shoulder     Mixed hyperlipidemia     Essential hypertension     Cardiac pacemaker     Arthritis     Age-related osteoporosis without current pathological fracture     Stage 3a chronic kidney disease (HCC)     Primary osteoarthritis of both knees        ASSESSMENT/PLAN:     Preoperative clearance   Patient has no history of coronary artery disease,. No history of congestive heart failure. He has no angina. No pedal edema. He has a cardiac pacemaker and sees cardiologist.  He is going to get clearance from cardiologist also. We will send patient for the labs CBC, basic metabolic panel and urinalysis  EKG done   Patient will get labs CBC, BMP and UA at the [facility] hypertension   Patient has hypertension that is controlled. Continue metoprolol, lisinopril with hydrochlorothiazide. Advised patient to take blood pressure medication especially metoprolol with a sip of water on the day of surgery. Cardiac pacemaker   For Mobitz type II AV block 11/2006.  Sees Dr. 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 Ever Ornelas H/O echocardiogram 06    Mild pulmonary hypertension,mild aortic root dilation EF 50-55%    History of pacemaker 2006    Medtronic    Hyperlipidemia     Hypertension     Melanoma ([facility] [address] of neck and upper back 5/3/2016    Right side of neck below the right ear Upper back. Seen Dr. Ca Mariscal in [place]. [place] Excised completely per pt.  Billitz (type) II atrioventricular block 06    Osteopenia     Pacemaker at end of battery life 2016    Primary osteoarthritis of both knees 2/3/2016    DJD of both knees. No surgery.  Seen Ortho at [place] in the past     Stage 3 chronic kidney disease ([facility] [address] 2018     Past Surgical History:   Procedure Laterality Date    FRACTURE SURGERY      Had acute accident broken sternum & brused heart 1953-Lt leg plate, 5453-VD wrist x 2, ?-Rt shoulder x 2, ?-Lt hip fracture with plates and rods    HIP SURGERY Left     PACEMAKER PLACEMENT  2006    PACEMAKER PLACEMENT  2016    Generator Changeout for End of Life    SKIN CANCER EXCISION  3/2015     Social History     Tobacco Use    Smoking status: Former Smoker     Packs/day: 3.00     Years: 6.00     Pack years: 18.00     Start date: 8/15/1961     Quit date: 10/27/1965     Years since quittin.7    Smokeless tobacco: Never Used   Substance Use Topics    Alcohol use: No     Alcohol/week: 0.0 standard drinks       LAB REVIEW:  CBC:   Lab Results   Component Value Date    WBC 7.9 2021    HGB 15.2 2021    HCT 46.4 2021     2021     Lipids:   Lab Results   Component Value Date    HDL 42 2021    LDLCALC 76 2019    LDLDIRECT 108 (H) 2021    TRIGLYCFAST 184 (H) 2021    CHOLFAST 172 2021     Renal:   Lab Results   Component Value Date    BUN 23 2021    CREATININE 1.2 2021     2021    K 4.4 2021    ALT 28 04/12/2021    AST 29 04/12/2021    GLUCOSE 91 04/16/2020    GLUF 93 04/12/2021     PT/INR:   Lab Results   Component Value Date    INR 0.96 08/01/2016     A1C: No results found for: Gavino Arguelles MD, 7/6/2021 , 1:22 PM

## 2021-07-06 NOTE — ASSESSMENT & PLAN NOTE
Patient has hypertension that is controlled. Continue metoprolol, lisinopril with hydrochlorothiazide. Advised patient to take blood pressure medication especially metoprolol with a sip of water on the day of surgery.

## 2021-07-06 NOTE — ASSESSMENT & PLAN NOTE
Patient has no history of coronary artery disease,. No history of congestive heart failure. He has no angina. No pedal edema. He has a cardiac pacemaker and sees cardiologist.  He is going to get clearance from cardiologist also. We will send patient for the labs CBC, basic metabolic panel and urinalysis  EKG done   Patient will get labs CBC, BMP and UA at the [facility] lab.

## 2021-07-06 NOTE — ASSESSMENT & PLAN NOTE
Patient has degenerative arthritis of the right shoulder and is going to have right shoulder surgery.

## 2021-07-09 ENCOUNTER — PROCEDURE VISIT (OUTPATIENT)
Dept: CARDIOLOGY CLINIC | Age: 86
End: 2021-07-09
Payer: MEDICARE

## 2021-07-09 DIAGNOSIS — E78.2 MIXED HYPERLIPIDEMIA: ICD-10-CM

## 2021-07-09 DIAGNOSIS — Z95.0 CARDIAC PACEMAKER IN SITU: ICD-10-CM

## 2021-07-09 DIAGNOSIS — Z01.818 PRE-OP EVALUATION: Primary | ICD-10-CM

## 2021-07-09 DIAGNOSIS — I10 ESSENTIAL HYPERTENSION: ICD-10-CM

## 2021-07-09 LAB
LV EF: 62 %
LVEF MODALITY: NORMAL

## 2021-07-09 PROCEDURE — 93015 CV STRESS TEST SUPVJ I&R: CPT | Performed by: INTERNAL MEDICINE

## 2021-07-09 PROCEDURE — A9500 TC99M SESTAMIBI: HCPCS | Performed by: INTERNAL MEDICINE

## 2021-07-09 PROCEDURE — 78452 HT MUSCLE IMAGE SPECT MULT: CPT | Performed by: INTERNAL MEDICINE

## 2021-07-14 ENCOUNTER — HOSPITAL ENCOUNTER (OUTPATIENT)
Age: 86
Setting detail: SPECIMEN
Discharge: HOME | End: 2021-07-14
Payer: MEDICARE

## 2021-07-14 ENCOUNTER — OFFICE VISIT (OUTPATIENT)
Dept: INTERNAL MEDICINE CLINIC | Age: 86
End: 2021-07-14
Payer: MEDICARE

## 2021-07-14 VITALS
OXYGEN SATURATION: 97 % | WEIGHT: 238.8 LBS | RESPIRATION RATE: 16 BRPM | SYSTOLIC BLOOD PRESSURE: 122 MMHG | HEIGHT: 70 IN | HEART RATE: 70 BPM | BODY MASS INDEX: 34.19 KG/M2 | DIASTOLIC BLOOD PRESSURE: 74 MMHG | TEMPERATURE: 97.4 F

## 2021-07-14 DIAGNOSIS — J20.9 ACUTE BRONCHITIS, UNSPECIFIED ORGANISM: Primary | ICD-10-CM

## 2021-07-14 PROCEDURE — 99213 OFFICE O/P EST LOW 20 MIN: CPT | Performed by: INTERNAL MEDICINE

## 2021-07-14 PROCEDURE — U0003 INFECTIOUS AGENT DETECTION BY NUCLEIC ACID (DNA OR RNA); SEVERE ACUTE RESPIRATORY SYNDROME CORONAVIRUS 2 (SARS-COV-2) (CORONAVIRUS DISEASE [COVID-19]), AMPLIFIED PROBE TECHNIQUE, MAKING USE OF HIGH THROUGHPUT TECHNOLOGIES AS DESCRIBED BY CMS-2020-01-R: HCPCS

## 2021-07-14 PROCEDURE — U0005 INFEC AGEN DETEC AMPLI PROBE: HCPCS

## 2021-07-14 RX ORDER — AZITHROMYCIN 250 MG/1
TABLET, FILM COATED ORAL
Qty: 1 PACKET | Refills: 0 | Status: SHIPPED | OUTPATIENT
Start: 2021-07-14 | End: 2021-07-28

## 2021-07-14 RX ORDER — DEXTROMETHORPHAN HYDROBROMIDE AND PROMETHAZINE HYDROCHLORIDE 15; 6.25 MG/5ML; MG/5ML
5 SYRUP ORAL 4 TIMES DAILY PRN
Qty: 180 ML | Refills: 0 | Status: SHIPPED | OUTPATIENT
Start: 2021-07-14 | End: 2021-07-28

## 2021-07-14 NOTE — PROGRESS NOTES
Brandan Farnsworth  Patient's  is 1935  Seen in office on 2021      SUBJECTIVE:  Rico Newberry bre 82 y. o.year old male presents today   Chief Complaint   Patient presents with    Congestion     Congestion/ sore throat/ raspy voice     Pt is having cough and congestion  Pt states he is coughing a lot  Sputum is thick today. Color clear  No fever or chills  No SOB  No abdominal pain. No NVD  Hoarseness positive. Taking medications regularly. No side effects noted. Patient is scheduled for shoulder surgery at the end of the week    Review of Systems    OBJECTIVE: /74   Pulse 70   Temp 97.4 °F (36.3 °C)   Resp 16   Ht 5' 10\" (1.778 m)   Wt 238 lb 12.8 oz (108.3 kg)   SpO2 97%   BMI 34.26 kg/m²     Wt Readings from Last 3 Encounters:   21 238 lb 12.8 oz (108.3 kg)   21 240 lb 9.6 oz (109.1 kg)   21 238 lb (108 kg)      GENERAL: - Alert, oriented, pleasant, in no apparent distress. HEENT: - Conjunctiva pink, no scleral icterus. ENT clear. NECK: -Supple. No jugular venous distention noted. No masses felt,  CARDIOVASCULAR: - Normal S1 and S2    PULMONARY: - No respiratory distress. No wheezes or rales. ABDOMEN: - Soft and non-tender,no masses  ororganomegaly. EXTREMITIES: - No cyanosis, clubbing, or significant edema. SKIN: Skin is warm and dry. NEUROLOGICAL: - Cranial nerves II through XII are grossly intact. IMPRESSION:    Encounter Diagnoses   Name Primary?  Acute bronchitis, unspecified organism Yes       ASSESSMENT/PLAN:    Patient has acute bronchitis with some shortness of breath.   O2 saturation is normal.  Patient is having coughing spells  Patient had COVID-19 vaccine  We will check for COVID-19 infection  We will give Z-Eris and Phenergan DM  Ordered chest x-ray  If symptoms get worse go to the emergency room  Discussed with patient orthopedic surgeon Dr. Brenda Morrell and informed him patient has bronchitis and to postpone the surgery until he improves. Patient to call back for the results    Mediations reviewed with the patient. Continue current medications. Appropriate prescriptions are addressed. After visit summeryprovided. Follow up as directed sooner if needed. Questions answered and patient verbalizes understanding. Call for any problems, questions, or concerns. No Known Allergies  Current Outpatient Medications   Medication Sig Dispense Refill    fenofibrate (TRIGLIDE) 160 MG tablet Take 1 tablet by mouth daily 90 tablet 1    lisinopril-hydroCHLOROthiazide (PRINZIDE;ZESTORETIC) 20-12.5 MG per tablet Take 1 tablet by mouth daily 90 tablet 1    metoprolol tartrate (LOPRESSOR) 25 MG tablet Take 1 tablet by mouth 2 times daily 180 tablet 1    Omega-3 Fatty Acids (FISH OIL) 1000 MG CPDR Take 1 capsule by mouth 2 times daily      Cholecalciferol (VITAMIN D3) 2000 UNITS CAPS Take 2,000 Units by mouth daily.  aspirin 81 MG tablet Take 81 mg by mouth daily.  Calcium Citrate (CITRACAL PO) Take 1 tablet by mouth daily       Multiple Vitamin (MULTIVITAMIN PO) Take  by mouth daily.  GLUCOSAMINE HCL Take  by mouth daily. No current facility-administered medications for this visit. Past Medical History:   Diagnosis Date    Arthritis     right shoulder and knees    DJD (degenerative joint disease) of knee 6/3/2014    H/O 24 hour EKG monitoring 11/28/06    Abnormal-LBBB with second degree mobitz type 2    H/O cardiovascular stress test 10/17/01    Abnormal-apical ischemia in the distribution of distal LAD EF40%    H/O colonoscopy 2004, 7/09    Dr. Amrita Bellamy H/O echocardiogram 12/12/06    Mild pulmonary hypertension,mild aortic root dilation EF 50-55%    History of pacemaker 12/14/2006    Medtronic    Hyperlipidemia     Hypertension     Melanoma ([address] of neck and upper back 5/3/2016    Right side of neck below the right ear Upper back. Seen Dr. Haydee Coats in [place]. [place] Excised completely per pt.      Elaina (type) II atrioventricular block 06    Osteopenia     Pacemaker at end of battery life 2016    Primary osteoarthritis of both knees 2/3/2016    DJD of both knees. No surgery.  Seen Ortho at [address] [place] [address] in the past     Stage 3 chronic kidney disease ([facility] [address] 2018     Past Surgical History:   Procedure Laterality Date    FRACTURE SURGERY      Had acute accident broken sternum & brused heart 1953-Lt leg plate, 6146-HL wrist x 2, ?-Rt shoulder x 2, ?-Lt hip fracture with plates and rods    HIP SURGERY Left     PACEMAKER PLACEMENT  2006    PACEMAKER PLACEMENT  2016    Generator Changeout for End of Life    SKIN CANCER EXCISION  3/2015     Social History     Tobacco Use    Smoking status: Former Smoker     Packs/day: 3.00     Years: 6.00     Pack years: 18.00     Start date: 8/15/1961     Quit date: 10/27/1965     Years since quittin.7    Smokeless tobacco: Never Used   Substance Use Topics    Alcohol use: No     Alcohol/week: 0.0 standard drinks       LAB REVIEW:  CBC:   Lab Results   Component Value Date    WBC 8.0 2021    HGB 14.4 2021    HCT 42.6 2021     2021     Lipids:   Lab Results   Component Value Date    HDL 42 2021    LDLCALC 76 2019    LDLDIRECT 108 (H) 2021    TRIGLYCFAST 184 (H) 2021    CHOLFAST 172 2021     Renal:   Lab Results   Component Value Date    BUN 22 2021    CREATININE 1.3 2021     2021    K 4.4 2021    ALT 28 2021    AST 29 2021    GLUCOSE 91 2020    GLUF 93 2021     PT/INR:   Lab Results   Component Value Date    INR 0.96 2016     A1C: No results found for: Gabby Terrell MD, 2021 , 2:26 PM

## 2021-07-15 LAB
SARS-COV-2: NOT DETECTED
SOURCE: NORMAL

## 2021-07-28 ENCOUNTER — TELEPHONE (OUTPATIENT)
Dept: INTERNAL MEDICINE CLINIC | Age: 86
End: 2021-07-28

## 2021-07-28 ENCOUNTER — OFFICE VISIT (OUTPATIENT)
Dept: INTERNAL MEDICINE CLINIC | Age: 86
End: 2021-07-28
Payer: MEDICARE

## 2021-07-28 VITALS
WEIGHT: 237 LBS | TEMPERATURE: 98.5 F | RESPIRATION RATE: 16 BRPM | SYSTOLIC BLOOD PRESSURE: 120 MMHG | OXYGEN SATURATION: 96 % | DIASTOLIC BLOOD PRESSURE: 62 MMHG | BODY MASS INDEX: 33.93 KG/M2 | HEART RATE: 72 BPM | HEIGHT: 70 IN

## 2021-07-28 DIAGNOSIS — M19.011 PRIMARY OSTEOARTHRITIS OF RIGHT SHOULDER: ICD-10-CM

## 2021-07-28 DIAGNOSIS — I10 ESSENTIAL HYPERTENSION: ICD-10-CM

## 2021-07-28 DIAGNOSIS — Z01.818 PREOPERATIVE CLEARANCE: Primary | ICD-10-CM

## 2021-07-28 DIAGNOSIS — Z95.0 CARDIAC PACEMAKER: ICD-10-CM

## 2021-07-28 DIAGNOSIS — E78.2 MIXED HYPERLIPIDEMIA: ICD-10-CM

## 2021-07-28 PROCEDURE — 99214 OFFICE O/P EST MOD 30 MIN: CPT | Performed by: INTERNAL MEDICINE

## 2021-07-28 NOTE — PROGRESS NOTES
Jerman Luna  Patient's  is 1935  Seen in office on 2021      SUBJECTIVE:  Raj díaz 82 y. o.year old male presents today   Chief Complaint   Patient presents with    Other     needs surgical clearance for 21 for surgery on right shoulder [facility]    Other     talked with Dr Mundo Douglas by phone, [facility] has a cleareance from him     Pt has cough and congestion and as acute bronchitis and treated  Covid test was negative   Pt did not get chest x ay done  His symptoms has resolved  Labs from 21 reviewed : acceptable   NM stress test on 21 :   Summary    Supervising physician Dr. Mundo Douglas .    Left ventricular perfusion is abnormal most likely secondary to increased    subdiaphragmatic activity without ischemia.    Left ventricular function is normal with EF 62% . Pt is feeling well. No chest pain. No SOB  No HA no NVD  No fever or chills  No abdominal pain  No urinary symptoms   No cough , no edema or dyspnea  Taking medications regularly. No side effects noted. Review of Systems    OBJECTIVE: /62   Pulse 72   Temp 98.5 °F (36.9 °C) (Oral)   Resp 16   Ht 5' 10\" (1.778 m) Comment: with shoes  Wt 237 lb (107.5 kg)   SpO2 96%   BMI 34.01 kg/m²     Wt Readings from Last 3 Encounters:   21 237 lb (107.5 kg)   21 238 lb 12.8 oz (108.3 kg)   21 240 lb 9.6 oz (109.1 kg)        Patient was seen taking COVID-19 precautions. Face mask, gloves were used. Patient also wore facemask. GENERAL: - Alert, oriented, pleasant, in no apparent distress. HEENT: - Conjunctiva pink, no scleral icterus. ENT clear. NECK: -Supple. No jugular venous distention noted. No masses felt,  CARDIOVASCULAR: - Normal S1 and S2    PULMONARY: - No respiratory distress. No wheezes or rales. ABDOMEN: - Soft and non-tender,no masses  ororganomegaly. EXTREMITIES: - No cyanosis, clubbing, or significant edema. SKIN: Skin is warm and dry.    NEUROLOGICAL: - Cranial nerves II through XII are grossly intact. IMPRESSION:    Encounter Diagnoses   Name Primary?  Preoperative clearance Yes    Primary osteoarthritis of right shoulder     Mixed hyperlipidemia     Essential hypertension     Cardiac pacemaker        ASSESSMENT/PLAN:    Pt is doing well. He is cleared by cardiologist for sugery  Bronchitis has resolved. Pt has no fever , no cough , no SOB, no edema  Pt is ambulatory with out chest pain, cough or SOB  Pt is schedule for right shoulder surgery on 7/30/21  He is cleared for surgery . Pt states he is off of ASA for 10 days but took today by mistake one dose of ASA 81 mg   Advised pt not to take it till surgery . Mediations reviewed with the patient. Continue current medications. Appropriate prescriptions are addressed. After visit summeryprovided. Follow up as directed sooner if needed. Questions answered and patient verbalizes understanding. Call for any problems, questions, or concerns. No Known Allergies  Current Outpatient Medications   Medication Sig Dispense Refill    fenofibrate (TRIGLIDE) 160 MG tablet Take 1 tablet by mouth daily 90 tablet 1    lisinopril-hydroCHLOROthiazide (PRINZIDE;ZESTORETIC) 20-12.5 MG per tablet Take 1 tablet by mouth daily 90 tablet 1    metoprolol tartrate (LOPRESSOR) 25 MG tablet Take 1 tablet by mouth 2 times daily 180 tablet 1    Omega-3 Fatty Acids (FISH OIL) 1000 MG CPDR Take 1 capsule by mouth 2 times daily (Patient not taking: Reported on 7/28/2021)      Cholecalciferol (VITAMIN D3) 2000 UNITS CAPS Take 2,000 Units by mouth daily. (Patient not taking: Reported on 7/28/2021)      aspirin 81 MG tablet Take 81 mg by mouth daily. (Patient not taking: Reported on 7/28/2021)      Calcium Citrate (CITRACAL PO) Take 1 tablet by mouth daily  (Patient not taking: Reported on 7/28/2021)      Multiple Vitamin (MULTIVITAMIN PO) Take  by mouth daily.  GLUCOSAMINE HCL Take  by mouth daily.  (Patient not taking: Reported on 2021)       No current facility-administered medications for this visit. Past Medical History:   Diagnosis Date    Arthritis     right shoulder and knees    DJD (degenerative joint disease) of knee 6/3/2014    H/O 24 hour EKG monitoring 06    Abnormal-LBBB with second degree mobitz type 2    H/O cardiovascular stress test 10/17/01    Abnormal-apical ischemia in the distribution of distal LAD EF40%    H/O colonoscopy ,     Dr. Elisa Fried H/O echocardiogram 06    Mild pulmonary hypertension,mild aortic root dilation EF 50-55%    History of pacemaker 2006    Medtronic    Hyperlipidemia     Hypertension     Melanoma ([facility] [address] of neck and upper back 5/3/2016    Right side of neck below the right ear Upper back. Seen Dr. Maddie Devine in [facility]. [place] Excised completely per pt.  Mobitz (type) II atrioventricular block 06    Osteopenia     Pacemaker at end of battery life 2016    Primary osteoarthritis of both knees 2/3/2016    DJD of both knees. No surgery.  Seen Ortho at [facility] in the past     Stage 3 chronic kidney disease ([facility] [address] 2018     Past Surgical History:   Procedure Laterality Date    FRACTURE SURGERY      Had acute accident broken sternum & brused heart 1953-Lt leg plate, 7126-DB wrist x 2, ?-Rt shoulder x 2, ?-Lt hip fracture with plates and rods    HIP SURGERY Left     PACEMAKER PLACEMENT  2006    PACEMAKER PLACEMENT  2016    Generator Changeout for End of Life    SKIN CANCER EXCISION  3/2015     Social History     Tobacco Use    Smoking status: Former Smoker     Packs/day: 3.00     Years: 6.00     Pack years: 18.00     Start date: 8/15/1961     Quit date: 10/27/1965     Years since quittin.7    Smokeless tobacco: Never Used   Substance Use Topics    Alcohol use: No     Alcohol/week: 0.0 standard drinks       LAB REVIEW:  CBC:   Lab Results   Component Value Date    WBC 8.0 2021    HGB 14.4 2021 HCT 42.6 07/06/2021     07/06/2021     Lipids:   Lab Results   Component Value Date    HDL 42 04/12/2021    LDLCALC 76 02/12/2019    LDLDIRECT 108 (H) 04/12/2021    TRIGLYCFAST 184 (H) 04/12/2021    CHOLFAST 172 04/12/2021     Renal:   Lab Results   Component Value Date    BUN 22 07/06/2021    CREATININE 1.3 07/06/2021     07/06/2021    K 4.4 07/06/2021    ALT 28 04/12/2021    AST 29 04/12/2021    GLUCOSE 91 04/16/2020    GLUF 93 07/06/2021     PT/INR:   Lab Results   Component Value Date    INR 0.96 08/01/2016     A1C: No results found for: Maxx Salter MD, 7/28/2021 , 4:37 PM

## 2021-08-05 PROBLEM — Z01.818 PREOPERATIVE CLEARANCE: Status: RESOLVED | Noted: 2021-07-06 | Resolved: 2021-08-05

## 2021-08-10 ENCOUNTER — OFFICE VISIT (OUTPATIENT)
Dept: CARDIOLOGY CLINIC | Age: 86
End: 2021-08-10
Payer: MEDICARE

## 2021-08-10 ENCOUNTER — OFFICE VISIT (OUTPATIENT)
Dept: INTERNAL MEDICINE CLINIC | Age: 86
End: 2021-08-10
Payer: MEDICARE

## 2021-08-10 VITALS
WEIGHT: 233 LBS | RESPIRATION RATE: 16 BRPM | BODY MASS INDEX: 33.36 KG/M2 | HEIGHT: 70 IN | SYSTOLIC BLOOD PRESSURE: 126 MMHG | HEART RATE: 56 BPM | DIASTOLIC BLOOD PRESSURE: 60 MMHG

## 2021-08-10 VITALS
TEMPERATURE: 98.9 F | DIASTOLIC BLOOD PRESSURE: 68 MMHG | HEART RATE: 58 BPM | WEIGHT: 234.6 LBS | RESPIRATION RATE: 16 BRPM | BODY MASS INDEX: 33.66 KG/M2 | SYSTOLIC BLOOD PRESSURE: 100 MMHG | OXYGEN SATURATION: 97 %

## 2021-08-10 DIAGNOSIS — Z98.890 S/P SHOULDER SURGERY: ICD-10-CM

## 2021-08-10 DIAGNOSIS — Z95.0 CARDIAC PACEMAKER: ICD-10-CM

## 2021-08-10 DIAGNOSIS — M19.011 PRIMARY OSTEOARTHRITIS OF RIGHT SHOULDER: Primary | ICD-10-CM

## 2021-08-10 DIAGNOSIS — E78.2 MIXED HYPERLIPIDEMIA: ICD-10-CM

## 2021-08-10 DIAGNOSIS — I10 ESSENTIAL HYPERTENSION: ICD-10-CM

## 2021-08-10 DIAGNOSIS — N18.30 STAGE 3 CHRONIC KIDNEY DISEASE, UNSPECIFIED WHETHER STAGE 3A OR 3B CKD (HCC): ICD-10-CM

## 2021-08-10 DIAGNOSIS — Z95.0 CARDIAC PACEMAKER: Primary | ICD-10-CM

## 2021-08-10 DIAGNOSIS — M81.0 AGE-RELATED OSTEOPOROSIS WITHOUT CURRENT PATHOLOGICAL FRACTURE: ICD-10-CM

## 2021-08-10 PROCEDURE — 99213 OFFICE O/P EST LOW 20 MIN: CPT | Performed by: INTERNAL MEDICINE

## 2021-08-10 PROCEDURE — 99214 OFFICE O/P EST MOD 30 MIN: CPT | Performed by: INTERNAL MEDICINE

## 2021-08-10 NOTE — PATIENT INSTRUCTIONS
Continue current medications. Continue device check as per care link schedule. Follow-up in 12 months with EKG, sooner if needed.

## 2021-08-10 NOTE — ASSESSMENT & PLAN NOTE
Is been stable with a creatinine of 1.3. [facility]    CHIEF COMPLAINT:  Pain  -neck and low back pain    INTERVAL HISTORY:  Last seen on 1/16/2020.      Recommendations/plan at the last visit included:  1. Physical Therapy:  Not at this time  2. Clinical Health Psychologist:  NO, continue guided meditation, we could consider cognitive behavioral therapy/relaxation techniques, patient will consider this option.  3. Diagnostic Studies: none  4. Medication Management:                1.   Continue Gabapentin 1200 TID              2.   Stop Robaxin              3.   Continue Tizanidine 4mg at bedtime              4.   Continue Oxycodone 3-4/day  5. Further procedures recommended: none at this time  6. Recommendations to PCP. See above        Follow up with this provider:  8 Weeks     Since his last visit, Indra Mehta reports:    He states that the first 3-4 weeks were good, but the last 3 1/2 weeks have been bad. He states that he fell and hurt his shoulder as he put his arm out and landed on his shoulder. He states that his head whipped back and hit the ground. He states that he had a headache for 3-4 days. He states that his neck hurt for 2-3 weeks. He states that it is starting to feel more normal. He states that his biggest issue is his range of motion. He states that if he turns too much then he gets more severe pain. He states that he still has a lot of pain with keeping his head forward, from the base of his skull to the top of his shoulder blades. He states that he has to do some stretching and then can go back. 2 weeks ago he was put on a 90 day probation at work. He states that he has been crabby at work and this is part of why he is on probation. He states that last week he broke up with his girlfriend. He leaves for [place] tomorrow. He sent a message to his surgeon about his ROM and PT was recommended. He states that he has been doing PT.       Pain Information:   Pain quality: Miserable      Pain rating:  intensity ranges from 4/10 to 10/10, and averages 7/10 on a 0-10 scale.   Pain today 7/10    SELF CARE:   How often do you practice SELF-CARE (relaxing, stretching, pacing, monitoring posture, taking mini-breaks) in a typical day: Trying to pay attention to how he is sitting and holding his head    UDS: 4/1/19-reviewed and appropriate  Controlled Substance Agreement signed: 4/15/2019    CURRENT RELEVANT PAIN MEDICATIONS:  Gabapentin 400mg-Taking 3 capsules 3 times a day  Tizanidine 4mg-taking 1 at HS   Tylenol 500mg-1000mg 4x/day  Oxycodone-taking 4/day  Xanax-does not use daily, last use was about 3 weeks ago  Nortriptyline 10mg at bedtime    Patient is using the medication as prescribed:  YES  Is your medication helpful? YES   Medication side effects? no side effect    Previous Medications: (H--helped; HI--Helped initially; SWH-- somewhat helpful, NH--No help; W--worse; SE--side effects)   Norco/vicodin H  Oxycodone H  Flexeril - was helpful at night  Robaxin - not helpful  Tizanidine H  Gabapentin ?  Lyrica SE sedation  Cymbalta ?    Past Pain Treatments:  Injections:    - 11/8/18 bilateral L3-4 TFESI - (Dr Mcdaniel)  - 8/23/18 bilateral L3-4 TFESI - seems to have worsened pain (Dr Mcdaniel)  - 6/11/18 TFESI right C5-6 - helped with arm pain and numbness  - bilateral L3-4 TFESI 12/28/17  - Bilateral L4-5 TFESI 10/23/17  - Bilateral L3-4 TFESI 11/08/2018  -Bilateral L3-4 TFESI 6/14/2019  -C6-7 SRIDEVI 8/22/2019  Surgery:  ACDF C6-7 on 7/5/17 with improvement; ACDF C5-6 12/19/2018, posterior C5-7 fusion. Lateral mass screws, right C5-6 medial facetectomy, right C6 foraminotomy on 10/29/2019  TENS unit: helpful  Physical therapy in [facility] Board of Pharmacy Data Base Reviewed:    YES; As expected, no concern for misuse/abuse of controlled medications based on this report.    THE 4 As OF OPIOID MAINTENANCE ANALGESIA    Analgesia: Is pain relief clinically significant? YES   Activity: Is patient functional and able  to perform Activities of Daily Living? YES   Adverse effects: Is patient free from adverse side effects from opiates? YES   Adherence to Rx protocol: Is patient adhering to Controlled Substance Agreement and taking medications ONLY as ordered? YES       Is Narcan prescribed for opiate use >50 MME daily? N/A      Daily MME: 30    Medications:  Current Outpatient Medications   Medication Sig Dispense Refill     Acetaminophen (TYLENOL PO) Take 500 mg by mouth every 4 hours as needed for mild pain or fever       ALPRAZolam (XANAX XR) 0.5 MG 24 hr tablet Take 1 tablet (0.5 mg) by mouth At Bedtime 30 tablet 3     gabapentin (NEURONTIN) 400 MG capsule Take 3 capsules (1,200 mg) by mouth 3 times daily 270 capsule 3     losartan (COZAAR) 50 MG tablet Take 1 tablet (50 mg) by mouth daily 90 tablet 0     nortriptyline (PAMELOR) 25 MG capsule Take 1 capsule (25 mg) by mouth At Bedtime 30 capsule 0     order for DME Equipment being ordered: TENS Pads as directed 1 Device 0     oxyCODONE (ROXICODONE) 5 MG tablet Take 1 tab every 4-6 hours as needed for severe pain. MAX 4 TABS PER DAY. Okay to fill 3/12/20 start 3/18/20. 100 tablet 0     tiZANidine (ZANAFLEX) 4 MG tablet Take 1 tablet (4 mg) by mouth At Bedtime 90 tablet 0       Review of Systems: A 10-point review of systems was negative, with the exception of chronic pain issues, headache, hearing loss, ringing in the ears, high blood pressure, diarrhea, weakness, depression, anxiety, stress, and mood swings    Social History:  No changes since previous visit    Family history: no changes since previous visit    PHYSICAL EXAM:      Vitals:   BP (!) 158/88   Temp 98.9  F (37.2  C) (Temporal)   Wt 98.9 kg (218 lb)   BMI 28.96 kg/m    Body mass index is 28.96 kg/m .  Data Unavailable  218 lbs 0 oz      Constitutional: healthy, alert and no distress  HEENT: Head atraumatic, normocephalic. Eyes without conjunctival injection or jaundice. Neck supple. No obvious neck masses.    Skin: No rash, lesions, or petechiae of exposed skin.   Psychiatric/mental status: Alert, without lethargy or stupor. Appropriate affect. Mood normal. Tearful at times.    DIAGNOSTIC TESTS:  No new imaging to review    Assessment:  Indra Mehta is a 54 year old male who presents today for follow up regarding his:    1.  Cervicalgia  2.  S/p cervical spine fusion  3.  Myofascial pain  4. Chronic low back pain  5. Neuropathy  6. Chronic pain syndrome    Patient with worsening pain from an injury.  However he feels that the pain from the injury is improving.  Discussed with the patient that if this persists he should be evaluated.  He verbalized his understanding.  We talked about increasing his nortriptyline.  He would like to proceed with that.  We also talked about considering Butrans.  We can discuss this further in 4 weeks.  He will continue his physical therapy exercises as able.      Plan:    Diagnosis reviewed, treatment option addressed, and risk/benifits discussed.  Self-care instructions given.  I am recommending a multidisciplinary treatment plan to help this patient better manage pain.      1. Physical Therapy:  Not at this time, but he should continue his exercises  2. Clinical Health Psychologist:  NO, continue guided meditation, we could consider cognitive behavioral therapy/relaxation techniques, patient will consider this option.  3. Diagnostic Studies: none  4. Medication Management:    1. Continue Gabapentin 1200 TID  2. Increase nortriptyline to 25 mg at bedtime.  He did just  a prescription for the 10 mg therefore he can take 2 of these at bedtime.  3. Continue Tizanidine 4mg at bedtime  4. Continue Oxycodone 3-4/day, okay for an early refill as the patient is leaving for [place] tomorrow  5. Further procedures recommended: none at this time, for considering a neck injection would want to get the okay from his surgeon  6. Recommendations to PCP. See above  7. The patient will need an  updated urine drug screen and opiate agreement at his next visit      Follow up with this provider:  4 Weeks     Total time spent face to face was 15 minutes and more than 50% of face to face time was spent in counseling and/or coordination of care regarding the diagnosis and recommendations above.      Chiara Garcia PA-C   [facility]

## 2021-08-10 NOTE — ASSESSMENT & PLAN NOTE
Well-controlled on current dosage of lisinopril hydrochlorothiazide and metoprolol. Continue the same.

## 2021-08-10 NOTE — PROGRESS NOTES
Po Paredes (:  1935) is a 82 y.o. male,     Chief Complaint   Patient presents with    Hypertension     Pt denies any new cardiac concerns. Pt is non-smoker.  Hyperlipidemia     Patient is here for follow up for hypertension,  Hyperlipidemia and permanent pacemaker. Patient had right shoulder replacement done recently and is going through physical therapy and he tolerated the procedure well. Denies any cardiac symptoms today. He is fully vaccinated against COVID-19. Medications are reviewed and he is compliant with medications. No Known Allergies  Prior to Admission medications    Medication Sig Start Date End Date Taking? Authorizing Provider   fenofibrate (TRIGLIDE) 160 MG tablet Take 1 tablet by mouth daily 21  Yes Marita Johnson MD   lisinopril-hydroCHLOROthiazide ALVARADO [facility]) 20-12.5 MG per tablet Take 1 tablet by mouth daily 5/10/21  Yes Marita Johnson MD   metoprolol tartrate (LOPRESSOR) 25 MG tablet Take 1 tablet by mouth 2 times daily 5/10/21  Yes Marita Johnson MD   Omega-3 Fatty Acids (FISH OIL) 1000 MG CPDR Take 1,000 mg by mouth 2 times daily  17  Yes Marita Johnson MD   Cholecalciferol (VITAMIN D3) 2000 UNITS CAPS Take 2,000 Units by mouth daily    Yes Historical Provider, MD   aspirin 81 MG tablet Take 81 mg by mouth daily     Yes Historical Provider, MD   Calcium Citrate (CITRACAL PO) Take 1 tablet by mouth daily    Yes Historical Provider, MD   Multiple Vitamin (MULTIVITAMIN PO) Take  by mouth daily.    Yes Historical Provider, MD   GLUCOSAMINE HCL Take by mouth daily    Yes Historical Provider, MD     Past Medical History:   Diagnosis Date    Arthritis     right shoulder and knees    DJD (degenerative joint disease) of knee 6/3/2014    H/O 24 hour EKG monitoring 06    Abnormal-LBBB with second degree mobitz type 2    H/O cardiovascular stress test 10/17/01    Abnormal-apical ischemia in the distribution of distal LAD EF40%    H/O colonoscopy , 7/09    Dr. Nkechi [place] H/O echocardiogram 12/12/06    Mild pulmonary hypertension,mild aortic root dilation EF 50-55%    History of pacemaker 12/14/2006    Medtronic    Hyperlipidemia     Hypertension     Melanoma ([facility] [address] of neck and upper back 5/3/2016    Right side of neck below the right ear Upper back. Seen Dr. Kris Jung in [place]. [place] Excised completely per pt.  Mobitz (type) II atrioventricular block 11/28/06    Osteopenia     Pacemaker at end of battery life 08/03/2016    Primary osteoarthritis of both knees 2/3/2016    DJD of both knees. No surgery. Seen Ortho at [place] in the past     Stage 3 chronic kidney disease ([facility] [address] 8/28/2018      Vitals:    08/10/21 1404   BP: 126/60   Pulse: 56   Resp: 16   Weight: 233 lb (105.7 kg)   Height: 5' 10\" (1.778 m)      Body mass index is 33.43 kg/m². Wt Readings from Last 3 Encounters:   08/10/21 233 lb (105.7 kg)   07/28/21 237 lb (107.5 kg)   07/14/21 238 lb 12.8 oz (108.3 kg)     Constitutional:  Patient is moderately overweight pleasant male in no apparent distress. HEENT: He is wearing glasses and facemask. Cardiovascular: Auscultation: Normal S1 and S2. No significant murmurs noted. Respiratory:  Respiratory effort is normal. Breath sounds are clear to auscultation. Extremities: Right shoulder and elbow is in a sling. No edema noted in both feet  Abdomen:  No masses or tenderness. No organomegaly noted. Neurologic:  Oriented to time, place, and person and non-anxious. No focal neurological deficit noted. Psychiatric: Normal mood and effect. Myocardial perfusion scan on July 9, 2021 reported  Left ventricular perfusion is abnormal most likely secondary to increased    subdiaphragmatic activity without ischemia.    Left ventricular function is normal with EF 62% .      Pacer analysis from June 28, 2021 is reviewed is consistent with normal dual-chamber MRI safe Medtronic Advisa pacer function with stable leads and appropriate battery status for the age of the device. Remaining average battery life is 5.5 years. Device is programmed to DDDR mode lower rate of 60 bpm and 96% pacing in the atrium and sensing in the ventricle.     Pertinent records reviewed and discussed with patient and results are as follow:    Lab Results   Component Value Date    WBC 8.0 07/06/2021    HGB 14.4 07/06/2021    HCT 42.6 07/06/2021     07/06/2021     Lab Results   Component Value Date    CHOL 168 02/03/2017    CHOLFAST 172 04/12/2021    TRIG 221 (H) 02/03/2017    TRIGLYCFAST 184 (H) 04/12/2021    HDL 42 04/12/2021    LDLCALC 76 02/12/2019    LDLDIRECT 108 (H) 04/12/2021     Lab Results   Component Value Date    BUN 22 07/06/2021    CREATININE 1.3 07/06/2021     07/06/2021    K 4.4 07/06/2021     Lab Results   Component Value Date    INR 0.96 08/01/2016     ASSESSMENT/PLAN:    1. Cardiac pacemaker  Assessment & Plan:  Working well with remaining device longevity of 5.5 years. We will continue to monitor as per [facility] schedule. 2. Essential hypertension  Assessment & Plan:  Well-controlled on current dosage of lisinopril hydrochlorothiazide and metoprolol. Continue the same. 3. Mixed hyperlipidemia  Assessment & Plan:  Last LDL was 108 and patient is on fish oil and fenofibrate. Does not have any known CAD or PAD. 4. Stage 3 chronic kidney disease, unspecified whether stage 3a or 3b CKD ([facility] [address]  Assessment & Plan:  Is been stable with a creatinine of 1.3. Continue current medications. Continue device check as per care link schedule. Follow-up in 12 months with EKG, sooner if needed. An electronic signature was used to authenticate this note.     --Jeancarlos Ochoa MD

## 2021-08-10 NOTE — PROGRESS NOTES
Arleen Morel  Patient's  is 1935  Seen in office on 8/10/2021      SUBJECTIVE:  Violet díaz 82 y. o.year old male presents today   Chief Complaint   Patient presents with    Follow-up     right shoulder surgery 2021      Pt had right shoulder surgery reverse impantation on 21  Did well post op and was d/c home same day  Pt states he is pain free  Pt has a sling of the right arm  No swelling   Pt is feeling well  No chest pain. No SOB. No HA  No NVD>  Taking medications regularly. No side effects noted. Review of Systems  Review of system normal except as in HPI  OBJECTIVE: /68   Pulse 58   Temp 98.9 °F (37.2 °C) (Oral)   Resp 16   Wt 234 lb 9.6 oz (106.4 kg)   SpO2 97%   BMI 33.66 kg/m²     Wt Readings from Last 3 Encounters:   08/10/21 234 lb 9.6 oz (106.4 kg)   08/10/21 233 lb (105.7 kg)   21 237 lb (107.5 kg)      Patient was seen taking COVID-19 precautions. Face mask, gloves were used. Patient also wore facemask. GENERAL: - Alert, oriented, pleasant, in no apparent distress. HEENT: - Conjunctiva pink, no scleral icterus. ENT clear. NECK: -Supple. No jugular venous distention noted. No masses felt,  CARDIOVASCULAR: - Normal S1 and S2    PULMONARY: - No respiratory distress. No wheezes or rales. ABDOMEN: - Soft and non-tender,no masses  ororganomegaly. EXTREMITIES: - No cyanosis, clubbing, or significant edema. SKIN: Skin is warm and dry. NEUROLOGICAL: - Cranial nerves II through XII are grossly intact. IMPRESSION:    Encounter Diagnoses   Name Primary?  Primary osteoarthritis of right shoulder Yes    S/P shoulder surgery     Mixed hyperlipidemia     Essential hypertension     Cardiac pacemaker     Age-related osteoporosis without current pathological fracture     Stage 3 chronic kidney disease, unspecified whether stage 3a or 3b CKD (formerly [facility])        ASSESSMENT/PLAN:    .  Right shoulder surgery. Patient is recuperating well  .   Patient has sling in the right arm  . Hyperlipidemia continue current medications on atorvastatin and omega-3  . Hypertension controlled  . Cardiac pacemaker follow-up with cardiologist  .  Osteoporosis. Continue current medication  . CKD stable      Return to office in 3 months. Mediations reviewed with the patient. Continue current medications. Appropriate prescriptions are addressed. After visit summeryprovided. Follow up as directed sooner if needed. Questions answered and patient verbalizes understanding. Call for any problems, questions, or concerns. No Known Allergies  Current Outpatient Medications   Medication Sig Dispense Refill    fenofibrate (TRIGLIDE) 160 MG tablet Take 1 tablet by mouth daily 90 tablet 1    lisinopril-hydroCHLOROthiazide (PRINZIDE;ZESTORETIC) 20-12.5 MG per tablet Take 1 tablet by mouth daily 90 tablet 1    metoprolol tartrate (LOPRESSOR) 25 MG tablet Take 1 tablet by mouth 2 times daily 180 tablet 1    Omega-3 Fatty Acids (FISH OIL) 1000 MG CPDR Take 1,000 mg by mouth 2 times daily       Cholecalciferol (VITAMIN D3) 2000 UNITS CAPS Take 2,000 Units by mouth daily       aspirin 81 MG tablet Take 81 mg by mouth daily        Calcium Citrate (CITRACAL PO) Take 1 tablet by mouth daily       Multiple Vitamin (MULTIVITAMIN PO) Take  by mouth daily.  GLUCOSAMINE HCL Take by mouth daily        No current facility-administered medications for this visit.      Past Medical History:   Diagnosis Date    Arthritis     right shoulder and knees    DJD (degenerative joint disease) of knee 6/3/2014    H/O 24 hour EKG monitoring 11/28/06    Abnormal-LBBB with second degree mobitz type 2    H/O cardiovascular stress test 10/17/01    Abnormal-apical ischemia in the distribution of distal LAD EF40%    H/O colonoscopy 2004, 7/09    Dr. Amrita Bellamy H/O echocardiogram 12/12/06    Mild pulmonary hypertension,mild aortic root dilation EF 50-55%    History of pacemaker 12/14/2006 Medtronic    Hyperlipidemia     Hypertension     Melanoma ([facility] [address] of neck and upper back 5/3/2016    Right side of neck below the right ear Upper back. Seen Dr. Marcy Cardenas in [facility]. [place] Excised completely per pt.  Elaina (type) II atrioventricular block 06    Osteopenia     Pacemaker at end of battery life 2016    Primary osteoarthritis of both knees 2/3/2016    DJD of both knees. No surgery.  Seen Ortho at [facility] in the past     Stage 3 chronic kidney disease ([facility] [address] 2018     Past Surgical History:   Procedure Laterality Date    FRACTURE SURGERY      Had acute accident broken sternum & brused heart 1953-Lt leg plate, 4588-TL wrist x 2, ?-Rt shoulder x 2, ?-Lt hip fracture with plates and rods    HIP SURGERY Left     PACEMAKER PLACEMENT  2006    PACEMAKER PLACEMENT  2016    Generator Changeout for End of Life    SKIN CANCER EXCISION  3/2015     Social History     Tobacco Use    Smoking status: Former Smoker     Packs/day: 3.00     Years: 6.00     Pack years: 18.00     Start date: 8/15/1961     Quit date: 10/27/1965     Years since quittin.8    Smokeless tobacco: Never Used   Substance Use Topics    Alcohol use: No     Alcohol/week: 0.0 standard drinks       LAB REVIEW:  CBC:   Lab Results   Component Value Date    WBC 8.0 2021    HGB 14.4 2021    HCT 42.6 2021     2021     Lipids:   Lab Results   Component Value Date    HDL 42 2021    LDLCALC 76 2019    LDLDIRECT 108 (H) 2021    TRIGLYCFAST 184 (H) 2021    CHOLFAST 172 2021     Renal:   Lab Results   Component Value Date    BUN 22 2021    CREATININE 1.3 2021     2021    K 4.4 2021    ALT 28 2021    AST 29 2021    GLUCOSE 91 2020    GLUF 93 2021     PT/INR:   Lab Results   Component Value Date    INR 0.96 2016     A1C: No results found for: Micaela King MD, 8/10/2021 , 3:17 PM

## 2021-08-10 NOTE — ASSESSMENT & PLAN NOTE
Working well with remaining device longevity of 5.5 years. We will continue to monitor as per [facility] schedule.

## 2021-09-09 PROBLEM — Z01.818 PRE-OP EVALUATION: Status: RESOLVED | Noted: 2021-07-06 | Resolved: 2021-09-09

## 2021-10-12 ENCOUNTER — PROCEDURE VISIT (OUTPATIENT)
Dept: CARDIOLOGY CLINIC | Age: 86
End: 2021-10-12
Payer: MEDICARE

## 2021-10-12 DIAGNOSIS — Z95.0 CARDIAC PACEMAKER IN SITU: Primary | ICD-10-CM

## 2021-10-12 PROCEDURE — 93294 REM INTERROG EVL PM/LDLS PM: CPT | Performed by: INTERNAL MEDICINE

## 2021-10-12 PROCEDURE — 93296 REM INTERROG EVL PM/IDS: CPT | Performed by: INTERNAL MEDICINE

## 2021-11-14 NOTE — PROGRESS NOTES
Patient Name:  Constanza Kendall  Patient :  1935  Patient MRN:  [id number]     Primary Oncologist: Dwight Whitney MD  Referring Provider: Santana Cox MD     Date of Service: 2021      Chief Complaint:    Chief Complaint   Patient presents with    Follow-up     Patient Active Problem List:     Mixed hyperlipidemia     Cardiac pacemaker     Essential hypertension     Primary osteoarthritis of both knees     Melanoma ([address] of neck and upper back     Stage 3 chronic kidney disease ([address]     Monocytosis     Age-related osteoporosis without current pathological fracture    HPI:   Constanza Kendall is a 82-year-old very pleasant gentleman with medical history significant for hypertension, hyperlipidemia, Mobitz type II AV block, status post pacemaker placement in 2016, chronic kidney disease, history of melanoma and osteoarthritis, referred to me on May 17, 2021 for evaluation of his relative monocytosis. He stated that he has been having relative monocytosis since 2017 and it has been quite stable over the course. His absolute monocyte count has always been normal. He does not have anemia, neutropenia or thrombocytopenia. Dr. Santana Cox has been monitoring it closely. Because of persistent relative monocytosis, he was subsequently referred to me for further evaluation. His absolute monocyte count has always been normal. He does not have anemia, neutropenia or thrombocytopenia. Dr. Santana Cox has been monitoring it closely. I agree with Dr. Konrad Victor and I recommend to continue with close observation. Since his absolute monocytosis is within normal range, I believe it is due to reactive process. No additional investigation needed at this moment. On 2021, he presented to me for follow-up. I have been following him for relative monocytosis and he has been under close observation.     I recognize that he has stable absolute monocyte count (1000/cumm) on 2021 blood test.  He does not have anemia or neutropenia. Since his absolute monocyte count is stable, I recommend to continue with observation. I will plan to see him back in 1 year. He does not have any significant symptoms at today visit. Past Medical History:     Significant for  1. Hypertension  2. Hyperlipidemia  3. Morbitz type II AV block status post dual-chamber pacemaker placement on August 12, 2016  4. Chronic kidney disease  5. Melanoma of neck and upper back status post excision  6. Osteoarthritis    Past Surgery History:    Significant for  1. Permanent pacemaker placement  2. Skin cancer excision  3. Left leg fracture surgery in 1953  4. Left wrist surgery in 1994  5. Left hip fracture surgery    Social History:   He is a former smoker and he quit smoking in 1963. He used to smoke approximately 2 packs a day for 5 years. He denies alcohol drinking or illicit drug abuse. Family History:    Significant for breast cancer in his sister and daughter. Allergies:  No known drug allergies. Review of Systems: \"Per interval history; otherwise 10 point ROS is negative. \"  His energy level is good, appetite and sleep are fine. He denies fever, chills, night sweats, cough, shortness of breath, chest pain, hemoptysis or palpitations. His bowel and bladder functions are normal. He doesn't have nausea, vomiting, abdominal pain, diarrhea, constipation, dysuria, loss of appetite, or weight loss. He denies neuropathy and he doesn't have bleeding or clotting issues. He denies any pain in his body. Denies anxiety or depression. The rest of the systems are unremarkable.      Vital Signs: BP (!) 144/70 (Site: Left Upper Arm, Position: Sitting, Cuff Size: Large Adult)   Pulse 80   Temp 97.6 °F (36.4 °C) (Temporal)   Resp 16   Ht 5' 10\" (1.778 m)   Wt 230 lb 6.4 oz (104.5 kg)   SpO2 97%   BMI 33.06 kg/m²      Physical Exam:  CONSTITUTIONAL: awake, alert, cooperative, no apparent distress   EYES: pupils equal, round and reactive to light, sclera clear, normal conjunctiva  ENT: Normocephalic, without obvious abnormality, atraumatic  NECK: supple, symmetrical, no jugular venous distension, no carotid bruits   HEMATOLOGIC/LYMPHATIC: no cervical, supraclavicular or axillary lymphadenopathy   LUNGS: VBS, no wheezes, clear to auscultation, no crackles, no rhonchi, no increased work of breathing,    CARDIOVASCULAR: regular rate and rhythm, normal S1 and S2, no murmur noted  ABDOMEN: normal bowel sounds x 4, soft, non-distended, non-tender, no masses palpated, no hepatosplenomegaly   MUSCULOSKELETAL: full range of motion noted, tone is normal  NEUROLOGIC: awake, alert, oriented to name, place and time. Motor skills grossly intact. SKIN: appears intact, normal skin color, normal texture, normal turgor, no jaundice.    EXTREMITIES: no cyanosis, no clubbing, no LE edema, no leg swelling,       Labs:  Hematology:  Lab Results   Component Value Date    WBC 8.0 07/06/2021    RBC 4.46 (L) 07/06/2021    HGB 14.4 07/06/2021    HCT 42.6 07/06/2021    MCV 95.5 07/06/2021    MCH 32.3 (H) 07/06/2021    MCHC 33.8 07/06/2021    RDW 12.7 07/06/2021     07/06/2021    MPV 10.2 07/06/2021    SEGSPCT 47.4 07/06/2021    EOSRELPCT 2.9 07/06/2021    BASOPCT 0.6 07/06/2021    LYMPHOPCT 36.1 07/06/2021    MONOPCT 12.5 (H) 07/06/2021    SEGSABS 3.8 07/06/2021    EOSABS 0.2 07/06/2021    BASOSABS 0.1 07/06/2021    LYMPHSABS 2.9 07/06/2021    MONOSABS 1.0 07/06/2021    DIFFTYPE AUTOMATED DIFFERENTIAL 07/06/2021     No results found for: ESR  Chemistry:  Lab Results   Component Value Date     07/06/2021    K 4.4 07/06/2021     07/06/2021    CO2 24 07/06/2021    BUN 22 07/06/2021    CREATININE 1.3 07/06/2021    GLUCOSE 91 04/16/2020    CALCIUM 10.6 07/06/2021    PROT 7.0 04/12/2021    LABALBU 4.4 04/12/2021    BILITOT 0.5 04/12/2021    ALKPHOS 47 04/12/2021    AST 29 04/12/2021    ALT 28 04/12/2021    LABGLOM 53 (L) 07/06/2021    GFRAA >60 07/06/2021    AGRATIO 2.0 02/12/2019    GLOB 2.1 02/12/2019     No results found for: MMA, LDH, HOMOCYSTEINE  No components found for: LD  Lab Results   Component Value Date    TSHHS 2.713 01/22/2011    T4FREE 1.06 01/22/2011    FT3 3.0 01/22/2011     Immunology:  Lab Results   Component Value Date    PROT 7.0 04/12/2021     No results found for: Alexis Bacca, KLFLCR  No results found for: B2M  Coagulation Panel:  Lab Results   Component Value Date    PROTIME 11.2 08/01/2016    INR 0.96 08/01/2016     Anemia Panel:  No results found for: Valentino Walker  Tumor Markers:  Lab Results   Component Value Date    PSA 0.84 01/24/2012        Observations:  No data recorded       Assessment   Relative monocytosis    Plan:                                                                                                                               Bailee Henley is a 82-year-old very pleasant gentleman who has been having relative monocytosis since July 2017 and it has been quite stable over the course. His absolute monocyte count has always been normal. He does not have anemia, neutropenia or thrombocytopenia. Dr. Edmundo Luke has been monitoring it closely. I agree with Dr. Jessica Caldwell and I recommend to continue with close observation. Since his absolute monocytosis is within normal range, I believe it is due to reactive process. No additional investigation needed at this moment. On 11/16/2021, he presented to me for follow-up. I have been following him for relative monocytosis and he has been under close observation. I recognize that he has stable absolute monocyte count (1000/cumm) on 7/6/2021 blood test.  He does not have anemia or neutropenia. Since his absolute monocyte count is stable, I recommend to continue with observation. I will plan to see him back in 1 year. I answered all his questions and concerns for today. I asked him to follow up with primary care physician on regular basis. Recent imaging and labs were reviewed and discussed with the patient.

## 2021-11-16 ENCOUNTER — OFFICE VISIT (OUTPATIENT)
Dept: ONCOLOGY | Age: 86
End: 2021-11-16
Payer: MEDICARE

## 2021-11-16 ENCOUNTER — HOSPITAL ENCOUNTER (OUTPATIENT)
Dept: INFUSION THERAPY | Age: 86
Discharge: HOME | End: 2021-11-16
Payer: MEDICARE

## 2021-11-16 VITALS
WEIGHT: 230.4 LBS | SYSTOLIC BLOOD PRESSURE: 144 MMHG | HEIGHT: 70 IN | DIASTOLIC BLOOD PRESSURE: 70 MMHG | OXYGEN SATURATION: 97 % | TEMPERATURE: 97.6 F | RESPIRATION RATE: 16 BRPM | BODY MASS INDEX: 32.99 KG/M2 | HEART RATE: 80 BPM

## 2021-11-16 DIAGNOSIS — D72.821 MONOCYTOSIS: Primary | ICD-10-CM

## 2021-11-16 PROCEDURE — 99211 OFF/OP EST MAY X REQ PHY/QHP: CPT

## 2021-11-16 PROCEDURE — 99213 OFFICE O/P EST LOW 20 MIN: CPT | Performed by: INTERNAL MEDICINE

## 2021-11-16 NOTE — PROGRESS NOTES
MA Rooming Questions  Patient: Doyle Landau  MRN: [id number]    Date: 11/16/2021        1. Do you have any new issues?   no         2. Do you need any refills on medications?    no    3. Have you had any imaging done since your last visit?   no    4. Have you been hospitalized or seen in the emergency room since your last visit here?   no    5. Did the patient have a depression screening completed today?  No    No data recorded     PHQ-9 Given to (if applicable):               PHQ-9 Score (if applicable):                     [] Positive     []  Negative              Does question #9 need addressed (if applicable)                     [] Yes    []  No               Sung Newman CMA

## 2021-11-18 ENCOUNTER — OFFICE VISIT (OUTPATIENT)
Dept: INTERNAL MEDICINE CLINIC | Age: 86
End: 2021-11-18
Payer: MEDICARE

## 2021-11-18 VITALS
SYSTOLIC BLOOD PRESSURE: 130 MMHG | HEART RATE: 72 BPM | WEIGHT: 230.98 LBS | RESPIRATION RATE: 16 BRPM | TEMPERATURE: 97.8 F | BODY MASS INDEX: 33.14 KG/M2 | OXYGEN SATURATION: 98 % | DIASTOLIC BLOOD PRESSURE: 68 MMHG

## 2021-11-18 DIAGNOSIS — Z95.0 CARDIAC PACEMAKER: ICD-10-CM

## 2021-11-18 DIAGNOSIS — E78.2 MIXED HYPERLIPIDEMIA: Primary | ICD-10-CM

## 2021-11-18 DIAGNOSIS — R26.9 GAIT DISTURBANCE: ICD-10-CM

## 2021-11-18 DIAGNOSIS — C43.9 MALIGNANT MELANOMA, UNSPECIFIED SITE (HCC): ICD-10-CM

## 2021-11-18 DIAGNOSIS — M17.0 PRIMARY OSTEOARTHRITIS OF BOTH KNEES: ICD-10-CM

## 2021-11-18 DIAGNOSIS — I10 ESSENTIAL HYPERTENSION: ICD-10-CM

## 2021-11-18 DIAGNOSIS — N18.30 STAGE 3 CHRONIC KIDNEY DISEASE, UNSPECIFIED WHETHER STAGE 3A OR 3B CKD (HCC): ICD-10-CM

## 2021-11-18 DIAGNOSIS — D72.821 MONOCYTOSIS: ICD-10-CM

## 2021-11-18 DIAGNOSIS — Z98.890 S/P SHOULDER SURGERY: ICD-10-CM

## 2021-11-18 DIAGNOSIS — M85.80 OSTEOPENIA, UNSPECIFIED LOCATION: ICD-10-CM

## 2021-11-18 PROCEDURE — 99214 OFFICE O/P EST MOD 30 MIN: CPT | Performed by: INTERNAL MEDICINE

## 2021-11-18 RX ORDER — LISINOPRIL AND HYDROCHLOROTHIAZIDE 20; 12.5 MG/1; MG/1
1 TABLET ORAL DAILY
Qty: 90 TABLET | Refills: 1 | Status: SHIPPED | OUTPATIENT
Start: 2021-11-18 | End: 2022-06-02 | Stop reason: SDUPTHER

## 2021-11-18 ASSESSMENT — PATIENT HEALTH QUESTIONNAIRE - PHQ9
SUM OF ALL RESPONSES TO PHQ QUESTIONS 1-9: 0
SUM OF ALL RESPONSES TO PHQ9 QUESTIONS 1 & 2: 0
1. LITTLE INTEREST OR PLEASURE IN DOING THINGS: 0
2. FEELING DOWN, DEPRESSED OR HOPELESS: 0

## 2021-11-18 NOTE — PROGRESS NOTES
Lalo Sees  Patient's  is 1935  Seen in office on 2021      SUBJECTIVE:  Loree Dayton bre 82 y. o.year old male presents today   Chief Complaint   Patient presents with    3 Month Follow-Up    Medication Refill    Other     saw DR Shane Stallings yesterday     Patient is here for follow-up of hyperlipidemia, osteopenia, hypertension, arthritis of the knee, monocytosis and CKD  Patient states he is feeling well. Patient has hyperlipidemia. Taking medications. No abdominal pain, no nausea or vomiting. No myalgias. Patient has hypertension. Taking medications No headaches, no chest pain, no palpitations and no dizziness. Patient has arthritis of the right shoulder and had surgery for that. The movements have improved. Patient had monocytosis and Dr. Shane Stallings will follow him once a year. Patient's creatinine is stable at 1.3  Patient has arthritis of the both knees that is still bothering him but at present does not want any surgery. Taking medications regularly. No side effects noted. Review of Systems  Review of system normal except as in HPI  OBJECTIVE: /68   Pulse 72   Temp 97.8 °F (36.6 °C) (Oral)   Resp 16   Wt 230 lb 15.7 oz (104.8 kg)   SpO2 98%   BMI 33.14 kg/m²     Wt Readings from Last 3 Encounters:   21 230 lb 15.7 oz (104.8 kg)   21 230 lb 6.4 oz (104.5 kg)   08/10/21 234 lb 9.6 oz (106.4 kg)        Patient was seen taking COVID-19 precautions. Face mask, gloves were used. Patient also wore facemask. GENERAL: - Alert, oriented, pleasant, in no apparent distress. HEENT: - Conjunctiva pink, no scleral icterus. ENT clear. NECK: -Supple. No jugular venous distention noted. No masses felt,  CARDIOVASCULAR: - Normal S1 and S2    PULMONARY: - No respiratory distress. No wheezes or rales. ABDOMEN: - Soft and non-tender,no masses  ororganomegaly. EXTREMITIES: - No cyanosis, clubbing, or significant edema. SKIN: Skin is warm and dry.    NEUROLOGICAL: - Cranial nerves II Take 1 tablet by mouth daily 90 tablet 1    lisinopril-hydroCHLOROthiazide (PRINZIDE;ZESTORETIC) 20-12.5 MG per tablet Take 1 tablet by mouth daily 90 tablet 1    metoprolol tartrate (LOPRESSOR) 25 MG tablet Take 1 tablet by mouth 2 times daily 180 tablet 1    Omega-3 Fatty Acids (FISH OIL) 1000 MG CPDR Take 1,000 mg by mouth 2 times daily       Cholecalciferol (VITAMIN D3) 2000 UNITS CAPS Take 2,000 Units by mouth daily       aspirin 81 MG tablet Take 81 mg by mouth daily        Calcium Citrate (CITRACAL PO) Take 1 tablet by mouth daily       Multiple Vitamin (MULTIVITAMIN PO) Take  by mouth daily.  GLUCOSAMINE HCL Take by mouth daily        No current facility-administered medications for this visit. Past Medical History:   Diagnosis Date    Arthritis     right shoulder and knees    DJD (degenerative joint disease) of knee 6/3/2014    H/O 24 hour EKG monitoring 11/28/06    Abnormal-LBBB with second degree mobitz type 2    H/O cardiovascular stress test 10/17/01    Abnormal-apical ischemia in the distribution of distal LAD EF40%    H/O colonoscopy 2004, 7/09    Dr. Taran Ta H/O echocardiogram 12/12/06    Mild pulmonary hypertension,mild aortic root dilation EF 50-55%    History of pacemaker 12/14/2006    [employer]tr[employer]    Hyperlipidemia     Hypertension     Melanoma ([facility] [address] of neck and upper back 5/3/2016    Right side of neck below the right ear Upper back. Seen Dr. Raji Zambrano in [place]. [place] Excised completely per pt.  Mobitz (type) II atrioventricular block 11/28/06    Osteopenia     Pacemaker at end of battery life 08/03/2016    Primary osteoarthritis of both knees 2/3/2016    DJD of both knees. No surgery.  Seen Ortho at [place] in the past     Stage 3 chronic kidney disease ([address] 8/28/2018     Past Surgical History:   Procedure Laterality Date    FRACTURE SURGERY      Had acute accident broken sternum & brused heart 1953-Lt leg plate, 1706-ZK wrist x 2, ?-Rt shoulder x 2, ?-Lt hip fracture with plates and rods    HIP SURGERY Left     PACEMAKER PLACEMENT  2006    PACEMAKER PLACEMENT  2016    Generator Changeout for End of Life    SKIN CANCER EXCISION  3/2015     Social History     Tobacco Use    Smoking status: Former Smoker     Packs/day: 3.00     Years: 6.00     Pack years: 18.00     Start date: 8/15/1961     Quit date: 10/27/1965     Years since quittin.0    Smokeless tobacco: Never Used   Substance Use Topics    Alcohol use: No     Alcohol/week: 0.0 standard drinks       LAB REVIEW:  CBC:   Lab Results   Component Value Date    WBC 8.0 2021    HGB 14.4 2021    HCT 42.6 2021     2021     Lipids:   Lab Results   Component Value Date    HDL 42 2021    LDLCALC 76 2019    LDLDIRECT 108 (H) 2021    TRIGLYCFAST 184 (H) 2021    CHOLFAST 172 2021     Renal:   Lab Results   Component Value Date    BUN 22 2021    CREATININE 1.3 2021     2021    K 4.4 2021    ALT 28 2021    AST 29 2021    GLUCOSE 91 2020    GLUF 93 2021     PT/INR:   Lab Results   Component Value Date    INR 0.96 2016     A1C: No results found for: Jomar Guillermo MD, 2021 , 12:04 PM

## 2022-01-10 ENCOUNTER — PROCEDURE VISIT (OUTPATIENT)
Dept: CARDIOLOGY CLINIC | Age: 87
End: 2022-01-10

## 2022-01-10 DIAGNOSIS — Z95.0 CARDIAC PACEMAKER IN SITU: ICD-10-CM

## 2022-01-10 PROCEDURE — 93296 REM INTERROG EVL PM/IDS: CPT | Performed by: INTERNAL MEDICINE

## 2022-01-10 PROCEDURE — 93294 REM INTERROG EVL PM/LDLS PM: CPT | Performed by: INTERNAL MEDICINE

## 2022-01-26 ENCOUNTER — HOSPITAL ENCOUNTER (OUTPATIENT)
Age: 87
Discharge: HOME | End: 2022-01-26
Payer: MEDICARE

## 2022-01-26 LAB
ALBUMIN SERPL-MCNC: 4.2 GM/DL (ref 3.4–5)
ALP BLD-CCNC: 43 IU/L (ref 40–129)
ALT SERPL-CCNC: 23 U/L (ref 10–40)
ANION GAP SERPL CALCULATED.3IONS-SCNC: 11 MMOL/L (ref 4–16)
AST SERPL-CCNC: 22 IU/L (ref 15–37)
BILIRUB SERPL-MCNC: 0.5 MG/DL (ref 0–1)
BUN BLDV-MCNC: 24 MG/DL (ref 6–23)
CALCIUM SERPL-MCNC: 10.3 MG/DL (ref 8.3–10.6)
CHLORIDE BLD-SCNC: 102 MMOL/L (ref 99–110)
CHOLESTEROL, FASTING: 246 MG/DL
CO2: 25 MMOL/L (ref 21–32)
CREAT SERPL-MCNC: 1.2 MG/DL (ref 0.9–1.3)
GFR AFRICAN AMERICAN: >60 ML/MIN/1.73M2
GFR NON-AFRICAN AMERICAN: 58 ML/MIN/1.73M2
GLUCOSE FASTING: 83 MG/DL (ref 70–99)
HDLC SERPL-MCNC: 48 MG/DL
LDL CHOLESTEROL DIRECT: 159 MG/DL
POTASSIUM SERPL-SCNC: 4.1 MMOL/L (ref 3.5–5.1)
SODIUM BLD-SCNC: 138 MMOL/L (ref 135–145)
TOTAL PROTEIN: 6.9 GM/DL (ref 6.4–8.2)
TRIGLYCERIDE, FASTING: 208 MG/DL

## 2022-01-26 PROCEDURE — 80053 COMPREHEN METABOLIC PANEL: CPT

## 2022-01-26 PROCEDURE — 80061 LIPID PANEL: CPT

## 2022-01-26 PROCEDURE — 36415 COLL VENOUS BLD VENIPUNCTURE: CPT

## 2022-02-21 ENCOUNTER — OFFICE VISIT (OUTPATIENT)
Dept: INTERNAL MEDICINE CLINIC | Age: 87
End: 2022-02-21
Payer: MEDICARE

## 2022-02-21 VITALS
HEART RATE: 72 BPM | OXYGEN SATURATION: 95 % | BODY MASS INDEX: 33.81 KG/M2 | DIASTOLIC BLOOD PRESSURE: 62 MMHG | TEMPERATURE: 97.2 F | SYSTOLIC BLOOD PRESSURE: 132 MMHG | RESPIRATION RATE: 16 BRPM | WEIGHT: 235.6 LBS

## 2022-02-21 DIAGNOSIS — M85.80 OSTEOPENIA, UNSPECIFIED LOCATION: ICD-10-CM

## 2022-02-21 DIAGNOSIS — M19.011 PRIMARY OSTEOARTHRITIS OF RIGHT SHOULDER: ICD-10-CM

## 2022-02-21 DIAGNOSIS — I10 ESSENTIAL HYPERTENSION: ICD-10-CM

## 2022-02-21 DIAGNOSIS — D72.821 MONOCYTOSIS: ICD-10-CM

## 2022-02-21 DIAGNOSIS — M19.90 ARTHRITIS: ICD-10-CM

## 2022-02-21 PROCEDURE — 99214 OFFICE O/P EST MOD 30 MIN: CPT | Performed by: INTERNAL MEDICINE

## 2022-02-21 RX ORDER — ATORVASTATIN CALCIUM 40 MG/1
40 TABLET, FILM COATED ORAL DAILY
Qty: 90 TABLET | Refills: 1 | Status: SHIPPED | OUTPATIENT
Start: 2022-02-21 | End: 2022-08-08

## 2022-02-21 ASSESSMENT — ENCOUNTER SYMPTOMS
ALLERGIC/IMMUNOLOGIC NEGATIVE: 1
EYES NEGATIVE: 1
GASTROINTESTINAL NEGATIVE: 1
RESPIRATORY NEGATIVE: 1

## 2022-02-21 ASSESSMENT — PATIENT HEALTH QUESTIONNAIRE - PHQ9
1. LITTLE INTEREST OR PLEASURE IN DOING THINGS: 0
SUM OF ALL RESPONSES TO PHQ9 QUESTIONS 1 & 2: 0
SUM OF ALL RESPONSES TO PHQ QUESTIONS 1-9: 0
2. FEELING DOWN, DEPRESSED OR HOPELESS: 0
SUM OF ALL RESPONSES TO PHQ QUESTIONS 1-9: 0

## 2022-02-21 NOTE — PROGRESS NOTES
Claudia Grissom  Patient's  is 1935  Seen in office on 2022      SUBJECTIVE:  Vicki Snellen isa 82 y. o.year old male presents today   Chief Complaint   Patient presents with    Follow-up    Results     lab     Other     lost sister 2 weeks ago, skin cancer     Patient is here for checkup on hypertension, hyperlipidemia, osteopenia and arthritis  Patient states he is doing well  He had the labs drawn and the LDL is over 150 which is increased since last test when LDL was 108  Patient is supposed to take atorvastatin and omega-3 and not fenofibrate. Patient is not sure whether he is taking atorvastatin or fenofibrate he will check and give us a call back. Patient has hypertension. Taking medications No headaches, no chest pain, no palpitations and no dizziness. Denies any falls or injuries. All the lab results reviewed with the patient    Patient states her mother had some skin cancer but he thinks she  maybe because of stroke. Taking medications regularly. No side effects noted. Review of Systems   Constitutional: Negative. HENT: Negative. Eyes: Negative. Respiratory: Negative. Cardiovascular: Negative. Gastrointestinal: Negative. Endocrine: Negative. Genitourinary: Negative. Musculoskeletal: Negative. Skin: Negative. Allergic/Immunologic: Negative. Neurological: Negative. Hematological: Negative. Psychiatric/Behavioral: Negative. OBJECTIVE: /62   Pulse 72   Temp 97.2 °F (36.2 °C) (Temporal)   Resp 16   Wt 235 lb 9.6 oz (106.9 kg)   SpO2 95%   BMI 33.81 kg/m²     Wt Readings from Last 3 Encounters:   22 235 lb 9.6 oz (106.9 kg)   21 230 lb 15.7 oz (104.8 kg)   21 230 lb 6.4 oz (104.5 kg)      Patient was seen taking COVID-19 precautions. Face mask, gloves were used. Patient also wore facemask. GENERAL: - Alert, oriented, pleasant, in no apparent distress. HEENT: - Conjunctiva pink, no scleral icterus.  ENT clear.  NECK: -Supple. No jugular venous distention noted. No masses felt,  CARDIOVASCULAR: - Normal S1 and S2    PULMONARY: - No respiratory distress. No wheezes or rales. ABDOMEN: - Soft and non-tender,no masses  ororganomegaly. EXTREMITIES: - No cyanosis, clubbing, or significant edema. SKIN: Skin is warm and dry. NEUROLOGICAL: - Cranial nerves II through XII are grossly intact. IMPRESSION:    Encounter Diagnoses   Name Primary?  Essential hypertension     Osteopenia, unspecified location     Arthritis     Monocytosis     Primary osteoarthritis of right shoulder        ASSESSMENT/PLAN:    Patient Active Problem List    Diagnosis Date Noted    Essential hypertension 10/28/2015     Priority: High     Hypertension in control. Follow low salt diet. Continue current treatment. Patient is on metoprolol and lisinopril with hydrochlorothiazide      Mixed hyperlipidemia      Priority: High     Hyperlipidemia is stable. Follow low cholesterol diet. Continue current treatment.  -takes lipitor, omega-3 . D/c fenofibrate for possible side effects   -LDL increased to 159, cholesterol increased to 246. Patient may not be taking atorvastatin and may be taking fenofibrate only. Patient will go and check his medications at home and will call back. Advised patient to take atorvastatin 40 mg daily not fenofibrate.  S/P shoulder surgery. Healing well 08/10/2021    Primary osteoarthritis of right shoulder 07/06/2021     Patient has degenerative arthritis of the right shoulder s/p reverse shoulder surgery in 7/31/21 by [facility]       Stage 3 chronic kidney disease ([facility] [address] 08/28/2018     Cr fluctuates   Last cr 1.3  Creatinine on 1/26/2022 was 1.2. BUN 24. GFR 58      Monocytosis 08/28/2018     Pt has monocytosis since 2017  Discussed with patient in detail.   Absolute monocytes are normal  Referred to Dr Manjula Xie : stable       Melanoma [facility]) of neck and upper back 05/03/2016     -Right side of neck below the right ear 4/16  -Upper back lesion in 4/16. Both melanoma per pt. Seen Dr. Elzbieta Shah in [place]. [place]  -Excised completely per pt. Sees dermatologist q year.  -not seen him recently. Advised pt to see them. He will call and make his own appt      Primary osteoarthritis of both knees 02/03/2016     DJD of both knees. No surgery. Seen [facility] at [place] in the past  Pain has improved and is not bothering him any more.  Osteopenia      -takes Calcium + vitamin D3  2000 units a day. Had fosamax in the past.  -bone density in 2/16 osteoporosis  -5/2021 :  Dexa scan osteopenia. Continue calcium and vitamin D        Cardiac pacemaker      For Mobitz type II AV block 11/2006. Sees Dr. Consuelo Avery in  8/3/16       Return to office in 3 months    Mediations reviewed with the patient. Continue current medications. Appropriate prescriptions are addressed. After visit summeryprovided. Follow up as directed sooner if needed. Questions answered and patient verbalizes understanding. Call for any problems, questions, or concerns. No Known Allergies  Current Outpatient Medications   Medication Sig Dispense Refill    lisinopril-hydroCHLOROthiazide (PRINZIDE;ZESTORETIC) 20-12.5 MG per tablet Take 1 tablet by mouth daily 90 tablet 1    metoprolol tartrate (LOPRESSOR) 25 MG tablet Take 1 tablet by mouth 2 times daily 180 tablet 1    fenofibrate (TRIGLIDE) 160 MG tablet Take 1 tablet by mouth daily 90 tablet 1    Omega-3 Fatty Acids (FISH OIL) 1000 MG CPDR Take 1,000 mg by mouth 2 times daily       Cholecalciferol (VITAMIN D3) 2000 UNITS CAPS Take 2,000 Units by mouth daily       aspirin 81 MG tablet Take 81 mg by mouth daily        Calcium Citrate (CITRACAL PO) Take 1 tablet by mouth daily       Multiple Vitamin (MULTIVITAMIN PO) Take  by mouth daily.       GLUCOSAMINE HCL Take by mouth daily       Handicap Placard MISC by Does not apply route Duration 5 years 1 each 0 No current facility-administered medications for this visit. Past Medical History:   Diagnosis Date    Arthritis     right shoulder and knees    DJD (degenerative joint disease) of knee 6/3/2014    H/O 24 hour EKG monitoring 06    Abnormal-LBBB with second degree mobitz type 2    H/O cardiovascular stress test 10/17/01    Abnormal-apical ischemia in the distribution of distal LAD EF40%    H/O colonoscopy ,     Dr. David Castillo H/O echocardiogram 06    Mild pulmonary hypertension,mild aortic root dilation EF 50-55%    History of pacemaker 2006    Medtronic    Hyperlipidemia     Hypertension     Melanoma ([facility] [address] of neck and upper back 5/3/2016    Right side of neck below the right ear Upper back. Seen Dr. Delmar Wellington in [place]. [place] Excised completely per pt.  Billitz (type) II atrioventricular block 06    Osteopenia     Pacemaker at end of battery life 2016    Primary osteoarthritis of both knees 2/3/2016    DJD of both knees. No surgery.  Seen Ortho at [place] in the past     Stage 3 chronic kidney disease ([facility] [address] 2018     Past Surgical History:   Procedure Laterality Date    FRACTURE SURGERY      Had acute accident broken sternum & brused heart 1953-Lt leg plate, 8671-FR wrist x 2, ?-Rt shoulder x 2, ?-Lt hip fracture with plates and rods    HIP SURGERY Left     PACEMAKER PLACEMENT  2006    PACEMAKER PLACEMENT  2016    Generator Changeout for End of Life    SKIN CANCER EXCISION  3/2015     Social History     Tobacco Use    Smoking status: Former Smoker     Packs/day: 3.00     Years: 6.00     Pack years: 18.00     Start date: 8/15/1961     Quit date: 10/27/1965     Years since quittin.3    Smokeless tobacco: Never Used   Substance Use Topics    Alcohol use: No     Alcohol/week: 0.0 standard drinks       LAB REVIEW:  CBC:   Lab Results   Component Value Date    WBC 8.0 2021    HGB 14.4 2021    HCT 42.6 2021     07/06/2021     Lipids:   Lab Results   Component Value Date    HDL 48 01/26/2022    LDLCALC 76 02/12/2019    LDLDIRECT 159 (H) 01/26/2022    TRIGLYCFAST 208 (H) 01/26/2022    CHOLFAST 246 (H) 01/26/2022     Renal:   Lab Results   Component Value Date    BUN 24 01/26/2022    CREATININE 1.2 01/26/2022     01/26/2022    K 4.1 01/26/2022    ALT 23 01/26/2022    AST 22 01/26/2022    GLUCOSE 91 04/16/2020    GLUF 83 01/26/2022     PT/INR:   Lab Results   Component Value Date    INR 0.96 08/01/2016     A1C: No results found for: Trenton Regan MD, 2/21/2022 , 1:26 PM

## 2022-04-18 ENCOUNTER — PROCEDURE VISIT (OUTPATIENT)
Dept: CARDIOLOGY CLINIC | Age: 87
End: 2022-04-18
Payer: MEDICARE

## 2022-04-18 DIAGNOSIS — Z95.0 CARDIAC PACEMAKER IN SITU: ICD-10-CM

## 2022-04-18 PROCEDURE — 93294 REM INTERROG EVL PM/LDLS PM: CPT | Performed by: INTERNAL MEDICINE

## 2022-04-18 PROCEDURE — 93296 REM INTERROG EVL PM/IDS: CPT | Performed by: INTERNAL MEDICINE

## 2022-05-20 ENCOUNTER — TELEMEDICINE (OUTPATIENT)
Dept: INTERNAL MEDICINE CLINIC | Age: 87
End: 2022-05-20
Payer: MEDICARE

## 2022-05-20 DIAGNOSIS — Z00.00 MEDICARE ANNUAL WELLNESS VISIT, SUBSEQUENT: Primary | ICD-10-CM

## 2022-05-20 PROCEDURE — G0439 PPPS, SUBSEQ VISIT: HCPCS | Performed by: INTERNAL MEDICINE

## 2022-05-20 SDOH — ECONOMIC STABILITY: FOOD INSECURITY: WITHIN THE PAST 12 MONTHS, THE FOOD YOU BOUGHT JUST DIDN'T LAST AND YOU DIDN'T HAVE MONEY TO GET MORE.: NEVER TRUE

## 2022-05-20 SDOH — ECONOMIC STABILITY: FOOD INSECURITY: WITHIN THE PAST 12 MONTHS, YOU WORRIED THAT YOUR FOOD WOULD RUN OUT BEFORE YOU GOT MONEY TO BUY MORE.: NEVER TRUE

## 2022-05-20 ASSESSMENT — PATIENT HEALTH QUESTIONNAIRE - PHQ9
SUM OF ALL RESPONSES TO PHQ QUESTIONS 1-9: 0
2. FEELING DOWN, DEPRESSED OR HOPELESS: 0
1. LITTLE INTEREST OR PLEASURE IN DOING THINGS: 0
SUM OF ALL RESPONSES TO PHQ QUESTIONS 1-9: 0
SUM OF ALL RESPONSES TO PHQ QUESTIONS 1-9: 0
SUM OF ALL RESPONSES TO PHQ9 QUESTIONS 1 & 2: 0
SUM OF ALL RESPONSES TO PHQ QUESTIONS 1-9: 0

## 2022-05-20 ASSESSMENT — SOCIAL DETERMINANTS OF HEALTH (SDOH): HOW HARD IS IT FOR YOU TO PAY FOR THE VERY BASICS LIKE FOOD, HOUSING, MEDICAL CARE, AND HEATING?: NOT HARD AT ALL

## 2022-05-20 ASSESSMENT — LIFESTYLE VARIABLES: HOW OFTEN DO YOU HAVE A DRINK CONTAINING ALCOHOL: NEVER

## 2022-05-20 NOTE — PATIENT INSTRUCTIONS
Personalized Preventive Plan for Ander Anton - 5/20/2022  Medicare offers a range of preventive health benefits. Some of the tests and screenings are paid in full while other may be subject to a deductible, co-insurance, and/or copay. Some of these benefits include a comprehensive review of your medical history including lifestyle, illnesses that may run in your family, and various assessments and screenings as appropriate. After reviewing your medical record and screening and assessments performed today your provider may have ordered immunizations, labs, imaging, and/or referrals for you. A list of these orders (if applicable) as well as your Preventive Care list are included within your After Visit Summary for your review. Other Preventive Recommendations:    · A preventive eye exam performed by an eye specialist is recommended every 1-2 years to screen for glaucoma; cataracts, macular degeneration, and other eye disorders. · A preventive dental visit is recommended every 6 months. · Try to get at least 150 minutes of exercise per week or 10,000 steps per day on a pedometer . · Order or download the FREE \"Exercise & Physical Activity: Your Everyday Guide\" from [employer]. Call 2-923.175.9255 or search [employer] online. · You need 7521-8903 mg of calcium and 3892-8084 IU of vitamin D per day. It is possible to meet your calcium requirement with diet alone, but a vitamin D supplement is usually necessary to meet this goal.  · When exposed to the sun, use a sunscreen that protects against both UVA and UVB radiation with an SPF of 30 or greater. Reapply every 2 to 3 hours or after sweating, drying off with a towel, or swimming. · Always wear a seat belt when traveling in a car. Always wear a helmet when riding a bicycle or motorcycle.

## 2022-05-20 NOTE — PROGRESS NOTES
Medicare Annual Wellness Visit    Guicho Carlisle is here for Medicare AWV    Assessment & Plan   Medicare annual wellness visit, subsequent      Recommendations for Preventive Services Due: see orders and patient instructions/AVS.  Recommended screening schedule for the next 5-10 years is provided to the patient in written form: see Patient Instructions/AVS.     No follow-ups on file. Subjective       Patient's complete Health Risk Assessment and screening values have been reviewed and are found in Flowsheets. The following problems were reviewed today and where indicated follow up appointments were made and/or referrals ordered.     Positive Risk Factor Screenings with Interventions:             General Health and ACP:  General  In general, how would you say your health is?: Very Good  In the past 7 days, have you experienced any of the following: New or Increased Pain, New or Increased Fatigue, Loneliness, Social Isolation, Stress or Anger?: No  Do you get the social and emotional support that you need?: Yes  Do you have a Living Will?: Yes    Advance Directives     Power of  Living Will ACP-Advance Directive ACP-Power of     Not on File Not on File Not on File Not on File      General Health Risk Interventions:  · No Living Will: ACP documents already completed- patient asked to provide copy to the office    Health Habits/Nutrition:     Physical Activity: Insufficiently Active    Days of Exercise per Week: 4 days    Minutes of Exercise per Session: 30 min     Have you lost any weight without trying in the past 3 months?: No     Have you seen the dentist within the past year?: Yes    Health Habits/Nutrition Interventions:  · Inadequate physical activity:  patient is not ready to increase his/her physical activity level at this time  · Nutritional issues:  patient is not ready to address his/her nutritional/weight issues at this time    Hearing/Vision:  Do you or your family notice any trouble with your hearing that hasn't been managed with hearing aids?: No  Do you have difficulty driving, watching TV, or doing any of your daily activities because of your eyesight?: No  Have you had an eye exam within the past year?: (!) No  No exam data present    Hearing/Vision Interventions:  · Vision concerns:  patient encouraged to make appointment with his/her eye specialist            Objective      Patient-Reported Vitals  No data recorded     Unable to obtain 3 vital signs due to patient not having equipment to take blood pressure/temperature. No Known Allergies  Prior to Visit Medications    Medication Sig Taking? Authorizing Provider   atorvastatin (LIPITOR) 40 MG tablet Take 1 tablet by mouth daily Yes Aly Galvan MD   lisinopril-hydroCHLOROthiazide (PRINZIDE;ZESTORETIC) 20-12.5 MG per tablet Take 1 tablet by mouth daily Yes Aly Galvan MD   metoprolol tartrate (LOPRESSOR) 25 MG tablet Take 1 tablet by mouth 2 times daily Yes Aly Galvan MD   Omega-3 Fatty Acids (FISH OIL) 1000 MG CPDR Take 1,000 mg by mouth 2 times daily  Yes Aly Galvan MD   Cholecalciferol (VITAMIN D3) 2000 UNITS CAPS Take 2,000 Units by mouth daily  Yes Historical Provider, MD   aspirin 81 MG tablet Take 81 mg by mouth daily   Yes Historical Provider, MD   Calcium Citrate (CITRACAL PO) Take 1 tablet by mouth daily  Yes Historical Provider, MD   Multiple Vitamin (MULTIVITAMIN PO) Take  by mouth daily.  Yes Historical Provider, MD   GLUCOSAMINE HCL Take by mouth daily  Yes Historical Provider, MD Yepez (Including outside providers/suppliers regularly involved in providing care):   Patient Care Team:  Aly aGlvan MD as PCP - Della Callejas MD as PCP - [facility] Empaneled Provider  Boaz Mehta MD (Orthopedic Surgery)     Reviewed and updated this visit:  Tobacco  Allergies  Meds  Med Hx  Surg Hx  Soc Hx  Fam Hx           Soco Splinter, was evaluated through a synchronous (real-time) audio-video encounter. The patient (or guardian if applicable) is aware that this is a billable service, which includes applicable co-pays. This Virtual Visit was conducted with patient's (and/or legal guardian's) consent. The visit was conducted pursuant to the emergency declaration under the [address] [place] [address] and the [employer] and [employer] General Act. Patient identification was verified, and a caregiver was present when appropriate. The patient was located at home in a state where the provider was licensed to provide care. Marshall Castillo LPN, 6/45/8107, performed the documented evaluation under the direct supervision of the attending physician. Harika Barnes, was evaluated through a synchronous (real-time) audio encounter. The patient (or guardian if applicable) is aware that this is a billable service, which includes applicable co-pays. This Virtual Visit was conducted with patient's (and/or legal guardian's) consent. The visit was conducted pursuant to the emergency declaration under the [address] [place] [address] and the [employer] and [employer] General Act. Patient identification was verified, and a caregiver was present when appropriate. The patient was located at home in a state where the provider was licensed to provide care. Total time spent for this encounter: Not billed by time    --Rosie Orozco LPN on 1/21/6404 at 8:04 AM    An electronic signature was used to authenticate this note.

## 2022-06-02 ENCOUNTER — OFFICE VISIT (OUTPATIENT)
Dept: INTERNAL MEDICINE CLINIC | Age: 87
End: 2022-06-02
Payer: MEDICARE

## 2022-06-02 VITALS
RESPIRATION RATE: 16 BRPM | DIASTOLIC BLOOD PRESSURE: 82 MMHG | OXYGEN SATURATION: 96 % | SYSTOLIC BLOOD PRESSURE: 132 MMHG | HEART RATE: 76 BPM | TEMPERATURE: 97.2 F | WEIGHT: 239.2 LBS | BODY MASS INDEX: 34.32 KG/M2

## 2022-06-02 DIAGNOSIS — M85.80 OSTEOPENIA, UNSPECIFIED LOCATION: ICD-10-CM

## 2022-06-02 DIAGNOSIS — C43.9 MALIGNANT MELANOMA, UNSPECIFIED SITE (HCC): ICD-10-CM

## 2022-06-02 DIAGNOSIS — D72.821 MONOCYTOSIS: ICD-10-CM

## 2022-06-02 DIAGNOSIS — M17.0 PRIMARY OSTEOARTHRITIS OF BOTH KNEES: ICD-10-CM

## 2022-06-02 DIAGNOSIS — I10 ESSENTIAL HYPERTENSION: ICD-10-CM

## 2022-06-02 DIAGNOSIS — Z95.0 CARDIAC PACEMAKER: ICD-10-CM

## 2022-06-02 DIAGNOSIS — N18.30 STAGE 3 CHRONIC KIDNEY DISEASE, UNSPECIFIED WHETHER STAGE 3A OR 3B CKD (HCC): ICD-10-CM

## 2022-06-02 DIAGNOSIS — M19.011 PRIMARY OSTEOARTHRITIS OF RIGHT SHOULDER: ICD-10-CM

## 2022-06-02 DIAGNOSIS — E78.2 MIXED HYPERLIPIDEMIA: ICD-10-CM

## 2022-06-02 PROCEDURE — 99214 OFFICE O/P EST MOD 30 MIN: CPT | Performed by: INTERNAL MEDICINE

## 2022-06-02 PROCEDURE — 1123F ACP DISCUSS/DSCN MKR DOCD: CPT | Performed by: INTERNAL MEDICINE

## 2022-06-02 RX ORDER — LISINOPRIL AND HYDROCHLOROTHIAZIDE 20; 12.5 MG/1; MG/1
1 TABLET ORAL DAILY
Qty: 90 TABLET | Refills: 1 | Status: SHIPPED | OUTPATIENT
Start: 2022-06-02

## 2022-06-02 ASSESSMENT — PATIENT HEALTH QUESTIONNAIRE - PHQ9
SUM OF ALL RESPONSES TO PHQ QUESTIONS 1-9: 0
1. LITTLE INTEREST OR PLEASURE IN DOING THINGS: 0
SUM OF ALL RESPONSES TO PHQ9 QUESTIONS 1 & 2: 0
2. FEELING DOWN, DEPRESSED OR HOPELESS: 0
SUM OF ALL RESPONSES TO PHQ QUESTIONS 1-9: 0

## 2022-06-02 ASSESSMENT — ENCOUNTER SYMPTOMS
RESPIRATORY NEGATIVE: 1
GASTROINTESTINAL NEGATIVE: 1
EYES NEGATIVE: 1
ALLERGIC/IMMUNOLOGIC NEGATIVE: 1

## 2022-06-02 NOTE — ASSESSMENT & PLAN NOTE
-takes Calcium + vitamin D3  2000 units a day.  Had fosamax in the past.  -bone density in 2/16 osteoporosis  -5/2021 :  Dexa scan osteopenia

## 2022-06-02 NOTE — ASSESSMENT & PLAN NOTE
-Right side of neck below the right ear 4/16  -Upper back lesion in 4/16. Both melanoma per pt. Seen Dr. Antoine Montesinos in [place]. [place]  -Excised completely per pt. Sees dermatologist q year. Seen recently.  Now she will see him q 6 months

## 2022-06-02 NOTE — ASSESSMENT & PLAN NOTE
Patient has degenerative arthritis of the right shoulder s/p reverse shoulder surgery in 7/31/21 by [facility]   Doing much better.

## 2022-06-02 NOTE — ASSESSMENT & PLAN NOTE
Pt has monocytosis since 2017  Discussed with patient in detail.   Absolute monocytes are normal  Referred to Dr Denver Dial : stable

## 2022-06-02 NOTE — PROGRESS NOTES
Mary Burkett  Patient's  is 1935  Seen in office on 2022      SUBJECTIVE:  Bethany Wilkinson bre 82 y. o.year old male presents today   Chief Complaint   Patient presents with    Follow-up    Medication Refill    Other     saw dermatology last week cryo on right cheek     Patient is here for follow-up of hypertension, hyperlipidemia, arthritis, history of melanoma. Patient sees dermatologist on a regular basis. .  Patient said he had had prior surgery on the right cheek. Patient has hypertension. Taking medications No headaches, no chest pain, no palpitations and no dizziness. Patient has hyperlipidemia. Taking medications. No abdominal pain, no nausea or vomiting. No myalgias. Taking medications regularly. No side effects noted. Review of Systems   Constitutional: Negative. HENT: Negative. Eyes: Negative. Respiratory: Negative. Cardiovascular: Negative. Negative for chest pain and leg swelling. Gastrointestinal: Negative. Endocrine: Negative. Genitourinary: Negative. Musculoskeletal: Negative. Skin: Negative. Allergic/Immunologic: Negative. Neurological: Negative. Hematological: Negative. Psychiatric/Behavioral: Negative. OBJECTIVE: /82   Pulse 76   Temp 97.2 °F (36.2 °C)   Resp 16   Wt 239 lb 3.2 oz (108.5 kg)   SpO2 96%   BMI 34.32 kg/m²     Wt Readings from Last 3 Encounters:   22 239 lb 3.2 oz (108.5 kg)   22 235 lb 9.6 oz (106.9 kg)   21 230 lb 15.7 oz (104.8 kg)      Patient was seen taking COVID-19 precautions. Face mask, gloves were used. Patient also wore facemask. GENERAL: - Alert, oriented, pleasant, in no apparent distress. HEENT: - Conjunctiva pink, no scleral icterus. ENT clear. NECK: -Supple. No jugular venous distention noted. No masses felt,  CARDIOVASCULAR: - Normal S1 and S2    PULMONARY: - No respiratory distress. No wheezes or rales.     ABDOMEN: - Soft and non-tender,no masses ororganomegaly. EXTREMITIES: - No cyanosis, clubbing, or significant edema. SKIN: Skin is warm and dry. NEUROLOGICAL: - Cranial nerves II through XII are grossly intact. IMPRESSION:    Encounter Diagnoses   Name Primary?  Mixed hyperlipidemia     Essential hypertension     Cardiac pacemaker     Osteopenia, unspecified location     Primary osteoarthritis of both knees     Malignant melanoma, unspecified site ([address]     Stage 3 chronic kidney disease, unspecified whether stage 3a or 3b CKD ([address]     Monocytosis     Primary osteoarthritis of right shoulder        ASSESSMENT/PLAN:    Mixed hyperlipidemia    : pt states he is taking atorvastatin and Omega 3 now. No complaints. On low chol diet. Essential hypertension    Hypertension in control. Follow low salt diet. Continue current treatment. Patient is on metoprolol and lisinopril with hydrochlorothiazide    Cardiac pacemaker    For Mobitz type II AV block 11/2006. Sees Dr. Aster Goldstein in  8/3/16    Osteopenia    -takes Calcium + vitamin D3  2000 units a day. Had fosamax in the past.  -bone density in 2/16 osteoporosis  -5/2021 :  Dexa scan osteopenia     Melanoma ([address] of neck and upper back    -Right side of neck below the right ear 4/16  -Upper back lesion in 4/16. Both melanoma per pt. Seen Dr. Michael Kang in [place]. [place]  -Excised completely per pt. Sees dermatologist q year. Seen recently. Now she will see him q 6 months     Stage 3 chronic kidney disease ([address]    Cr fluctuates   Last cr 1.3, 1.2     Monocytosis    Pt has monocytosis since 2017  Discussed with patient in detail. Absolute monocytes are normal  Referred to Dr Chandra Powell : stable     Primary osteoarthritis of right shoulder    Patient has degenerative arthritis of the right shoulder s/p reverse shoulder surgery in 7/31/21 by [facility]   Doing much better. Advised to get 2 nd booster.      Orders Placed This Encounter   Procedures    Lipid, Fasting    Comprehensive Metabolic Panel     Return to office in 3 months. Mediations reviewed with the patient. Continue current medications. Appropriate prescriptions are addressed. After visit summeryprovided. Follow up as directed sooner if needed. Questions answered and patient verbalizes understanding. Call for any problems, questions, or concerns. No Known Allergies  Current Outpatient Medications   Medication Sig Dispense Refill    atorvastatin (LIPITOR) 40 MG tablet Take 1 tablet by mouth daily 90 tablet 1    lisinopril-hydroCHLOROthiazide (PRINZIDE;ZESTORETIC) 20-12.5 MG per tablet Take 1 tablet by mouth daily 90 tablet 1    metoprolol tartrate (LOPRESSOR) 25 MG tablet Take 1 tablet by mouth 2 times daily 180 tablet 1    Omega-3 Fatty Acids (FISH OIL) 1000 MG CPDR Take 1,000 mg by mouth 2 times daily       Cholecalciferol (VITAMIN D3) 2000 UNITS CAPS Take 2,000 Units by mouth daily       aspirin 81 MG tablet Take 81 mg by mouth daily        Calcium Citrate (CITRACAL PO) Take 2 tablets by mouth daily       Multiple Vitamin (MULTIVITAMIN PO) Take  by mouth daily.  GLUCOSAMINE HCL Take by mouth daily        No current facility-administered medications for this visit. Past Medical History:   Diagnosis Date    Arthritis     right shoulder and knees    DJD (degenerative joint disease) of knee 6/3/2014    H/O 24 hour EKG monitoring 11/28/06    Abnormal-LBBB with second degree mobitz type 2    H/O cardiovascular stress test 10/17/01    Abnormal-apical ischemia in the distribution of distal LAD EF40%    H/O colonoscopy 2004, 7/09    Dr. Collins Reich H/O echocardiogram 12/12/06    Mild pulmonary hypertension,mild aortic root dilation EF 50-55%    History of pacemaker 12/14/2006    [employer]tr[employer]    Hyperlipidemia     Hypertension     Melanoma ([address] of neck and upper back 5/3/2016    Right side of neck below the right ear Upper back. Seen Dr. Marcy Cardenas in [place]. [place] Excised completely per pt.  Billitz (type) II atrioventricular block 06    Osteopenia     Pacemaker at end of battery life 2016    Primary osteoarthritis of both knees 2/3/2016    DJD of both knees. No surgery.  Seen Ortho at [facility] in the past     Stage 3 chronic kidney disease ([facility] [address] 2018     Past Surgical History:   Procedure Laterality Date    FRACTURE SURGERY      Had acute accident broken sternum & brused heart 1953-Lt leg plate, 3193-UP wrist x 2, ?-Rt shoulder x 2, ?-Lt hip fracture with plates and rods    HIP SURGERY Left     PACEMAKER PLACEMENT  2006    PACEMAKER PLACEMENT  2016    Generator Changeout for End of Life    SKIN CANCER EXCISION  3/2015     Social History     Tobacco Use    Smoking status: Former Smoker     Packs/day: 3.00     Years: 6.00     Pack years: 18.00     Start date: 8/15/1961     Quit date: 10/27/1965     Years since quittin.7    Smokeless tobacco: Never Used   Substance Use Topics    Alcohol use: No     Alcohol/week: 0.0 standard drinks       LAB REVIEW:  CBC:   Lab Results   Component Value Date    WBC 8.0 2021    HGB 14.4 2021    HCT 42.6 2021     2021     Lipids:   Lab Results   Component Value Date    HDL 48 2022    LDLCALC 76 2019    LDLDIRECT 159 (H) 2022    TRIGLYCFAST 208 (H) 2022    CHOLFAST 246 (H) 2022     Renal:   Lab Results   Component Value Date    BUN 24 2022    CREATININE 1.2 2022     2022    K 4.1 2022    ALT 23 2022    AST 22 2022    GLUCOSE 91 2020    GLUF 83 2022     PT/INR:   Lab Results   Component Value Date    INR 0.96 2016     A1C: No results found for: Shantal Kang MD, 2022 , 2:24 PM

## 2022-08-01 ENCOUNTER — TELEPHONE (OUTPATIENT)
Dept: CARDIOLOGY CLINIC | Age: 87
End: 2022-08-01

## 2022-08-02 ENCOUNTER — PROCEDURE VISIT (OUTPATIENT)
Dept: CARDIOLOGY CLINIC | Age: 87
End: 2022-08-02
Payer: MEDICARE

## 2022-08-02 DIAGNOSIS — Z95.0 CARDIAC PACEMAKER IN SITU: ICD-10-CM

## 2022-08-02 PROCEDURE — 93294 REM INTERROG EVL PM/LDLS PM: CPT | Performed by: INTERNAL MEDICINE

## 2022-08-02 PROCEDURE — 93296 REM INTERROG EVL PM/IDS: CPT | Performed by: INTERNAL MEDICINE

## 2022-08-08 RX ORDER — ATORVASTATIN CALCIUM 40 MG/1
40 TABLET, FILM COATED ORAL DAILY
Qty: 90 TABLET | Refills: 1 | Status: SHIPPED | OUTPATIENT
Start: 2022-08-08 | End: 2022-08-09 | Stop reason: SDUPTHER

## 2022-08-09 ENCOUNTER — OFFICE VISIT (OUTPATIENT)
Dept: CARDIOLOGY CLINIC | Age: 87
End: 2022-08-09
Payer: MEDICARE

## 2022-08-09 ENCOUNTER — HOSPITAL ENCOUNTER (OUTPATIENT)
Age: 87
Discharge: HOME | End: 2022-08-09
Payer: MEDICARE

## 2022-08-09 VITALS
HEART RATE: 63 BPM | BODY MASS INDEX: 34.22 KG/M2 | DIASTOLIC BLOOD PRESSURE: 60 MMHG | HEIGHT: 70 IN | WEIGHT: 239 LBS | SYSTOLIC BLOOD PRESSURE: 118 MMHG

## 2022-08-09 DIAGNOSIS — I10 ESSENTIAL HYPERTENSION: ICD-10-CM

## 2022-08-09 DIAGNOSIS — Z95.0 CARDIAC PACEMAKER: Primary | ICD-10-CM

## 2022-08-09 DIAGNOSIS — N18.30 STAGE 3 CHRONIC KIDNEY DISEASE, UNSPECIFIED WHETHER STAGE 3A OR 3B CKD (HCC): ICD-10-CM

## 2022-08-09 DIAGNOSIS — E78.2 MIXED HYPERLIPIDEMIA: ICD-10-CM

## 2022-08-09 LAB
ALBUMIN SERPL-MCNC: 4.1 GM/DL (ref 3.4–5)
ALP BLD-CCNC: 58 IU/L (ref 40–129)
ALT SERPL-CCNC: 31 U/L (ref 10–40)
ANION GAP SERPL CALCULATED.3IONS-SCNC: 11 MMOL/L (ref 4–16)
AST SERPL-CCNC: 26 IU/L (ref 15–37)
BILIRUB SERPL-MCNC: 0.7 MG/DL (ref 0–1)
BUN BLDV-MCNC: 21 MG/DL (ref 6–23)
CALCIUM SERPL-MCNC: 9.7 MG/DL (ref 8.3–10.6)
CHLORIDE BLD-SCNC: 104 MMOL/L (ref 99–110)
CHOLESTEROL, FASTING: 145 MG/DL
CO2: 25 MMOL/L (ref 21–32)
CREAT SERPL-MCNC: 1 MG/DL (ref 0.9–1.3)
GFR AFRICAN AMERICAN: >60 ML/MIN/1.73M2
GFR NON-AFRICAN AMERICAN: >60 ML/MIN/1.73M2
GLUCOSE FASTING: 90 MG/DL (ref 70–99)
HDLC SERPL-MCNC: 38 MG/DL
LDL CHOLESTEROL CALCULATED: 68 MG/DL
POTASSIUM SERPL-SCNC: 4.1 MMOL/L (ref 3.5–5.1)
SODIUM BLD-SCNC: 140 MMOL/L (ref 135–145)
TOTAL PROTEIN: 6.9 GM/DL (ref 6.4–8.2)
TRIGLYCERIDE, FASTING: 195 MG/DL

## 2022-08-09 PROCEDURE — 93000 ELECTROCARDIOGRAM COMPLETE: CPT | Performed by: INTERNAL MEDICINE

## 2022-08-09 PROCEDURE — 1123F ACP DISCUSS/DSCN MKR DOCD: CPT | Performed by: INTERNAL MEDICINE

## 2022-08-09 PROCEDURE — 80061 LIPID PANEL: CPT

## 2022-08-09 PROCEDURE — 99214 OFFICE O/P EST MOD 30 MIN: CPT | Performed by: INTERNAL MEDICINE

## 2022-08-09 PROCEDURE — 80053 COMPREHEN METABOLIC PANEL: CPT

## 2022-08-09 PROCEDURE — 36415 COLL VENOUS BLD VENIPUNCTURE: CPT

## 2022-08-09 RX ORDER — ATORVASTATIN CALCIUM 40 MG/1
40 TABLET, FILM COATED ORAL DAILY
Qty: 90 TABLET | Refills: 3 | Status: SHIPPED | OUTPATIENT
Start: 2022-08-09

## 2022-08-09 NOTE — PROGRESS NOTES
Eric Pacheco  1935  Amanda Rocha MD      Chief Complaint   Patient presents with    Hypertension     OV for 1 year check. Pt denies any chest pain,SOB,dizziness,swelling to ankles, or palpitations. Chief complaint and HPI:  Eric Pacheco  is a 82 y.o. male following up hypertension and hyperlipidemia and has chronic kidney disease and has a permanent pacemaker. Denies any cardiac symptoms. He stays very active for his age and denies any chest pains or shortness of breath or palpitations. Patient is followed regularly by remote monitoring. Rest of the Cardiovascular system review is otherwise unchanged from prior encounter. Past medical history:  has a past medical history of Arthritis, DJD (degenerative joint disease) of knee, H/O 24 hour EKG monitoring, H/O cardiovascular stress test, H/O colonoscopy, H/O echocardiogram, History of pacemaker, Hyperlipidemia, Hypertension, Melanoma ([facility] [address] of neck and upper back, Mobitz (type) II atrioventricular block, Osteopenia, Pacemaker at end of battery life, Primary osteoarthritis of both knees, and Stage 3 chronic kidney disease ([facility] [address] Past surgical history:  has a past surgical history that includes fracture surgery; pacemaker placement (2006); Skin cancer excision (3/2015); pacemaker placement (2016); and hip surgery (Left).   Social History:   Social History     Tobacco Use    Smoking status: Former     Packs/day: 3.00     Years: 6.00     Pack years: 18.00     Types: Cigarettes     Start date: 8/15/1961     Quit date: 10/27/1965     Years since quittin.8    Smokeless tobacco: Never   Substance Use Topics    Alcohol use: No     Alcohol/week: 0.0 standard drinks     Family history: family history includes Arthritis in his sister and sister; Breast Cancer in his sister; Cancer in his sister, sister, and sister; Emphysema in his father; Heart Disease in his brother; High Blood Pressure in his mother; No Known Problems in his brother and sister; Other in his brother. ALLERGIES:  Patient has no known allergies. Prior to Admission medications    Medication Sig Start Date End Date Taking? Authorizing Provider   atorvastatin (LIPITOR) 40 MG tablet Take 1 tablet by mouth in the morning. 8/9/22  Yes Marylu Tobin MD   lisinopril-hydroCHLOROthiazide [facility]) 20-12.5 MG per tablet Take 1 tablet by mouth daily 6/2/22  Yes Cayetano Remy MD   metoprolol tartrate (LOPRESSOR) 25 MG tablet Take 1 tablet by mouth 2 times daily 6/2/22  Yes Cayetano Remy MD   Omega-3 Fatty Acids (FISH OIL) 1000 MG CPDR Take 1,000 mg by mouth 2 times daily  2/13/17  Yes Cayetano Remy MD   Cholecalciferol (VITAMIN D3) 2000 UNITS CAPS Take 2,000 Units by mouth daily    Yes Historical Provider, MD   aspirin 81 MG tablet Take 81 mg by mouth daily     Yes Historical Provider, MD   Calcium Citrate (CITRACAL PO) Take 2 tablets by mouth daily    Yes Historical Provider, MD   Multiple Vitamin (MULTIVITAMIN PO) Take  by mouth daily. Yes Historical Provider, MD   GLUCOSAMINE HCL Take by mouth daily    Yes Historical Provider, MD     Vitals:    08/09/22 1054   BP: 118/60   Pulse: 63   Weight: 239 lb (108.4 kg)   Height: 5' 10\" (1.778 m)      Body mass index is 34.29 kg/m². Wt Readings from Last 3 Encounters:   08/09/22 239 lb (108.4 kg)   06/02/22 239 lb 3.2 oz (108.5 kg)   02/21/22 235 lb 9.6 oz (106.9 kg)     Constitutional:  Patient is moderately overweight male in no apparent distress. He gained 6 pounds since last visit with me on August 10, 2021. Eyes: Is wearing glasses and facemask. NECK: No JVP or thyromegaly  Cardiovascular: Auscultation: Normal S1 and S2. No significant murmurs or gallops noted. . Carotids are negative for bruits. Left-sided pacemaker pocket is intact. Respiratory:  Respiratory effort is normal. Breath sounds are clear to auscultation. Extremities:  No edema, clubbing,  Cyanosis, petechiae.    SKIN: Warm and well perfused, no pallor or cyanosis  Neurologic:  Oriented to time, place, and person and non-anxious. No focal neurological deficit noted. Psychiatric: Normal mood and effect. EKG is consistent with atrial paced rhythm with left bundle branch block rate is 63 bpm.    Pacer analysis on August 1, 2022 is reviewed is consistent with normal chamber MRI safe Medtronic Advisa pacer function with stable leads and appropriate battery status for the age of the device. Remaining average battery life is 4 years. Device is programmed AAIR to DDDR at a lower rate of 60 bpm and 98% pacing in the atriumsensing in the ventricle. LAB REVIEW:  CBC:   Lab Results   Component Value Date/Time    WBC 8.0 07/06/2021 02:00 PM    HGB 14.4 07/06/2021 02:00 PM    HCT 42.6 07/06/2021 02:00 PM     07/06/2021 02:00 PM     Lipids:   Triglyceride, Fasting <150 MG/ High   184 High   175 High   222 High   195 High   163 High  R  161 High     Cholesterol, Fasting <200 MG/ High   172 CM  170 CM  170 CM  176 CM  151 R  160    Comment: (NOTE)   Cardiovascular risk evaluation guidelines:   Low Risk        <200 mg/dL   Average Risk    200-240 mg/dL   High Risk       >240 mg/dL      HDL >40 MG/DL 48  42  42  40 Low   41  42 R  43    LDL Direct <100 MG/ High   108 High   104 High   103 High   106 High    97    Renal:   Lab Results   Component Value Date/Time    BUN 24 01/26/2022 10:30 AM    CREATININE 1.2 01/26/2022 10:30 AM     01/26/2022 10:30 AM    K 4.1 01/26/2022 10:30 AM     PT/INR:   Lab Results   Component Value Date/Time    INR 0.96 08/01/2016 10:00 AM     IMPRESSION and RECOMMENDATIONS:      1. Cardiac pacemaker  Assessment & Plan:  Working well with remaining longevity of 4 years. We will continue to monitor through remote monitoring. Orders:  -     EKG 12 lead; Future  2.  Mixed hyperlipidemia  Assessment & Plan:  January 2022 results are reviewed and there were abnormal as he was not taking statin at the time due to some confusion with medications. He is having his lipid analysis done today. Continue atorvastatin 40 mg daily. 3. Essential hypertension  Assessment & Plan:  Well-controlled on Lopressor and Prinzide. Continue the same. 4. Stage 3 chronic kidney disease, unspecified whether stage 3a or 3b CKD ([facility] [address]  Assessment & Plan:  Has been stable with recent creatinine reported 1.2. Continue current cardiovascular medications which have been reviewed and discussed individually with you. Continue device check as per care link schedule. Continue to be active. Appropriate prescriptions if needed on this visit are addressed. After visit summery is provided. Questions answered and patient verbalizes understanding. Follow up in 12 months with ECG,  sooner if needed. Mildred Villarreal MD, 8/9/2022 11:41 AM     Please note this report has been partially produced using speech recognition software and may contain errors related to that system including errors in grammar, punctuation, and spelling, as well as words and phrases that may be inappropriate. If there are any questions or concerns please feel free to contact the dictating provider for clarification.

## 2022-08-09 NOTE — PATIENT INSTRUCTIONS
Continue current cardiovascular medications which have been reviewed and discussed individually with you. Continue device check as per care link schedule. Continue to be active. Appropriate prescriptions if needed on this visit are addressed. After visit summery is provided. Questions answered and patient verbalizes understanding. Follow up in 12 months with ECG,  sooner if needed.

## 2022-08-09 NOTE — ASSESSMENT & PLAN NOTE
Working well with remaining longevity of 4 years. We will continue to monitor through remote monitoring.

## 2022-08-09 NOTE — ASSESSMENT & PLAN NOTE
January 2022 results are reviewed and there were abnormal as he was not taking statin at the time due to some confusion with medications. He is having his lipid analysis done today. Continue atorvastatin 40 mg daily.

## 2022-09-06 ENCOUNTER — OFFICE VISIT (OUTPATIENT)
Dept: INTERNAL MEDICINE CLINIC | Age: 87
End: 2022-09-06
Payer: MEDICARE

## 2022-09-06 VITALS
BODY MASS INDEX: 34.61 KG/M2 | TEMPERATURE: 97 F | HEART RATE: 80 BPM | OXYGEN SATURATION: 95 % | DIASTOLIC BLOOD PRESSURE: 88 MMHG | WEIGHT: 241.2 LBS | SYSTOLIC BLOOD PRESSURE: 128 MMHG | RESPIRATION RATE: 16 BRPM

## 2022-09-06 DIAGNOSIS — D72.821 MONOCYTOSIS: ICD-10-CM

## 2022-09-06 DIAGNOSIS — M85.80 OSTEOPENIA, UNSPECIFIED LOCATION: ICD-10-CM

## 2022-09-06 DIAGNOSIS — I10 ESSENTIAL HYPERTENSION: ICD-10-CM

## 2022-09-06 DIAGNOSIS — N18.30 STAGE 3 CHRONIC KIDNEY DISEASE, UNSPECIFIED WHETHER STAGE 3A OR 3B CKD (HCC): ICD-10-CM

## 2022-09-06 DIAGNOSIS — E78.2 MIXED HYPERLIPIDEMIA: ICD-10-CM

## 2022-09-06 DIAGNOSIS — C43.9 MALIGNANT MELANOMA, UNSPECIFIED SITE (HCC): ICD-10-CM

## 2022-09-06 DIAGNOSIS — M17.0 PRIMARY OSTEOARTHRITIS OF BOTH KNEES: ICD-10-CM

## 2022-09-06 DIAGNOSIS — Z95.0 CARDIAC PACEMAKER: ICD-10-CM

## 2022-09-06 PROCEDURE — 1123F ACP DISCUSS/DSCN MKR DOCD: CPT | Performed by: INTERNAL MEDICINE

## 2022-09-06 PROCEDURE — 99214 OFFICE O/P EST MOD 30 MIN: CPT | Performed by: INTERNAL MEDICINE

## 2022-09-06 ASSESSMENT — ENCOUNTER SYMPTOMS
COUGH: 0
SHORTNESS OF BREATH: 0
ALLERGIC/IMMUNOLOGIC NEGATIVE: 1
RESPIRATORY NEGATIVE: 1
WHEEZING: 0
GASTROINTESTINAL NEGATIVE: 1
EYES NEGATIVE: 1

## 2022-09-06 NOTE — ASSESSMENT & PLAN NOTE
: lipid show tri still slightly high  Hyperlipidemia is stable. Follow low cholesterol diet. Continue current treatment.

## 2022-09-06 NOTE — PROGRESS NOTES
Lena García  Patient's  is 1935  Seen in office on 2022      SUBJECTIVE:  Ever díaz 82 y. o.year old male presents today   Chief Complaint   Patient presents with    3 Month Follow-Up    Results     Lab review     Pt is here for f/u of HLD HTN osteoenia, pacemaker . Patient has hyperlipidemia. Taking medications. No abdominal pain, no nausea or vomiting. No myalgias. Patient has hypertension. Taking medications No headaches, no chest pain, no palpitations and no dizziness. Patient sees cardiologist for pacemaker check. He has osteopenia and is taking calcium and vitamin D  Taking medications regularly. No side effects noted. Patient denies any pain in the knees that is controlled well. He does have a history of melanoma in the upper back and sees dermatologist on a regular basis  Patient right shoulder surgery helped his pain. No shoulder pain now. Review of Systems   Constitutional: Negative. Negative for chills, diaphoresis and fever. HENT: Negative. Eyes: Negative. Respiratory: Negative. Negative for cough, shortness of breath and wheezing. Cardiovascular: Negative. Negative for leg swelling. Gastrointestinal: Negative. Endocrine: Negative. Genitourinary: Negative. Musculoskeletal: Negative. Skin: Negative. Allergic/Immunologic: Negative. Neurological: Negative. Hematological: Negative. Psychiatric/Behavioral: Negative. OBJECTIVE: /88   Pulse 80   Temp 97 °F (36.1 °C) (Temporal)   Resp 16   Wt 241 lb 3.2 oz (109.4 kg)   SpO2 95%   BMI 34.61 kg/m²     Wt Readings from Last 3 Encounters:   22 241 lb 3.2 oz (109.4 kg)   22 239 lb (108.4 kg)   22 239 lb 3.2 oz (108.5 kg)      Patient was seen taking COVID-19 precautions. Face mask, gloves were used. Patient also wore facemask. GENERAL: - Alert, oriented, pleasant, in no apparent distress. HEENT: - Conjunctiva pink, no scleral icterus. ENT clear.   NECK: -Supple. No jugular venous distention noted. No masses felt,  CARDIOVASCULAR: - Normal S1 and S2    PULMONARY: - No respiratory distress. No wheezes or rales. ABDOMEN: - Soft and non-tender,no masses  ororganomegaly. EXTREMITIES: - No cyanosis, clubbing, or significant edema. SKIN: Skin is warm and dry. NEUROLOGICAL: - Cranial nerves II through XII are grossly intact. IMPRESSION:    Encounter Diagnoses   Name Primary? Mixed hyperlipidemia     Essential hypertension     Cardiac pacemaker     Osteopenia, unspecified location     Primary osteoarthritis of both knees     Malignant melanoma, unspecified site ([facility] [address]     Stage 3 chronic kidney disease, unspecified whether stage 3a or 3b CKD (HCC)     Monocytosis        ASSESSMENT/PLAN:    1. Mixed hyperlipidemia  Overview:  Hyperlipidemia is stable. Follow low cholesterol diet. Continue current treatment.  -takes lipitor, omega-3 . Assessment & Plan:    : lipid show tri still slightly high  Hyperlipidemia is stable. Follow low cholesterol diet. Continue current treatment. 2. Essential hypertension  Overview:  Hypertension in control. Follow low salt diet. Continue current treatment. Patient is on metoprolol and lisinopril with hydrochlorothiazide  Assessment & Plan:    Hypertension in control. Follow low salt diet. Continue current treatment. 3. 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 Sees Dr. Amarilis Summers in  8/3/16  Assessment & Plan:    sees cardiologist   4. Osteopenia, unspecified location  Overview:  -takes Calcium + vitamin D3  2000 units a day. Had fosamax in the past.  -bone density in 2/16 osteoporosis  -5/2021 :  Dexa scan osteopenia     Assessment & Plan:    : contine above treatment     5. Primary osteoarthritis of both knees  Overview:  DJD of both knees. No surgery. Seen Ortho one at [facility] in the past  Pain has improved and is not bothering him any more.   Assessment & Plan:    : doing well. No pain. 6. Malignant melanoma, unspecified site [facility])  Overview:  -Right side of neck below the right ear 4/16  -Upper back lesion in 4/16. Both melanoma per pt. Seen Dr. Jennifer Bonner in [place]. [place]  -Excised completely per pt. Sees dermatologist q year. Assessment & Plan:    ;sees dermatologist.   7. Stage 3 chronic kidney disease, unspecified whether stage 3a or 3b CKD ([facility] [address]  Overview:  Cr fluctuates   Last cr 1.3, 1.2 , 1.0  Assessment & Plan:    improved. 8. Monocytosis  Overview:  Pt has monocytosis since 2017  Discussed with patient in detail. Absolute monocytes are normal  Referred to Dr Jeong Greek : stable   Assessment & Plan:    sees him on regular basis . Stable     No orders of the defined types were placed in this encounter. Mediations reviewed with the patient. Continue current medications. Appropriate prescriptions are addressed. After visit summeryprovided. Follow up as directed sooner if needed. Questions answered and patient verbalizes understanding. Call for any problems, questions, or concerns. No Known Allergies  Current Outpatient Medications   Medication Sig Dispense Refill    atorvastatin (LIPITOR) 40 MG tablet Take 1 tablet by mouth in the morning. 90 tablet 3    lisinopril-hydroCHLOROthiazide (PRINZIDE;ZESTORETIC) 20-12.5 MG per tablet Take 1 tablet by mouth daily 90 tablet 1    metoprolol tartrate (LOPRESSOR) 25 MG tablet Take 1 tablet by mouth 2 times daily 180 tablet 1    Omega-3 Fatty Acids (FISH OIL) 1000 MG CPDR Take 1,000 mg by mouth 2 times daily       Cholecalciferol (VITAMIN D3) 2000 UNITS CAPS Take 2,000 Units by mouth daily       aspirin 81 MG tablet Take 81 mg by mouth daily        Calcium Citrate (CITRACAL PO) Take 2 tablets by mouth daily       Multiple Vitamin (MULTIVITAMIN PO) Take  by mouth daily. GLUCOSAMINE HCL Take 1 tablet by mouth 2 times daily       No current facility-administered medications for this visit.      Past Medical History: Diagnosis Date    Arthritis     right shoulder and knees    DJD (degenerative joint disease) of knee 6/3/2014    H/O 24 hour EKG monitoring 06    Abnormal-LBBB with second degree mobitz type 2    H/O cardiovascular stress test 10/17/01    Abnormal-apical ischemia in the distribution of distal LAD EF40%    H/O colonoscopy ,     Dr. Juan Cruz    H/O echocardiogram 06    Mild pulmonary hypertension,mild aortic root dilation EF 50-55%    History of pacemaker 2006    Medtronic    Hyperlipidemia     Hypertension     Melanoma ([facility] [address] of neck and upper back 5/3/2016    Right side of neck below the right ear Upper back. Seen Dr. Jennifer Bonner in [place]. [place] Excised completely per pt. Billitz (type) II atrioventricular block 06    Osteopenia     Pacemaker at end of battery life 2016    Primary osteoarthritis of both knees 2/3/2016    DJD of both knees. No surgery.  Seen Ortho at [place] in the past     Stage 3 chronic kidney disease ([facility] [address] 2018     Past Surgical History:   Procedure Laterality Date    FRACTURE SURGERY      Had acute accident broken sternum & brused heart 1953-Lt leg plate, [id number] wrist x 2, ?-Rt shoulder x 2, ?-Lt hip fracture with plates and rods    HIP SURGERY Left     PACEMAKER PLACEMENT  2006    PACEMAKER PLACEMENT  2016    Generator Changeout for End of Life    SKIN CANCER EXCISION  3/2015     Social History     Tobacco Use    Smoking status: Former     Packs/day: 3.00     Years: 6.00     Pack years: 18.00     Types: Cigarettes     Start date: 8/15/1961     Quit date: 10/27/1965     Years since quittin.8    Smokeless tobacco: Never   Substance Use Topics    Alcohol use: No     Alcohol/week: 0.0 standard drinks       LAB REVIEW:  CBC:   Lab Results   Component Value Date/Time    WBC 8.0 2021 02:00 PM    HGB 14.4 2021 02:00 PM    HCT 42.6 2021 02:00 PM     2021 02:00 PM     Lipids:   Lab Results   Component Value Date HDL 38 (L) 08/09/2022    LDLCALC 68 08/09/2022    LDLDIRECT 159 (H) 01/26/2022    TRIGLYCFAST 195 (H) 08/09/2022    CHOLFAST 145 08/09/2022     Renal:   Lab Results   Component Value Date/Time    BUN 21 08/09/2022 11:50 AM    CREATININE 1.0 08/09/2022 11:50 AM     08/09/2022 11:50 AM    K 4.1 08/09/2022 11:50 AM    ALT 31 08/09/2022 11:50 AM    AST 26 08/09/2022 11:50 AM    GLUCOSE 91 04/16/2020 10:00 AM    GLUF 90 08/09/2022 11:50 AM     PT/INR:   Lab Results   Component Value Date/Time    INR 0.96 08/01/2016 10:00 AM     A1C: No results found for: Megan Shane MD, 9/6/2022 , 11:44 AM

## 2022-11-07 ENCOUNTER — TELEPHONE (OUTPATIENT)
Dept: CARDIOLOGY CLINIC | Age: 87
End: 2022-11-07

## 2022-11-10 PROCEDURE — 93296 REM INTERROG EVL PM/IDS: CPT | Performed by: INTERNAL MEDICINE

## 2022-11-10 PROCEDURE — 93294 REM INTERROG EVL PM/LDLS PM: CPT | Performed by: INTERNAL MEDICINE

## 2022-11-15 ENCOUNTER — PROCEDURE VISIT (OUTPATIENT)
Dept: CARDIOLOGY CLINIC | Age: 87
End: 2022-11-15
Payer: MEDICARE

## 2022-11-15 DIAGNOSIS — Z95.0 CARDIAC PACEMAKER IN SITU: ICD-10-CM

## 2022-11-17 ENCOUNTER — OFFICE VISIT (OUTPATIENT)
Dept: ONCOLOGY | Age: 87
End: 2022-11-17
Payer: MEDICARE

## 2022-11-17 ENCOUNTER — HOSPITAL ENCOUNTER (OUTPATIENT)
Dept: INFUSION THERAPY | Age: 87
Discharge: HOME | End: 2022-11-17
Payer: MEDICARE

## 2022-11-17 VITALS
RESPIRATION RATE: 16 BRPM | HEART RATE: 83 BPM | SYSTOLIC BLOOD PRESSURE: 139 MMHG | TEMPERATURE: 97.7 F | HEIGHT: 70 IN | WEIGHT: 241 LBS | DIASTOLIC BLOOD PRESSURE: 69 MMHG | BODY MASS INDEX: 34.5 KG/M2 | OXYGEN SATURATION: 96 %

## 2022-11-17 DIAGNOSIS — D72.821 MONOCYTOSIS: ICD-10-CM

## 2022-11-17 DIAGNOSIS — D72.821 MONOCYTOSIS: Primary | ICD-10-CM

## 2022-11-17 LAB
BASOPHILS ABSOLUTE: 0 K/CU MM
BASOPHILS RELATIVE PERCENT: 0.2 % (ref 0–1)
DIFFERENTIAL TYPE: ABNORMAL
EOSINOPHILS ABSOLUTE: 0.2 K/CU MM
EOSINOPHILS RELATIVE PERCENT: 2.6 % (ref 0–3)
HCT VFR BLD CALC: 46.1 % (ref 42–52)
HEMOGLOBIN: 15.5 GM/DL (ref 13.5–18)
LACTATE DEHYDROGENASE: 231 IU/L (ref 120–246)
LYMPHOCYTES ABSOLUTE: 3 K/CU MM
LYMPHOCYTES RELATIVE PERCENT: 35 % (ref 24–44)
MCH RBC QN AUTO: 32.5 PG (ref 27–31)
MCHC RBC AUTO-ENTMCNC: 33.6 % (ref 32–36)
MCV RBC AUTO: 96.6 FL (ref 78–100)
MONOCYTES ABSOLUTE: 1 K/CU MM
MONOCYTES RELATIVE PERCENT: 11.7 % (ref 0–4)
PDW BLD-RTO: 13.6 % (ref 11.7–14.9)
PLATELET # BLD: 194 K/CU MM (ref 140–440)
PMV BLD AUTO: 10.3 FL (ref 7.5–11.1)
RBC # BLD: 4.77 M/CU MM (ref 4.6–6.2)
SEGMENTED NEUTROPHILS ABSOLUTE COUNT: 4.4 K/CU MM
SEGMENTED NEUTROPHILS RELATIVE PERCENT: 50.5 % (ref 36–66)
WBC # BLD: 8.7 K/CU MM (ref 4–10.5)

## 2022-11-17 PROCEDURE — 1123F ACP DISCUSS/DSCN MKR DOCD: CPT | Performed by: INTERNAL MEDICINE

## 2022-11-17 PROCEDURE — 36415 COLL VENOUS BLD VENIPUNCTURE: CPT

## 2022-11-17 PROCEDURE — 3078F DIAST BP <80 MM HG: CPT | Performed by: INTERNAL MEDICINE

## 2022-11-17 PROCEDURE — 83615 LACTATE (LD) (LDH) ENZYME: CPT

## 2022-11-17 PROCEDURE — 85025 COMPLETE CBC W/AUTO DIFF WBC: CPT

## 2022-11-17 PROCEDURE — 99213 OFFICE O/P EST LOW 20 MIN: CPT | Performed by: INTERNAL MEDICINE

## 2022-11-17 PROCEDURE — 3074F SYST BP LT 130 MM HG: CPT | Performed by: INTERNAL MEDICINE

## 2022-11-17 PROCEDURE — 99211 OFF/OP EST MAY X REQ PHY/QHP: CPT

## 2022-11-17 NOTE — PROGRESS NOTES
MA Rooming Questions  Patient: Colby Roche  MRN: [id number]    Date: 11/17/2022        1. Do you have any new issues?   no         2. Do you need any refills on medications?    no    3. Have you had any imaging done since your last visit?   no    4. Have you been hospitalized or seen in the emergency room since your last visit here?   no    5. Did the patient have a depression screening completed today?  No    No data recorded     PHQ-9 Given to (if applicable):               PHQ-9 Score (if applicable):                     [] Positive     []  Negative              Does question #9 need addressed (if applicable)                     [] Yes    []  No               Oswaldo Conley CMA

## 2022-11-17 NOTE — PROGRESS NOTES
Patient Name:  Rhoda Jensen  Patient :  1935  Patient MRN:  [id number]     Primary Oncologist: Samara Agustin MD  Referring Provider: Kim Barajas MD     Date of Service: 2022      Chief Complaint:    Chief Complaint   Patient presents with    Follow-up     Patient Active Problem List:     Mixed hyperlipidemia     Cardiac pacemaker     Essential hypertension     Primary osteoarthritis of both knees     Melanoma ([facility] [address] of neck and upper back     Stage 3 chronic kidney disease ([address]     Monocytosis     Age-related osteoporosis without current pathological fracture    HPI:   Rhoda Jensen is a 14-year-old very pleasant gentleman with medical history significant for hypertension, hyperlipidemia, Mobitz type II AV block, status post pacemaker placement in 2016, chronic kidney disease, history of melanoma and osteoarthritis, initially referred to me on May 17, 2021 for evaluation of his relative monocytosis. He stated that he has been having relative monocytosis since 2017 and it has been quite stable over the course. His absolute monocyte count has always been normal. He does not have anemia, neutropenia or thrombocytopenia. Dr. Kim Barajas has been monitoring it closely. Because of persistent relative monocytosis, he was subsequently referred to me for further evaluation. His absolute monocyte count has always been normal. He does not have anemia, neutropenia or thrombocytopenia. Dr. Kim Barajas has been monitoring it closely. I agree with Dr. Nikhil Washington and I recommend to continue with close observation. Since his absolute monocytosis is within normal range, I believe it is due to reactive process. No additional investigation needed at this moment. On 2022, he presented to me for follow-up. I have been following him for relative monocytosis and he has been under close observation.     I recognize that he has stable absolute monocyte count (1000/cumm) on today blood test. He does not have anemia or neutropenia. Since his absolute monocyte count is stable, I recommend to continue with observation. I will plan to see him back in 1 year. He does not have any significant symptoms at today visit. Past Medical History:     Significant for  1. Hypertension  2. Hyperlipidemia  3. Morbitz type II AV block status post dual-chamber pacemaker placement on August 12, 2016  4. Chronic kidney disease  5. Melanoma of neck and upper back status post excision  6. Osteoarthritis    Past Surgery History:    Significant for  1. Permanent pacemaker placement  2. Skin cancer excision  3. Left leg fracture surgery in 1953  4. Left wrist surgery in 1994  5. Left hip fracture surgery    Social History:   He is a former smoker and he quit smoking in 1963. He used to smoke approximately 2 packs a day for 5 years. He denies alcohol drinking or illicit drug abuse. Family History:    Significant for breast cancer in his sister and daughter. Allergies:  No known drug allergies. Review of Systems: \"Per interval history; otherwise 10 point ROS is negative. \"  His energy level is stable and his sleep is fine. He denies fever, chills, night sweats, cough, shortness of breath, chest pain, hemoptysis or palpitations. His bowel and bladder functions are normal. He doesn't have nausea, vomiting, abdominal pain, diarrhea, constipation, dysuria, loss of appetite, or weight loss. He denies neuropathy and he doesn't have bleeding or clotting issues. He denies any pain in his body. No anxiety or depression. The rest of the systems are unremarkable.      Vital Signs: /69 (Site: Right Upper Arm, Position: Sitting, Cuff Size: Medium Adult)   Pulse 83   Temp 97.7 °F (36.5 °C) (Temporal)   Resp 16   Ht 5' 10\" (1.778 m)   Wt 241 lb (109.3 kg)   SpO2 96%   BMI 34.58 kg/m²      Physical Exam:  CONSTITUTIONAL: awake, alert, cooperative, no apparent distress   EYES: pupils equal, round and reactive to light, sclera clear, normal conjunctiva  ENT: Normocephalic, without obvious abnormality, atraumatic  NECK: supple, symmetrical, no jugular venous distension, no carotid bruits   HEMATOLOGIC/LYMPHATIC: no cervical, supraclavicular or axillary lymphadenopathy   LUNGS: VBS, no wheezes, clear to auscultation, no crackles, no rhonchi, no increased work of breathing,    CARDIOVASCULAR: regular rate and rhythm, normal S1 and S2, no murmur noted  ABDOMEN: normal bowel sounds x 4, soft, non-distended, non-tender, no masses palpated, no hepatosplenomegaly   MUSCULOSKELETAL: full range of motion noted, tone is normal  NEUROLOGIC: awake, alert, oriented to name, place and time. Motor skills grossly intact. SKIN: appears intact, normal skin color, normal texture, normal turgor, no jaundice.    EXTREMITIES: no clubbing, no LE edema, no cyanosis, no leg swelling,       Labs:  Hematology:  Lab Results   Component Value Date    WBC 8.7 11/17/2022    RBC 4.77 11/17/2022    HGB 15.5 11/17/2022    HCT 46.1 11/17/2022    MCV 96.6 11/17/2022    MCH 32.5 (H) 11/17/2022    MCHC 33.6 11/17/2022    RDW 13.6 11/17/2022     11/17/2022    MPV 10.3 11/17/2022    SEGSPCT 50.5 11/17/2022    EOSRELPCT 2.6 11/17/2022    BASOPCT 0.2 11/17/2022    LYMPHOPCT 35.0 11/17/2022    MONOPCT 11.7 (H) 11/17/2022    SEGSABS 4.4 11/17/2022    EOSABS 0.2 11/17/2022    BASOSABS 0.0 11/17/2022    LYMPHSABS 3.0 11/17/2022    MONOSABS 1.0 11/17/2022    DIFFTYPE AUTOMATED DIFFERENTIAL 11/17/2022     No results found for: ESR  Chemistry:  Lab Results   Component Value Date     08/09/2022    K 4.1 08/09/2022     08/09/2022    CO2 25 08/09/2022    BUN 21 08/09/2022    CREATININE 1.0 08/09/2022    GLUCOSE 91 04/16/2020    CALCIUM 9.7 08/09/2022    PROT 6.9 08/09/2022    LABALBU 4.1 08/09/2022    BILITOT 0.7 08/09/2022    ALKPHOS 58 08/09/2022    AST 26 08/09/2022    ALT 31 08/09/2022    LABGLOM >60 08/09/2022    GFRAA >60 08/09/2022 AGRATIO 2.0 02/12/2019    GLOB 2.1 02/12/2019     Lab Results   Component Value Date     11/17/2022     No components found for: LD  Lab Results   Component Value Date    TSHHS 2.713 01/22/2011    T4FREE 1.06 01/22/2011    FT3 3.0 01/22/2011     Immunology:  Lab Results   Component Value Date    PROT 6.9 08/09/2022     No results found for: Cecile Ivanoff, KLFLCR  No results found for: B2M  Coagulation Panel:  Lab Results   Component Value Date    PROTIME 11.2 08/01/2016    INR 0.96 08/01/2016     Anemia Panel:  No results found for: Christiansen Distad  Tumor Markers:  Lab Results   Component Value Date    PSA 0.84 01/24/2012     Observations:  No data recorded    Assessment   Relative monocytosis    Plan:                                                                                                                               Lon Olson is a 57-year-old very pleasant gentleman who has been having relative monocytosis since July 2017 and it has been quite stable over the course. His absolute monocyte count has always been normal. He does not have anemia, neutropenia or thrombocytopenia. Dr. Chanell Kaye has been monitoring it closely. I agree with Dr. Pedro Luis Green and I recommend to continue with close observation. Since his absolute monocytosis is within normal range, I believe it is due to reactive process. No additional investigation needed at this moment. On November 17, 2022, he presented to me for follow-up. I have been following him for relative monocytosis and he has been under close observation. I recognize that he has stable absolute monocyte count (1000/cumm) on today blood test.  He does not have anemia or neutropenia. Since his absolute monocyte count is stable, I recommend to continue with observation. I will plan to see him back in 1 year. I answered all his questions and concerns for today. Recent imaging and labs were reviewed and discussed with the patient.

## 2022-11-29 ENCOUNTER — OFFICE VISIT (OUTPATIENT)
Dept: INTERNAL MEDICINE CLINIC | Age: 87
End: 2022-11-29
Payer: MEDICARE

## 2022-11-29 VITALS
SYSTOLIC BLOOD PRESSURE: 144 MMHG | DIASTOLIC BLOOD PRESSURE: 80 MMHG | HEART RATE: 80 BPM | TEMPERATURE: 97.2 F | OXYGEN SATURATION: 96 % | RESPIRATION RATE: 16 BRPM | WEIGHT: 241.6 LBS | BODY MASS INDEX: 34.67 KG/M2

## 2022-11-29 DIAGNOSIS — D72.821 MONOCYTOSIS: ICD-10-CM

## 2022-11-29 DIAGNOSIS — C43.9 MALIGNANT MELANOMA, UNSPECIFIED SITE (HCC): ICD-10-CM

## 2022-11-29 DIAGNOSIS — I49.5 SICK SINUS SYNDROME (HCC): ICD-10-CM

## 2022-11-29 DIAGNOSIS — N18.30 STAGE 3 CHRONIC KIDNEY DISEASE, UNSPECIFIED WHETHER STAGE 3A OR 3B CKD (HCC): ICD-10-CM

## 2022-11-29 DIAGNOSIS — Z95.0 CARDIAC PACEMAKER: ICD-10-CM

## 2022-11-29 DIAGNOSIS — M85.80 OSTEOPENIA, UNSPECIFIED LOCATION: ICD-10-CM

## 2022-11-29 DIAGNOSIS — I10 ESSENTIAL HYPERTENSION: ICD-10-CM

## 2022-11-29 DIAGNOSIS — E78.2 MIXED HYPERLIPIDEMIA: Primary | ICD-10-CM

## 2022-11-29 PROCEDURE — 3074F SYST BP LT 130 MM HG: CPT | Performed by: INTERNAL MEDICINE

## 2022-11-29 PROCEDURE — 1123F ACP DISCUSS/DSCN MKR DOCD: CPT | Performed by: INTERNAL MEDICINE

## 2022-11-29 PROCEDURE — 3078F DIAST BP <80 MM HG: CPT | Performed by: INTERNAL MEDICINE

## 2022-11-29 PROCEDURE — 99214 OFFICE O/P EST MOD 30 MIN: CPT | Performed by: INTERNAL MEDICINE

## 2022-11-29 RX ORDER — LISINOPRIL AND HYDROCHLOROTHIAZIDE 20; 12.5 MG/1; MG/1
1 TABLET ORAL DAILY
Qty: 90 TABLET | Refills: 1 | Status: SHIPPED | OUTPATIENT
Start: 2022-11-29

## 2022-11-29 ASSESSMENT — PATIENT HEALTH QUESTIONNAIRE - PHQ9
1. LITTLE INTEREST OR PLEASURE IN DOING THINGS: 0
SUM OF ALL RESPONSES TO PHQ QUESTIONS 1-9: 0
SUM OF ALL RESPONSES TO PHQ QUESTIONS 1-9: 0
SUM OF ALL RESPONSES TO PHQ9 QUESTIONS 1 & 2: 0
SUM OF ALL RESPONSES TO PHQ QUESTIONS 1-9: 0
SUM OF ALL RESPONSES TO PHQ QUESTIONS 1-9: 0
2. FEELING DOWN, DEPRESSED OR HOPELESS: 0

## 2022-11-29 NOTE — PROGRESS NOTES
Chris Rod  Patient's  is 1935  Seen in office on 2022      SUBJECTIVE:  Adrian díaz 82 y. o.year old male presents today   Chief Complaint   Patient presents with    3 Month Follow-Up    Medication Refill    Other     Sees Dr David Conte       Patient is here for follow-up of hypertension, hyperlipidemia, sick sinus syndrome and monocytosis. Patient has hypertension. Taking medications No headaches, no chest pain, no palpitations and no dizziness. Patient has hyperlipidemia. Taking medications. No abdominal pain, no nausea or vomiting. No myalgias. Patient has monocytosis and sees Dr. Gian Graves.  His blood counts are stable. And is going to see him once a year. He has a pacemaker and is following with cardiologist.  Overall he is feeling well. No falls and no injuries. He is able to do his normal activities. Is able to drive  Taking medications regularly. No side effects noted. Review of Systems  Review of system normal except as in HPI  OBJECTIVE: BP (!) 144/80   Pulse 80   Temp 97.2 °F (36.2 °C) (Temporal)   Resp 16   Wt 241 lb 9.6 oz (109.6 kg)   SpO2 96%   BMI 34.67 kg/m²     Wt Readings from Last 3 Encounters:   22 241 lb 9.6 oz (109.6 kg)   22 241 lb (109.3 kg)   22 241 lb 3.2 oz (109.4 kg)      GENERAL: - Alert, oriented, pleasant, in no apparent distress. HEENT: - Conjunctiva pink, no scleral icterus. ENT clear. NECK: -Supple. No jugular venous distention noted. No masses felt,  CARDIOVASCULAR: - Normal S1 and S2    PULMONARY: - No respiratory distress. No wheezes or rales. ABDOMEN: - Soft and non-tender,no masses  ororganomegaly. EXTREMITIES: - No cyanosis, clubbing, or significant edema. SKIN: Skin is warm and dry. NEUROLOGICAL: - Cranial nerves II through XII are grossly intact. IMPRESSION:    Encounter Diagnoses   Name Primary?     Mixed hyperlipidemia Yes    Sick sinus syndrome [facility])     Essential hypertension     Cardiac pacemaker     Osteopenia, sooner if needed. Questions answered and patient verbalizes understanding. Call for any problems, questions, or concerns. No Known Allergies  Current Outpatient Medications   Medication Sig Dispense Refill    atorvastatin (LIPITOR) 40 MG tablet Take 1 tablet by mouth in the morning. 90 tablet 3    lisinopril-hydroCHLOROthiazide (PRINZIDE;ZESTORETIC) 20-12.5 MG per tablet Take 1 tablet by mouth daily 90 tablet 1    metoprolol tartrate (LOPRESSOR) 25 MG tablet Take 1 tablet by mouth 2 times daily 180 tablet 1    Omega-3 Fatty Acids (FISH OIL) 1000 MG CPDR Take 1,000 mg by mouth 2 times daily       Cholecalciferol (VITAMIN D3) 2000 UNITS CAPS Take 2,000 Units by mouth daily       aspirin 81 MG tablet Take 81 mg by mouth daily        Calcium Citrate (CITRACAL PO) Take 2 tablets by mouth daily       Multiple Vitamin (MULTIVITAMIN PO) Take  by mouth daily. GLUCOSAMINE HCL Take 1 tablet by mouth 2 times daily       No current facility-administered medications for this visit. Past Medical History:   Diagnosis Date    Arthritis     right shoulder and knees    DJD (degenerative joint disease) of knee 6/3/2014    H/O 24 hour EKG monitoring 11/28/06    Abnormal-LBBB with second degree mobitz type 2    H/O cardiovascular stress test 10/17/01    Abnormal-apical ischemia in the distribution of distal LAD EF40%    H/O colonoscopy 2004, 7/09    Dr. Johnell Nageotte    H/O echocardiogram 12/12/06    Mild pulmonary hypertension,mild aortic root dilation EF 50-55%    History of pacemaker 12/14/2006    [employer]tronic    Hyperlipidemia     Hypertension     Melanoma ([address] of neck and upper back 5/3/2016    Right side of neck below the right ear Upper back. Seen Dr. Forest Eddy in [facility], [place]. [place] Excised completely per pt. Mobitz (type) II atrioventricular block 11/28/06    Osteopenia     Pacemaker at end of battery life 08/03/2016    Primary osteoarthritis of both knees 2/3/2016    DJD of both knees. No surgery.  Seen Ortho at [facility], [place] in the past     Stage 3 chronic kidney disease ([facility] [address] 2018     Past Surgical History:   Procedure Laterality Date    FRACTURE SURGERY      Had acute accident broken sternum & brused heart 3-Lt leg plate, 4089-GF wrist x 2, ?-Rt shoulder x 2, ?-Lt hip fracture with plates and rods    HIP SURGERY Left     PACEMAKER PLACEMENT  2006    PACEMAKER PLACEMENT  2016    Generator Changeout for End of Life    SKIN CANCER EXCISION  3/2015     Social History     Tobacco Use    Smoking status: Former     Packs/day: 3.00     Years: 6.00     Pack years: 18.00     Types: Cigarettes     Start date: 8/15/1961     Quit date: 10/27/1965     Years since quittin.1    Smokeless tobacco: Never   Substance Use Topics    Alcohol use: No     Alcohol/week: 0.0 standard drinks       LAB REVIEW:  CBC:   Lab Results   Component Value Date/Time    WBC 8.7 2022 02:19 PM    HGB 15.5 2022 02:19 PM    HCT 46.1 2022 02:19 PM     2022 02:19 PM     Lipids:   Lab Results   Component Value Date    HDL 38 (L) 2022    LDLCALC 68 2022    LDLDIRECT 159 (H) 2022    TRIGLYCFAST 195 (H) 2022    CHOLFAST 145 2022     Renal:   Lab Results   Component Value Date/Time    BUN 21 2022 11:50 AM    CREATININE 1.0 2022 11:50 AM     2022 11:50 AM    K 4.1 2022 11:50 AM    ALT 31 2022 11:50 AM    AST 26 2022 11:50 AM    GLUCOSE 91 2020 10:00 AM    GLUF 90 2022 11:50 AM     PT/INR:   Lab Results   Component Value Date/Time    INR 0.96 2016 10:00 AM     A1C: No results found for: Alejandro Whittington MD, 2022 , 3:46 PM

## 2023-02-14 RX ORDER — ATORVASTATIN CALCIUM 40 MG/1
TABLET, FILM COATED ORAL
Qty: 90 TABLET | Refills: 1 | Status: SHIPPED | OUTPATIENT
Start: 2023-02-14

## 2023-02-14 NOTE — TELEPHONE ENCOUNTER
----- Message from Jeevan Cassidy sent at 2/14/2023  9:59 AM EST -----  Subject: Refill Request    QUESTIONS  Name of Medication? atorvastatin (LIPITOR) 40 MG tablet  Patient-reported dosage and instructions? Take 1 tablet by mouth in the   morning. How many days do you have left? 0  Preferred Pharmacy? [id number] phone number (if available)? [phone]  ---------------------------------------------------------------------------  --------------  CALL BACK INFO  What is the best way for the office to contact you? OK to leave message on   voicemail  Preferred Call Back Phone Number? [phone]  ---------------------------------------------------------------------------  --------------  SCRIPT ANSWERS  Relationship to Patient?  Self

## 2023-02-15 ENCOUNTER — HOSPITAL ENCOUNTER (OUTPATIENT)
Age: 88
Discharge: HOME | End: 2023-02-15
Payer: MEDICARE

## 2023-02-15 LAB
CHOLESTEROL, FASTING: 154 MG/DL
HDLC SERPL-MCNC: 39 MG/DL
LDLC SERPL CALC-MCNC: 67 MG/DL
TRIGLYCERIDE, FASTING: 240 MG/DL

## 2023-02-15 PROCEDURE — 36415 COLL VENOUS BLD VENIPUNCTURE: CPT

## 2023-02-15 PROCEDURE — 80061 LIPID PANEL: CPT

## 2023-02-20 ENCOUNTER — TELEPHONE (OUTPATIENT)
Dept: INTERNAL MEDICINE CLINIC | Age: 88
End: 2023-02-20

## 2023-02-20 NOTE — TELEPHONE ENCOUNTER
Medication was sent to pharmacy on 2-  ----- Message from Tonjaoris Marya sent at 2/14/2023  9:59 AM EST -----  Subject: Refill Request    QUESTIONS  Name of Medication? atorvastatin (LIPITOR) 40 MG tablet  Patient-reported dosage and instructions? Take 1 tablet by mouth in the   morning. How many days do you have left? 0  Preferred Pharmacy? [address] [facility] phone number (if available)? [phone]  ---------------------------------------------------------------------------  --------------  CALL BACK INFO  What is the best way for the office to contact you? OK to leave message on   voicemail  Preferred Call Back Phone Number? [phone]  ---------------------------------------------------------------------------  --------------  SCRIPT ANSWERS  Relationship to Patient?  Self

## 2023-03-02 ENCOUNTER — OFFICE VISIT (OUTPATIENT)
Dept: INTERNAL MEDICINE CLINIC | Age: 88
End: 2023-03-02
Payer: MEDICARE

## 2023-03-02 VITALS
RESPIRATION RATE: 16 BRPM | TEMPERATURE: 97.3 F | SYSTOLIC BLOOD PRESSURE: 132 MMHG | DIASTOLIC BLOOD PRESSURE: 72 MMHG | OXYGEN SATURATION: 95 % | WEIGHT: 239.2 LBS | BODY MASS INDEX: 34.32 KG/M2 | HEART RATE: 72 BPM

## 2023-03-02 DIAGNOSIS — I10 ESSENTIAL HYPERTENSION: ICD-10-CM

## 2023-03-02 DIAGNOSIS — D72.821 MONOCYTOSIS: ICD-10-CM

## 2023-03-02 DIAGNOSIS — C43.9 MALIGNANT MELANOMA, UNSPECIFIED SITE (HCC): ICD-10-CM

## 2023-03-02 DIAGNOSIS — M85.80 OSTEOPENIA, UNSPECIFIED LOCATION: ICD-10-CM

## 2023-03-02 DIAGNOSIS — M17.0 PRIMARY OSTEOARTHRITIS OF BOTH KNEES: ICD-10-CM

## 2023-03-02 DIAGNOSIS — E78.2 MIXED HYPERLIPIDEMIA: Primary | ICD-10-CM

## 2023-03-02 DIAGNOSIS — Z95.0 CARDIAC PACEMAKER: ICD-10-CM

## 2023-03-02 DIAGNOSIS — N18.30 STAGE 3 CHRONIC KIDNEY DISEASE, UNSPECIFIED WHETHER STAGE 3A OR 3B CKD (HCC): ICD-10-CM

## 2023-03-02 PROCEDURE — 99214 OFFICE O/P EST MOD 30 MIN: CPT | Performed by: INTERNAL MEDICINE

## 2023-03-02 PROCEDURE — 1123F ACP DISCUSS/DSCN MKR DOCD: CPT | Performed by: INTERNAL MEDICINE

## 2023-03-02 PROCEDURE — 3078F DIAST BP <80 MM HG: CPT | Performed by: INTERNAL MEDICINE

## 2023-03-02 PROCEDURE — 3075F SYST BP GE 130 - 139MM HG: CPT | Performed by: INTERNAL MEDICINE

## 2023-03-02 ASSESSMENT — PATIENT HEALTH QUESTIONNAIRE - PHQ9
SUM OF ALL RESPONSES TO PHQ QUESTIONS 1-9: 0
SUM OF ALL RESPONSES TO PHQ QUESTIONS 1-9: 0
1. LITTLE INTEREST OR PLEASURE IN DOING THINGS: 0
SUM OF ALL RESPONSES TO PHQ QUESTIONS 1-9: 0
SUM OF ALL RESPONSES TO PHQ9 QUESTIONS 1 & 2: 0
2. FEELING DOWN, DEPRESSED OR HOPELESS: 0
SUM OF ALL RESPONSES TO PHQ QUESTIONS 1-9: 0

## 2023-03-02 NOTE — PROGRESS NOTES
Kindra Villa  Patient's  is 1935  Seen in office on 3/2/2023      SUBJECTIVE:  Ashok díaz 82 y. o.year old male presents today   Chief Complaint   Patient presents with    3 Month Follow-Up    Results     Lab review    Cough     Productive, since Sat      Is here for follow-up of hypertension, hyperlipidemia and arthritis. He had a lipid profile done which is acceptable but CMP was not done  Patient states he has a cough and congestion for the last few days. It has improved since then but he still has some residual cough. He is feeling well no fever or chills. No shortness of breath  Patient has hypertension. Taking medications No headaches, no chest pain, no palpitations and no dizziness. Blood pressure is running good at home  Patient has hyperlipidemia. Taking medications. No abdominal pain, no nausea or vomiting. No myalgias. He has a pacemaker and sees Dr. Mike Bird for checkups    Taking medications regularly. No side effects noted. Review of Systems   Constitutional: Negative. HENT: Negative. Eyes: Negative. Respiratory: Negative. Cardiovascular: Negative. Gastrointestinal: Negative. Endocrine: Negative. Genitourinary: Negative. Musculoskeletal: Negative. Skin: Negative. Allergic/Immunologic: Negative. Neurological: Negative. Hematological: Negative. Psychiatric/Behavioral: Negative. OBJECTIVE: /72   Pulse 72   Temp 97.3 °F (36.3 °C) (Temporal)   Resp 16   Wt 239 lb 3.2 oz (108.5 kg)   SpO2 95%   BMI 34.32 kg/m²     Wt Readings from Last 3 Encounters:   23 239 lb 3.2 oz (108.5 kg)   22 241 lb 9.6 oz (109.6 kg)   22 241 lb (109.3 kg)      GENERAL: - Alert, oriented, pleasant, in no apparent distress. HEENT: - Conjunctiva pink, no scleral icterus. ENT clear. NECK: -Supple. No jugular venous distention noted. No masses felt,  CARDIOVASCULAR: - Normal S1 and S2    PULMONARY: - No respiratory distress.   No wheezes or rales.    ABDOMEN: - Soft and non-tender,no masses  ororganomegaly. EXTREMITIES: - No cyanosis, clubbing, or significant edema. SKIN: Skin is warm and dry. NEUROLOGICAL: - Cranial nerves II through XII are grossly intact. IMPRESSION:    Encounter Diagnoses   Name Primary? Mixed hyperlipidemia Yes    Essential hypertension     Cardiac pacemaker     Osteopenia, unspecified location     Primary osteoarthritis of both knees     Malignant melanoma, unspecified site ([facility] [address]     Stage 3 chronic kidney disease, unspecified whether stage 3a or 3b CKD (HCC)     Monocytosis        ASSESSMENT/PLAN:    1. Mixed hyperlipidemia  Overview:  Hyperlipidemia is stable. Follow low cholesterol diet. Continue current treatment.  -takes lipitor, omega-3 . Lipid profile is good  2. Essential hypertension  Overview:  Hypertension in control. Follow low salt diet. Continue current treatment. Patient is on metoprolol tartrate 25 mg bid  and lisinopril with hydrochlorothiazide 20-12.5  BP normal at home     3. 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 Sees Dr. Alonso Ceja in  8/3/16    4. Osteopenia, unspecified location  Overview:  -takes Calcium + vitamin D3  2000 units a day. Had fosamax in the past.  -bone density in 2/16 osteoporosis  -5/2021 :  Dexa scan osteopenia     5. Primary osteoarthritis of both knees  Overview:  DJD of both knees. No surgery. Seen Ortho one at [facility] in the past  Pain has improved and is not bothering him any more. 6. Malignant melanoma, unspecified site [facility])  Overview:  -Right side of neck below the right ear 4/16  -Upper back lesion in 4/16. Both melanoma per pt. Seen Dr. Dominic Saba in [facility]. [place]  -Excised completely per pt. Sees dermatologist q year. 7. Stage 3 chronic kidney disease, unspecified whether stage 3a or 3b CKD (HCC)  Overview:  Cr fluctuates   Last cr 1.3, 1.2 , 1.0    8.  Monocytosis  Overview:  Pt has monocytosis since 2017  Discussed with patient in detail. Absolute monocytes are normal  Referred to Dr Cotton Born : stable   Dr Cotton Born : in one year     Reprint of the request for the blood test given to the patient. CMP    Return to office in 3 months. Mediations reviewed with the patient. Continue current medications. Appropriate prescriptions are addressed. After visit summeryprovided. Follow up as directed sooner if needed. Questions answered and patient verbalizes understanding. Call for any problems, questions, or concerns. No Known Allergies  Current Outpatient Medications   Medication Sig Dispense Refill    atorvastatin (LIPITOR) 40 MG tablet TAKE 1 TABLET BY MOUTH DAILY 90 tablet 1    lisinopril-hydroCHLOROthiazide (PRINZIDE;ZESTORETIC) 20-12.5 MG per tablet Take 1 tablet by mouth daily 90 tablet 1    metoprolol tartrate (LOPRESSOR) 25 MG tablet Take 1 tablet by mouth 2 times daily 180 tablet 1    Omega-3 Fatty Acids (FISH OIL) 1000 MG CPDR Take 1,000 mg by mouth 2 times daily       Cholecalciferol (VITAMIN D3) 2000 UNITS CAPS Take 2,000 Units by mouth daily       aspirin 81 MG tablet Take 81 mg by mouth daily        Calcium Citrate (CITRACAL PO) Take 2 tablets by mouth daily       Multiple Vitamin (MULTIVITAMIN PO) Take  by mouth daily. GLUCOSAMINE HCL Take 1 tablet by mouth 2 times daily       No current facility-administered medications for this visit.      Past Medical History:   Diagnosis Date    Arthritis     right shoulder and knees    DJD (degenerative joint disease) of knee 6/3/2014    H/O 24 hour EKG monitoring 11/28/06    Abnormal-LBBB with second degree mobitz type 2    H/O cardiovascular stress test 10/17/01    Abnormal-apical ischemia in the distribution of distal LAD EF40%    H/O colonoscopy 2004, 7/09    Dr. Hamilton Person    H/O echocardiogram 12/12/06    Mild pulmonary hypertension,mild aortic root dilation EF 50-55%    History of pacemaker 12/14/2006    [employer]tronic    Hyperlipidemia     Hypertension     Melanoma ([facility] [address] of neck and upper back 5/3/2016    Right side of neck below the right ear Upper back. Seen Dr. Deven Chakraborty in [place]. [place] Excised completely per pt. Elaina (type) II atrioventricular block 06    Osteopenia     Pacemaker at end of battery life 2016    Primary osteoarthritis of both knees 2/3/2016    DJD of both knees. No surgery.  Seen Ortho at [place] in the past     S/P shoulder surgery 8/10/2021    Stage 3 chronic kidney disease ([facility] [address] 2018     Past Surgical History:   Procedure Laterality Date    FRACTURE SURGERY      Had acute accident broken sternum & brused heart 1953-Lt leg plate, -SB wrist x 2, ?-Rt shoulder x 2, ?-Lt hip fracture with plates and rods    HIP SURGERY Left     PACEMAKER PLACEMENT  2006    PACEMAKER PLACEMENT  2016    Generator Changeout for End of Life    SKIN CANCER EXCISION  3/2015     Social History     Tobacco Use    Smoking status: Former     Packs/day: 3.00     Years: 6.00     Pack years: 18.00     Types: Cigarettes     Start date: 8/15/1961     Quit date: 10/27/1965     Years since quittin.3    Smokeless tobacco: Never   Substance Use Topics    Alcohol use: No     Alcohol/week: 0.0 standard drinks       LAB REVIEW:  CBC:   Lab Results   Component Value Date/Time    WBC 8.7 2022 02:19 PM    HGB 15.5 2022 02:19 PM    HCT 46.1 2022 02:19 PM     2022 02:19 PM     Lipids:   Lab Results   Component Value Date    HDL 39 (L) 02/15/2023    LDLCALC 67 02/15/2023    LDLDIRECT 159 (H) 2022    TRIGLYCFAST 240 (H) 02/15/2023    CHOLFAST 154 02/15/2023     Renal:   Lab Results   Component Value Date/Time    BUN 21 2022 11:50 AM    CREATININE 1.0 2022 11:50 AM     2022 11:50 AM    K 4.1 2022 11:50 AM    ALT 31 2022 11:50 AM    AST 26 2022 11:50 AM    GLUCOSE 91 2020 10:00 AM    GLUF 90 2022 11:50 AM     PT/INR:   Lab Results   Component Value Date/Time    INR 0.96 08/01/2016 10:00 AM     A1C: No results found for: Adrian Choudhury MD, 3/2/2023 , 2:02 PM

## 2023-05-05 ENCOUNTER — HOSPITAL ENCOUNTER (OUTPATIENT)
Age: 88
Discharge: HOME | End: 2023-05-05
Payer: MEDICARE

## 2023-05-05 DIAGNOSIS — E78.2 MIXED HYPERLIPIDEMIA: ICD-10-CM

## 2023-05-05 LAB
ALBUMIN SERPL-MCNC: 4.1 GM/DL (ref 3.4–5)
ALP BLD-CCNC: 63 IU/L (ref 40–129)
ALT SERPL-CCNC: 26 U/L (ref 10–40)
ANION GAP SERPL CALCULATED.3IONS-SCNC: 10 MMOL/L (ref 4–16)
AST SERPL-CCNC: 29 IU/L (ref 15–37)
BILIRUB SERPL-MCNC: 0.6 MG/DL (ref 0–1)
BUN SERPL-MCNC: 26 MG/DL (ref 6–23)
CALCIUM SERPL-MCNC: 10.3 MG/DL (ref 8.3–10.6)
CHLORIDE BLD-SCNC: 105 MMOL/L (ref 99–110)
CO2: 25 MMOL/L (ref 21–32)
CREAT SERPL-MCNC: 0.9 MG/DL (ref 0.9–1.3)
GFR SERPL CREATININE-BSD FRML MDRD: >60 ML/MIN/1.73M2
GLUCOSE SERPL-MCNC: 97 MG/DL (ref 70–99)
POTASSIUM SERPL-SCNC: 4.4 MMOL/L (ref 3.5–5.1)
SODIUM BLD-SCNC: 140 MMOL/L (ref 135–145)
TOTAL PROTEIN: 6.9 GM/DL (ref 6.4–8.2)

## 2023-05-05 PROCEDURE — 36415 COLL VENOUS BLD VENIPUNCTURE: CPT

## 2023-05-05 PROCEDURE — 80053 COMPREHEN METABOLIC PANEL: CPT

## 2023-05-26 ENCOUNTER — TELEMEDICINE (OUTPATIENT)
Dept: INTERNAL MEDICINE CLINIC | Age: 88
End: 2023-05-26
Payer: MEDICARE

## 2023-05-26 DIAGNOSIS — Z00.00 MEDICARE ANNUAL WELLNESS VISIT, SUBSEQUENT: Primary | ICD-10-CM

## 2023-05-26 PROCEDURE — 1123F ACP DISCUSS/DSCN MKR DOCD: CPT | Performed by: INTERNAL MEDICINE

## 2023-05-26 PROCEDURE — G0439 PPPS, SUBSEQ VISIT: HCPCS | Performed by: INTERNAL MEDICINE

## 2023-05-26 SDOH — ECONOMIC STABILITY: HOUSING INSECURITY
IN THE LAST 12 MONTHS, WAS THERE A TIME WHEN YOU DID NOT HAVE A STEADY PLACE TO SLEEP OR SLEPT IN A SHELTER (INCLUDING NOW)?: NO

## 2023-05-26 SDOH — ECONOMIC STABILITY: INCOME INSECURITY: HOW HARD IS IT FOR YOU TO PAY FOR THE VERY BASICS LIKE FOOD, HOUSING, MEDICAL CARE, AND HEATING?: NOT HARD AT ALL

## 2023-05-26 SDOH — ECONOMIC STABILITY: FOOD INSECURITY: WITHIN THE PAST 12 MONTHS, THE FOOD YOU BOUGHT JUST DIDN'T LAST AND YOU DIDN'T HAVE MONEY TO GET MORE.: NEVER TRUE

## 2023-05-26 SDOH — ECONOMIC STABILITY: FOOD INSECURITY: WITHIN THE PAST 12 MONTHS, YOU WORRIED THAT YOUR FOOD WOULD RUN OUT BEFORE YOU GOT MONEY TO BUY MORE.: NEVER TRUE

## 2023-05-26 ASSESSMENT — PATIENT HEALTH QUESTIONNAIRE - PHQ9
SUM OF ALL RESPONSES TO PHQ QUESTIONS 1-9: 0
2. FEELING DOWN, DEPRESSED OR HOPELESS: 0
SUM OF ALL RESPONSES TO PHQ9 QUESTIONS 1 & 2: 0
SUM OF ALL RESPONSES TO PHQ QUESTIONS 1-9: 0
SUM OF ALL RESPONSES TO PHQ QUESTIONS 1-9: 0
1. LITTLE INTEREST OR PLEASURE IN DOING THINGS: 0
SUM OF ALL RESPONSES TO PHQ QUESTIONS 1-9: 0

## 2023-05-26 ASSESSMENT — LIFESTYLE VARIABLES
HOW MANY STANDARD DRINKS CONTAINING ALCOHOL DO YOU HAVE ON A TYPICAL DAY: PATIENT DOES NOT DRINK
HOW OFTEN DO YOU HAVE A DRINK CONTAINING ALCOHOL: NEVER

## 2023-05-26 NOTE — PROGRESS NOTES
Medicare Annual Wellness Visit    Mireille Bermudez is here for Medicare AWV    Assessment & Plan   Medicare annual wellness visit, subsequent      Recommendations for Preventive Services Due: see orders and patient instructions/AVS.  Recommended screening schedule for the next 5-10 years is provided to the patient in written form: see Patient Instructions/AVS.     No follow-ups on file. Subjective       Patient's complete Health Risk Assessment and screening values have been reviewed and are found in Flowsheets. The following problems were reviewed today and where indicated follow up appointments were made and/or referrals ordered. Positive Risk Factor Screenings with Interventions:                 Weight and Activity:  Physical Activity: Sufficiently Active    Days of Exercise per Week: 4 days    Minutes of Exercise per Session: 60 min     On average, how many days per week do you engage in moderate to strenuous exercise (like a brisk walk)?: 4 days  Have you lost any weight without trying in the past 3 months?: No  There is no height or weight on file to calculate BMI. (!) Abnormal  Obesity Interventions:  Patient comments: states he probably should lose weight. Patient declines any further evaluation or treatment            Vision Screen:  Do you have difficulty driving, watching TV, or doing any of your daily activities because of your eyesight?: No  Have you had an eye exam within the past year?: (!) No (needs)  No results found. Interventions:   Patient comments: states he does need to make an eye appointment. Patient encouraged to make appointment with their eye specialist                      Objective      Patient-Reported Vitals  No data recorded     Unable to obtain 3 vital signs due to patient not having equipment to take blood pressure/temperature. No Known Allergies  Prior to Visit Medications    Medication Sig Taking?  Authorizing Provider   UNABLE TO FIND 2 times daily

## 2023-05-26 NOTE — PATIENT INSTRUCTIONS
Personalized Preventive Plan for Hari Armas - 5/26/2023  Medicare offers a range of preventive health benefits. Some of the tests and screenings are paid in full while other may be subject to a deductible, co-insurance, and/or copay. Some of these benefits include a comprehensive review of your medical history including lifestyle, illnesses that may run in your family, and various assessments and screenings as appropriate. After reviewing your medical record and screening and assessments performed today your provider may have ordered immunizations, labs, imaging, and/or referrals for you. A list of these orders (if applicable) as well as your Preventive Care list are included within your After Visit Summary for your review. Other Preventive Recommendations:    A preventive eye exam performed by an eye specialist is recommended every 1-2 years to screen for glaucoma; cataracts, macular degeneration, and other eye disorders. A preventive dental visit is recommended every 6 months. Try to get at least 150 minutes of exercise per week or 10,000 steps per day on a pedometer . Order or download the FREE \"Exercise & Physical Activity: Your Everyday Guide\" from [employer]. Call 7-770.345.2941 or search [employer] online. You need 9573-2061 mg of calcium and 8300-1722 IU of vitamin D per day. It is possible to meet your calcium requirement with diet alone, but a vitamin D supplement is usually necessary to meet this goal.  When exposed to the sun, use a sunscreen that protects against both UVA and UVB radiation with an SPF of 30 or greater. Reapply every 2 to 3 hours or after sweating, drying off with a towel, or swimming. Always wear a seat belt when traveling in a car. Always wear a helmet when riding a bicycle or motorcycle.

## 2023-05-28 PROCEDURE — 93296 REM INTERROG EVL PM/IDS: CPT | Performed by: INTERNAL MEDICINE

## 2023-05-28 PROCEDURE — 93294 REM INTERROG EVL PM/LDLS PM: CPT | Performed by: INTERNAL MEDICINE

## 2023-05-30 ENCOUNTER — PROCEDURE VISIT (OUTPATIENT)
Dept: CARDIOLOGY CLINIC | Age: 88
End: 2023-05-30
Payer: MEDICARE

## 2023-05-30 DIAGNOSIS — Z95.0 CARDIAC PACEMAKER IN SITU: ICD-10-CM

## 2023-05-31 DIAGNOSIS — I48.0 PAROXYSMAL ATRIAL FIBRILLATION (HCC): Primary | ICD-10-CM

## 2023-06-08 ENCOUNTER — OFFICE VISIT (OUTPATIENT)
Dept: INTERNAL MEDICINE CLINIC | Age: 88
End: 2023-06-08
Payer: MEDICARE

## 2023-06-08 VITALS
SYSTOLIC BLOOD PRESSURE: 138 MMHG | WEIGHT: 242.4 LBS | OXYGEN SATURATION: 96 % | TEMPERATURE: 97.2 F | BODY MASS INDEX: 34.78 KG/M2 | RESPIRATION RATE: 16 BRPM | DIASTOLIC BLOOD PRESSURE: 84 MMHG | HEART RATE: 76 BPM

## 2023-06-08 DIAGNOSIS — C43.9 MALIGNANT MELANOMA, UNSPECIFIED SITE (HCC): ICD-10-CM

## 2023-06-08 DIAGNOSIS — Z95.0 CARDIAC PACEMAKER: ICD-10-CM

## 2023-06-08 DIAGNOSIS — I10 ESSENTIAL HYPERTENSION: ICD-10-CM

## 2023-06-08 DIAGNOSIS — M17.0 PRIMARY OSTEOARTHRITIS OF BOTH KNEES: ICD-10-CM

## 2023-06-08 DIAGNOSIS — D72.821 MONOCYTOSIS: ICD-10-CM

## 2023-06-08 DIAGNOSIS — I48.0 PAF (PAROXYSMAL ATRIAL FIBRILLATION) (HCC): Primary | ICD-10-CM

## 2023-06-08 DIAGNOSIS — E78.2 MIXED HYPERLIPIDEMIA: ICD-10-CM

## 2023-06-08 DIAGNOSIS — M85.80 OSTEOPENIA, UNSPECIFIED LOCATION: ICD-10-CM

## 2023-06-08 PROCEDURE — 99214 OFFICE O/P EST MOD 30 MIN: CPT | Performed by: INTERNAL MEDICINE

## 2023-06-08 PROCEDURE — 3078F DIAST BP <80 MM HG: CPT | Performed by: INTERNAL MEDICINE

## 2023-06-08 PROCEDURE — 1123F ACP DISCUSS/DSCN MKR DOCD: CPT | Performed by: INTERNAL MEDICINE

## 2023-06-08 PROCEDURE — 3074F SYST BP LT 130 MM HG: CPT | Performed by: INTERNAL MEDICINE

## 2023-06-08 RX ORDER — FLUOROURACIL 50 MG/G
CREAM TOPICAL
COMMUNITY
Start: 2023-06-01

## 2023-06-08 ASSESSMENT — ENCOUNTER SYMPTOMS
SHORTNESS OF BREATH: 0
EYES NEGATIVE: 1
ALLERGIC/IMMUNOLOGIC NEGATIVE: 1
COUGH: 0
WHEEZING: 0
GASTROINTESTINAL NEGATIVE: 1

## 2023-06-08 ASSESSMENT — PATIENT HEALTH QUESTIONNAIRE - PHQ9
SUM OF ALL RESPONSES TO PHQ QUESTIONS 1-9: 0
SUM OF ALL RESPONSES TO PHQ QUESTIONS 1-9: 0
SUM OF ALL RESPONSES TO PHQ9 QUESTIONS 1 & 2: 0
SUM OF ALL RESPONSES TO PHQ QUESTIONS 1-9: 0
1. LITTLE INTEREST OR PLEASURE IN DOING THINGS: 0
2. FEELING DOWN, DEPRESSED OR HOPELESS: 0
SUM OF ALL RESPONSES TO PHQ QUESTIONS 1-9: 0

## 2023-06-08 NOTE — PROGRESS NOTES
distress. No wheezes or rales. ABDOMEN: - Soft and non-tender,no masses  ororganomegaly. EXTREMITIES: - No cyanosis, clubbing, or significant edema. SKIN: Skin is warm and dry. NEUROLOGICAL: - Cranial nerves II through XII are grossly intact. IMPRESSION:    Encounter Diagnoses   Name Primary? PAF (paroxysmal atrial fibrillation) ([facility]) Yes    Cardiac pacemaker     Essential hypertension     Mixed hyperlipidemia     Monocytosis     Osteopenia, unspecified location     Primary osteoarthritis of both knees     Malignant melanoma, unspecified site [facility])        ASSESSMENT/PLAN:    1. PAF (paroxysmal atrial fibrillation) (HCC)  Overview:  Pacemaker check showed 14 hour of AF   Started on Eliquis 2.5 mg bid after discussing with patient   Pros and cons were discussed again   2. 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 Sees Dr. Av Wiggins in  8/3/16  3. Essential hypertension  Overview:  Hypertension in control. Follow low salt diet. Continue current treatment. Patient is on metoprolol tartrate 25 mg bid  and lisinopril with hydrochlorothiazide 20-12.5    4. Mixed hyperlipidemia  Overview:  Hyperlipidemia is stable. Follow low cholesterol diet. Continue current treatment.  -takes lipitor, omega-3 .   5. Monocytosis  Overview:  Pt has monocytosis since 2017  Discussed with patient in detail. Absolute monocytes are normal  Referred to Dr Gian Graves : stable   Dr Gian Graves : in one year   6. Osteopenia, unspecified location  Overview:  -takes Calcium + vitamin D3  2000 units a day. Had fosamax in the past.  -bone density in 2/16 osteoporosis  -5/2021 :  Dexa scan osteopenia     7. Primary osteoarthritis of both knees  Overview:  DJD of both knees. No surgery. Seen Ortho one at [facility] [place] operated by [facility] in the past  Pain has improved and is not bothering him any more. F/u with cardiology  Return to office in 3 months. Mediations reviewed with the patient. Continue current

## 2023-07-24 RX ORDER — LISINOPRIL AND HYDROCHLOROTHIAZIDE 20; 12.5 MG/1; MG/1
1 TABLET ORAL DAILY
Qty: 90 TABLET | Refills: 1 | Status: SHIPPED | OUTPATIENT
Start: 2023-07-24

## 2023-07-26 DIAGNOSIS — I48.0 PAROXYSMAL ATRIAL FIBRILLATION (HCC): ICD-10-CM

## 2023-08-08 ENCOUNTER — OFFICE VISIT (OUTPATIENT)
Dept: CARDIOLOGY CLINIC | Age: 88
End: 2023-08-08
Payer: MEDICARE

## 2023-08-08 VITALS
SYSTOLIC BLOOD PRESSURE: 114 MMHG | DIASTOLIC BLOOD PRESSURE: 66 MMHG | BODY MASS INDEX: 34.5 KG/M2 | HEART RATE: 70 BPM | WEIGHT: 241 LBS | HEIGHT: 70 IN

## 2023-08-08 DIAGNOSIS — Z95.0 CARDIAC PACEMAKER: ICD-10-CM

## 2023-08-08 DIAGNOSIS — N18.30 STAGE 3 CHRONIC KIDNEY DISEASE, UNSPECIFIED WHETHER STAGE 3A OR 3B CKD (HCC): ICD-10-CM

## 2023-08-08 DIAGNOSIS — I49.5 SICK SINUS SYNDROME (HCC): ICD-10-CM

## 2023-08-08 DIAGNOSIS — I10 ESSENTIAL HYPERTENSION: Primary | ICD-10-CM

## 2023-08-08 DIAGNOSIS — I48.0 PAROXYSMAL ATRIAL FIBRILLATION (HCC): ICD-10-CM

## 2023-08-08 DIAGNOSIS — E78.2 MIXED HYPERLIPIDEMIA: ICD-10-CM

## 2023-08-08 DIAGNOSIS — I48.0 PAF (PAROXYSMAL ATRIAL FIBRILLATION) (HCC): ICD-10-CM

## 2023-08-08 PROCEDURE — 1123F ACP DISCUSS/DSCN MKR DOCD: CPT | Performed by: NURSE PRACTITIONER

## 2023-08-08 PROCEDURE — 93000 ELECTROCARDIOGRAM COMPLETE: CPT | Performed by: NURSE PRACTITIONER

## 2023-08-08 PROCEDURE — 99214 OFFICE O/P EST MOD 30 MIN: CPT | Performed by: NURSE PRACTITIONER

## 2023-08-08 PROCEDURE — 3078F DIAST BP <80 MM HG: CPT | Performed by: NURSE PRACTITIONER

## 2023-08-08 PROCEDURE — 3074F SYST BP LT 130 MM HG: CPT | Performed by: NURSE PRACTITIONER

## 2023-08-08 ASSESSMENT — ENCOUNTER SYMPTOMS
ORTHOPNEA: 0
SLEEP DISTURBANCES DUE TO BREATHING: 0
SHORTNESS OF BREATH: 0

## 2023-08-08 NOTE — PROGRESS NOTES
8/8/2023  Primary cardiologist: Dr. Olivier Thomas    CC: Trinidad Price  is an established 82 y.o.  male here for a 6 month follow up      SUBJECTIVE/OBJECTIVE:  Trinidad Price is a 82 y.o. male with a history of hypertension and hyperlipidemia and has chronic kidney disease and has a permanent pacemaker. HPI :   Trinidad Price reports feeling well. Denies any cardiac symptoms. He stays very active for his age and denies any chest pains or shortness of breath or palpitations. Patient is followed regularly by remote monitoring. Review of Systems   Constitutional: Negative for malaise/fatigue. Eyes:  Negative for visual disturbance. Cardiovascular:  Negative for chest pain, claudication, cyanosis, dyspnea on exertion, irregular heartbeat, leg swelling, near-syncope, orthopnea, palpitations, paroxysmal nocturnal dyspnea and syncope. Respiratory:  Negative for shortness of breath and sleep disturbances due to breathing. Neurological:  Negative for focal weakness, light-headedness and weakness. Psychiatric/Behavioral:  Negative for depression. The patient is not nervous/anxious. Vitals:    08/08/23 0830   BP: 114/66   Pulse: 70   Weight: 241 lb (109.3 kg)   Height: 5' 10\" (1.778 m)       Wt Readings from Last 3 Encounters:   08/08/23 241 lb (109.3 kg)   06/08/23 242 lb 6.4 oz (110 kg)   03/02/23 239 lb 3.2 oz (108.5 kg)     There is no height or weight on file to calculate BMI. Physical Exam  Vitals reviewed. Constitutional:       General: He is not in acute distress. Appearance: Normal appearance. He is not ill-appearing. HENT:      Head: Atraumatic. Neck:      Vascular: No carotid bruit. Cardiovascular:      Rate and Rhythm: Normal rate and regular rhythm. Pulses: Normal pulses. Heart sounds: Normal heart sounds. No murmur heard. Pulmonary:      Effort: Pulmonary effort is normal. No respiratory distress. Breath sounds: Normal breath sounds.    Musculoskeletal:         General: No

## 2023-08-10 RX ORDER — ATORVASTATIN CALCIUM 40 MG/1
TABLET, FILM COATED ORAL
Qty: 90 TABLET | Refills: 1 | Status: SHIPPED | OUTPATIENT
Start: 2023-08-10

## 2023-08-14 ENCOUNTER — TELEPHONE (OUTPATIENT)
Dept: CARDIOLOGY CLINIC | Age: 88
End: 2023-08-14

## 2023-08-15 NOTE — TELEPHONE ENCOUNTER
Left message on voice mail for patient to return call regarding Eliquis. Please make sure he knows that eliquis should be 5 mg BID per Dr. Guadalupe Segundo. Please continue to not take ASA.  He may need samples

## 2023-09-11 PROCEDURE — 93296 REM INTERROG EVL PM/IDS: CPT | Performed by: INTERNAL MEDICINE

## 2023-09-11 PROCEDURE — 93294 REM INTERROG EVL PM/LDLS PM: CPT | Performed by: INTERNAL MEDICINE

## 2023-09-14 ENCOUNTER — OFFICE VISIT (OUTPATIENT)
Dept: INTERNAL MEDICINE CLINIC | Age: 88
End: 2023-09-14
Payer: MEDICARE

## 2023-09-14 VITALS
OXYGEN SATURATION: 96 % | RESPIRATION RATE: 16 BRPM | DIASTOLIC BLOOD PRESSURE: 70 MMHG | WEIGHT: 242 LBS | HEART RATE: 67 BPM | SYSTOLIC BLOOD PRESSURE: 124 MMHG | HEIGHT: 70 IN | TEMPERATURE: 97.3 F | BODY MASS INDEX: 34.65 KG/M2

## 2023-09-14 DIAGNOSIS — Z95.0 CARDIAC PACEMAKER: ICD-10-CM

## 2023-09-14 DIAGNOSIS — I48.0 PAF (PAROXYSMAL ATRIAL FIBRILLATION) (HCC): ICD-10-CM

## 2023-09-14 DIAGNOSIS — I10 ESSENTIAL HYPERTENSION: Primary | ICD-10-CM

## 2023-09-14 DIAGNOSIS — M17.0 PRIMARY OSTEOARTHRITIS OF BOTH KNEES: ICD-10-CM

## 2023-09-14 DIAGNOSIS — E78.2 MIXED HYPERLIPIDEMIA: ICD-10-CM

## 2023-09-14 PROCEDURE — 99214 OFFICE O/P EST MOD 30 MIN: CPT | Performed by: INTERNAL MEDICINE

## 2023-09-14 PROCEDURE — 1123F ACP DISCUSS/DSCN MKR DOCD: CPT | Performed by: INTERNAL MEDICINE

## 2023-09-14 PROCEDURE — 3078F DIAST BP <80 MM HG: CPT | Performed by: INTERNAL MEDICINE

## 2023-09-14 PROCEDURE — 3074F SYST BP LT 130 MM HG: CPT | Performed by: INTERNAL MEDICINE

## 2023-09-18 ENCOUNTER — PROCEDURE VISIT (OUTPATIENT)
Dept: CARDIOLOGY CLINIC | Age: 88
End: 2023-09-18
Payer: MEDICARE

## 2023-09-18 DIAGNOSIS — Z95.0 CARDIAC PACEMAKER IN SITU: ICD-10-CM

## 2023-12-10 PROCEDURE — 93296 REM INTERROG EVL PM/IDS: CPT | Performed by: INTERNAL MEDICINE

## 2023-12-10 PROCEDURE — 93294 REM INTERROG EVL PM/LDLS PM: CPT | Performed by: INTERNAL MEDICINE

## 2023-12-12 ENCOUNTER — PROCEDURE VISIT (OUTPATIENT)
Dept: CARDIOLOGY CLINIC | Age: 88
End: 2023-12-12
Payer: MEDICARE

## 2023-12-12 DIAGNOSIS — Z95.0 CARDIAC PACEMAKER IN SITU: Primary | ICD-10-CM

## 2023-12-28 ENCOUNTER — HOSPITAL ENCOUNTER (OUTPATIENT)
Age: 88
Discharge: HOME | End: 2023-12-28
Payer: MEDICARE

## 2023-12-28 DIAGNOSIS — E78.2 MIXED HYPERLIPIDEMIA: ICD-10-CM

## 2023-12-28 LAB
ALBUMIN SERPL-MCNC: 4 GM/DL (ref 3.4–5)
ALP BLD-CCNC: 61 IU/L (ref 40–129)
ALT SERPL-CCNC: 36 U/L (ref 10–40)
ANION GAP SERPL CALCULATED.3IONS-SCNC: 10 MMOL/L (ref 7–16)
AST SERPL-CCNC: 29 IU/L (ref 15–37)
BILIRUB SERPL-MCNC: 0.5 MG/DL (ref 0–1)
BUN SERPL-MCNC: 23 MG/DL (ref 6–23)
CALCIUM SERPL-MCNC: 9.8 MG/DL (ref 8.3–10.6)
CHLORIDE BLD-SCNC: 102 MMOL/L (ref 99–110)
CHOLESTEROL, FASTING: 183 MG/DL
CO2: 28 MMOL/L (ref 21–32)
CREAT SERPL-MCNC: 0.9 MG/DL (ref 0.9–1.3)
GFR SERPL CREATININE-BSD FRML MDRD: >60 ML/MIN/1.73M2
GLUCOSE SERPL-MCNC: 94 MG/DL (ref 70–99)
HDLC SERPL-MCNC: 41 MG/DL
LDLC SERPL CALC-MCNC: 88 MG/DL
POTASSIUM SERPL-SCNC: 4.5 MMOL/L (ref 3.5–5.1)
SODIUM BLD-SCNC: 140 MMOL/L (ref 135–145)
TOTAL PROTEIN: 6.5 GM/DL (ref 6.4–8.2)
TRIGLYCERIDE, FASTING: 271 MG/DL

## 2023-12-28 PROCEDURE — 80061 LIPID PANEL: CPT

## 2023-12-28 PROCEDURE — 36415 COLL VENOUS BLD VENIPUNCTURE: CPT

## 2023-12-28 PROCEDURE — 80053 COMPREHEN METABOLIC PANEL: CPT

## 2024-01-09 ENCOUNTER — OFFICE VISIT (OUTPATIENT)
Dept: ONCOLOGY | Age: 89
End: 2024-01-09
Payer: MEDICARE

## 2024-01-09 ENCOUNTER — OFFICE VISIT (OUTPATIENT)
Dept: INTERNAL MEDICINE CLINIC | Age: 89
End: 2024-01-09
Payer: COMMERCIAL

## 2024-01-09 ENCOUNTER — HOSPITAL ENCOUNTER (OUTPATIENT)
Dept: INFUSION THERAPY | Age: 89
Discharge: HOME | End: 2024-01-09
Payer: COMMERCIAL

## 2024-01-09 VITALS
TEMPERATURE: 97.8 F | OXYGEN SATURATION: 98 % | HEART RATE: 87 BPM | DIASTOLIC BLOOD PRESSURE: 69 MMHG | SYSTOLIC BLOOD PRESSURE: 127 MMHG

## 2024-01-09 VITALS
OXYGEN SATURATION: 94 % | DIASTOLIC BLOOD PRESSURE: 70 MMHG | BODY MASS INDEX: 34.93 KG/M2 | HEIGHT: 70 IN | WEIGHT: 244 LBS | SYSTOLIC BLOOD PRESSURE: 125 MMHG | HEART RATE: 87 BPM

## 2024-01-09 DIAGNOSIS — M85.80 OSTEOPENIA, UNSPECIFIED LOCATION: ICD-10-CM

## 2024-01-09 DIAGNOSIS — N18.30 STAGE 3 CHRONIC KIDNEY DISEASE, UNSPECIFIED WHETHER STAGE 3A OR 3B CKD (HCC): ICD-10-CM

## 2024-01-09 DIAGNOSIS — D72.821 MONOCYTOSIS: Primary | ICD-10-CM

## 2024-01-09 DIAGNOSIS — I10 ESSENTIAL HYPERTENSION: Primary | ICD-10-CM

## 2024-01-09 DIAGNOSIS — E78.2 MIXED HYPERLIPIDEMIA: ICD-10-CM

## 2024-01-09 DIAGNOSIS — E66.01 SEVERE OBESITY (BMI 35.0-39.9) WITH COMORBIDITY (HCC): ICD-10-CM

## 2024-01-09 DIAGNOSIS — D72.821 MONOCYTOSIS: ICD-10-CM

## 2024-01-09 DIAGNOSIS — Z95.0 CARDIAC PACEMAKER: ICD-10-CM

## 2024-01-09 DIAGNOSIS — C43.9 MALIGNANT MELANOMA, UNSPECIFIED SITE (HCC): ICD-10-CM

## 2024-01-09 DIAGNOSIS — I48.0 PAF (PAROXYSMAL ATRIAL FIBRILLATION) (HCC): ICD-10-CM

## 2024-01-09 LAB
BASOPHILS ABSOLUTE: 0.1 K/CU MM
BASOPHILS RELATIVE PERCENT: 0.5 % (ref 0–1)
DIFFERENTIAL TYPE: ABNORMAL
EOSINOPHILS ABSOLUTE: 0.2 K/CU MM
EOSINOPHILS RELATIVE PERCENT: 2.2 % (ref 0–3)
HCT VFR BLD CALC: 45.3 % (ref 42–52)
HEMOGLOBIN: 15 GM/DL (ref 13.5–18)
IMMATURE NEUTROPHIL %: 0.3 % (ref 0–0.43)
LYMPHOCYTES ABSOLUTE: 3.2 K/CU MM
LYMPHOCYTES RELATIVE PERCENT: 34.7 % (ref 24–44)
MCH RBC QN AUTO: 32.5 PG (ref 27–31)
MCHC RBC AUTO-ENTMCNC: 33.1 % (ref 32–36)
MCV RBC AUTO: 98.1 FL (ref 78–100)
MONOCYTES ABSOLUTE: 1.2 K/CU MM
MONOCYTES RELATIVE PERCENT: 13.3 % (ref 0–4)
NUCLEATED RBC %: 0 %
PDW BLD-RTO: 12.7 % (ref 11.7–14.9)
PLATELET # BLD: 189 K/CU MM (ref 140–440)
PMV BLD AUTO: 10.4 FL (ref 7.5–11.1)
RBC # BLD: 4.62 M/CU MM (ref 4.6–6.2)
SEGMENTED NEUTROPHILS ABSOLUTE COUNT: 4.5 K/CU MM
SEGMENTED NEUTROPHILS RELATIVE PERCENT: 49 % (ref 36–66)
TOTAL IMMATURE NEUTOROPHIL: 0.03 K/CU MM
TOTAL NUCLEATED RBC: 0 K/CU MM
WBC # BLD: 9.2 K/CU MM (ref 4–10.5)

## 2024-01-09 PROCEDURE — 36415 COLL VENOUS BLD VENIPUNCTURE: CPT

## 2024-01-09 PROCEDURE — 99214 OFFICE O/P EST MOD 30 MIN: CPT | Performed by: INTERNAL MEDICINE

## 2024-01-09 PROCEDURE — 1123F ACP DISCUSS/DSCN MKR DOCD: CPT | Performed by: INTERNAL MEDICINE

## 2024-01-09 PROCEDURE — 85025 COMPLETE CBC W/AUTO DIFF WBC: CPT

## 2024-01-09 PROCEDURE — 99211 OFF/OP EST MAY X REQ PHY/QHP: CPT

## 2024-01-09 PROCEDURE — 99213 OFFICE O/P EST LOW 20 MIN: CPT | Performed by: INTERNAL MEDICINE

## 2024-01-09 ASSESSMENT — PATIENT HEALTH QUESTIONNAIRE - PHQ9
SUM OF ALL RESPONSES TO PHQ QUESTIONS 1-9: 0
SUM OF ALL RESPONSES TO PHQ9 QUESTIONS 1 & 2: 0
SUM OF ALL RESPONSES TO PHQ QUESTIONS 1-9: 0
SUM OF ALL RESPONSES TO PHQ QUESTIONS 1-9: 0
1. LITTLE INTEREST OR PLEASURE IN DOING THINGS: 0
2. FEELING DOWN, DEPRESSED OR HOPELESS: 0
SUM OF ALL RESPONSES TO PHQ QUESTIONS 1-9: 0

## 2024-01-09 ASSESSMENT — ENCOUNTER SYMPTOMS
EYES NEGATIVE: 1
GASTROINTESTINAL NEGATIVE: 1
ALLERGIC/IMMUNOLOGIC NEGATIVE: 1
RESPIRATORY NEGATIVE: 1

## 2024-01-09 NOTE — PROGRESS NOTES
Patient Name:  Bernard Syed  Patient :  1935  Patient MRN:  [id number]     Primary Oncologist: Vinod Negrete MD  Referring Provider: Yesica Perez MD     Date of Service: 2024      Chief Complaint:    Chief Complaint   Patient presents with    Follow-up     Patient Active Problem List:     Mixed hyperlipidemia     Cardiac pacemaker     Essential hypertension     Primary osteoarthritis of both knees     Melanoma (HCC) of neck and upper back     Stage 3 chronic kidney disease (HCC)     Monocytosis     Age-related osteoporosis without current pathological fracture    HPI:   Bernard Syed is a 88-year-old very pleasant gentleman with medical history significant for hypertension, hyperlipidemia, Mobitz type II AV block, status post pacemaker placement in 2016, chronic kidney disease, history of melanoma and osteoarthritis, initially referred to me on May 17, 2021 for evaluation of his relative monocytosis.    He stated that he has been having relative monocytosis since 2017 and it has been quite stable over the course.  His absolute monocyte count has always been normal. He does not have anemia, neutropenia or thrombocytopenia. Dr. Irving Perez has been monitoring it closely.      Because of persistent relative monocytosis, he was subsequently referred to me for further evaluation.    His absolute monocyte count has always been normal. He does not have anemia, neutropenia or thrombocytopenia. Dr. Irving Perez has been monitoring it closely.      I agree with Dr. Perez and I recommend to continue with close observation. Since his absolute monocytosis is within normal range, I believe it is due to reactive process. No additional investigation needed at this moment.     On 2024, he presented to me for follow-up.  I have been following him for relative monocytosis and he has been under close observation.    I recognize that he has stable absolute monocyte count (1200/cumm) on today blood test.  He

## 2024-01-09 NOTE — PROGRESS NOTES
MA Rooming Questions  Patient: Bernard Syed  MRN: [id number]    Date: 1/9/2024        1. Do you have any new issues?   no         2. Do you need any refills on medications?    no    3. Have you had any imaging done since your last visit?   no    4. Have you been hospitalized or seen in the emergency room since your last visit here?   no    5. Did the patient have a depression screening completed today? No    PHQ-9 Total Score: 0 (1/9/2024 12:52 PM)       PHQ-9 Given to (if applicable):               PHQ-9 Score (if applicable):                     [] Positive     []  Negative              Does question #9 need addressed (if applicable)                     [] Yes    []  No               Es Neal MA

## 2024-01-09 NOTE — PROGRESS NOTES
Bernard Syed  Patient's  is 1935  Seen in office on 2024      SUBJECTIVE:  Bernard díaz 88 y.o.year old male presents today   Chief Complaint   Patient presents with    3 Month Follow-Up     Patient is here for follow-up of hypertension, hyperlipidemia, paroxysmal atrial fibrillation and arthritis    Patient has hypertension. Taking medications No headaches, no chest pain, no palpitations and no dizziness.  Patient has hyperlipidemia. Taking medications. No abdominal pain, no nausea or vomiting. No myalgias.  Pt has AF s/p ablation : cardiology increased Eliquis to 5 mg bid   Patient has arthritis of the knees the pain has improved and is not bothering him much.  Taking medications regularly. No side effects noted.    Lab results reviewed     Review of Systems   Constitutional: Negative.    HENT: Negative.     Eyes: Negative.    Respiratory: Negative.     Cardiovascular: Negative.    Gastrointestinal: Negative.    Endocrine: Negative.    Genitourinary: Negative.    Musculoskeletal: Negative.    Skin: Negative.    Allergic/Immunologic: Negative.    Neurological: Negative.    Hematological: Negative.    Psychiatric/Behavioral: Negative.         OBJECTIVE: /70   Pulse 87   Ht 1.778 m (5' 10\")   Wt 110.7 kg (244 lb)   SpO2 94%   BMI 35.01 kg/m²     Wt Readings from Last 3 Encounters:   24 110.7 kg (244 lb)   23 109.8 kg (242 lb)   23 109.3 kg (241 lb)      GENERAL: - Alert, oriented, pleasant, in no apparent distress.    HEENT: - Conjunctiva pink, no scleral icterus. ENT clear.  NECK: -Supple.  No jugular venous distention noted. No masses felt,  CARDIOVASCULAR: - Normal S1 and S2  pacemaker  PULMONARY: - No respiratory distress.  No wheezes or rales.    ABDOMEN: - Soft and non-tender,no masses  ororganomegaly.  EXTREMITIES: - No cyanosis, clubbing, or significant edema.  SKIN: Skin is warm and dry.   NEUROLOGICAL: - Cranial nerves II through XII are grossly intact.

## 2024-01-10 ENCOUNTER — CLINICAL DOCUMENTATION (OUTPATIENT)
Dept: ONCOLOGY | Age: 89
End: 2024-01-10

## 2024-01-10 NOTE — PROGRESS NOTES
Lab results from 01/09/2024 reviewed. Monocyte counts are stable. No anemia or thrombocytopenia. Per physician, will continue with observation and follow up in 1 year. Attempted to call patient @ [phone] to notify; however, no answer. Unable to leave .

## 2024-01-16 RX ORDER — LISINOPRIL AND HYDROCHLOROTHIAZIDE 20; 12.5 MG/1; MG/1
1 TABLET ORAL DAILY
Qty: 90 TABLET | Refills: 1 | Status: SHIPPED | OUTPATIENT
Start: 2024-01-16

## 2024-01-18 ENCOUNTER — CLINICAL DOCUMENTATION (OUTPATIENT)
Dept: ONCOLOGY | Age: 89
End: 2024-01-18

## 2024-01-18 NOTE — PROGRESS NOTES
Attempted to call patient @ [phone] to notify of lab results from 01/09/2024 (monocytes stable, no anemia or thrombocytopenia); however, no answer. Plan is to continue with OBS and have patient f/u in 1 year per physician. Unable to leave .

## 2024-02-01 RX ORDER — ATORVASTATIN CALCIUM 40 MG/1
40 TABLET, FILM COATED ORAL DAILY
Qty: 90 TABLET | Refills: 1 | Status: SHIPPED | OUTPATIENT
Start: 2024-02-01

## 2024-02-06 ENCOUNTER — OFFICE VISIT (OUTPATIENT)
Dept: CARDIOLOGY CLINIC | Age: 89
End: 2024-02-06
Payer: COMMERCIAL

## 2024-02-06 VITALS
BODY MASS INDEX: 34.79 KG/M2 | DIASTOLIC BLOOD PRESSURE: 72 MMHG | SYSTOLIC BLOOD PRESSURE: 136 MMHG | WEIGHT: 243 LBS | HEART RATE: 60 BPM | HEIGHT: 70 IN

## 2024-02-06 DIAGNOSIS — E78.2 MIXED HYPERLIPIDEMIA: ICD-10-CM

## 2024-02-06 DIAGNOSIS — Z95.0 CARDIAC PACEMAKER: Primary | ICD-10-CM

## 2024-02-06 DIAGNOSIS — I10 ESSENTIAL HYPERTENSION: ICD-10-CM

## 2024-02-06 DIAGNOSIS — I48.0 PAF (PAROXYSMAL ATRIAL FIBRILLATION) (HCC): ICD-10-CM

## 2024-02-06 DIAGNOSIS — D68.69 SECONDARY HYPERCOAGULABLE STATE (HCC): ICD-10-CM

## 2024-02-06 PROBLEM — N18.30 STAGE 3 CHRONIC KIDNEY DISEASE (HCC): Status: RESOLVED | Noted: 2018-08-28 | Resolved: 2024-02-06

## 2024-02-06 PROCEDURE — 1123F ACP DISCUSS/DSCN MKR DOCD: CPT | Performed by: INTERNAL MEDICINE

## 2024-02-06 PROCEDURE — 99214 OFFICE O/P EST MOD 30 MIN: CPT | Performed by: INTERNAL MEDICINE

## 2024-02-06 NOTE — ASSESSMENT & PLAN NOTE
Continue with Eliquis 5 mg bid since renal and hepatic function is normal and weight is > 65 kg.

## 2024-02-06 NOTE — PROGRESS NOTES
Arthritis in his sister, sister, sister, and sister; Breast Cancer in his sister; Cancer in his sister, sister, and sister; Emphysema in his father; Heart Disease in his brother; High Blood Pressure in his mother; No Known Problems in his brother; Osteoarthritis in his sister; Other in his brother.  ALLERGIES:  Patient has no known allergies.  Prior to Admission medications    Medication Sig Start Date End Date Taking? Authorizing Provider   apixaban (ELIQUIS) 5 MG TABS tablet Take 1 tablet by mouth 2 times daily 2/6/24  Yes Lidia Hoang MD   atorvastatin (LIPITOR) 40 MG tablet Take 1 tablet by mouth daily 2/1/24  Yes Yesica Perez MD   lisinopril-hydroCHLOROthiazide (PRINZIDE;ZESTORETIC) 20-12.5 MG per tablet TAKE 1 TABLET BY MOUTH DAILY 1/16/24  Yes Yesica Perez MD   metoprolol tartrate (LOPRESSOR) 25 MG tablet TAKE 1 TABLET BY MOUTH 2 TIMES DAILY 1/16/24  Yes Yesica Perez MD   fluorouracil (EFUDEX) 5 % cream Using once a day every other day for 6 weeks 6/1/23  Yes Laci Alba MD   UNABLE TO FIND 2 times daily Indications: BEET CHEWS, 1 IN A.M., 1 IN P.M.   Yes Laci Alba MD   Omega-3 Fatty Acids (FISH OIL) 1000 MG CPDR Take 1 capsule by mouth 2 times daily 2/13/17  Yes Yesica Perez MD   Cholecalciferol (VITAMIN D3) 2000 UNITS CAPS Take 1 capsule by mouth daily   Yes Laci Alba MD   Calcium Citrate (CITRACAL PO) Take 2 tablets by mouth daily    Yes Laci Alba MD   Multiple Vitamin (MULTIVITAMIN PO) Take  by mouth daily.   Yes Laci Alba MD   GLUCOSAMINE HCL Take 1 tablet by mouth 2 times daily   Yes Laci Alba MD     Wt Readings from Last 3 Encounters:   02/06/24 110.2 kg (243 lb)   01/09/24 110.7 kg (244 lb)   09/14/23 109.8 kg (242 lb)     Temp Readings from Last 3 Encounters:   01/09/24 97.8 °F (36.6 °C) (Infrared)   09/14/23 97.3 °F (36.3 °C) (Temporal)   06/08/23 97.2 °F (36.2 °C) (Temporal)     BP Readings from Last 3 Encounters:

## 2024-02-06 NOTE — ASSESSMENT & PLAN NOTE
[id number] Score for Atrial Fibrillation Stroke Risk is 3 and patient is anticoagulated for primary prevention of  embolic score stroke with Eliquis.  He has normal renal function and liver function and he is overweight needs to continue Eliquis 5 mg twice a day.

## 2024-02-06 NOTE — PATIENT INSTRUCTIONS
Continue current cardiovascular medications which have been reviewed and discussed individually with you.  Counseled for 30 minutes a day of moderate intensity aerobic exercise like activity.  Continue device check as per care link schedule.  Appropriate prescriptions if needed on this visit are addressed. After visit summery is provided.  Questions answered and patient verbalizes understanding. Follow up in 12 months with ECG,  sooner if needed.

## 2024-03-18 PROCEDURE — 93294 REM INTERROG EVL PM/LDLS PM: CPT | Performed by: INTERNAL MEDICINE

## 2024-03-18 PROCEDURE — 93296 REM INTERROG EVL PM/IDS: CPT | Performed by: INTERNAL MEDICINE

## 2024-03-19 ENCOUNTER — PROCEDURE VISIT (OUTPATIENT)
Dept: CARDIOLOGY CLINIC | Age: 89
End: 2024-03-19
Payer: COMMERCIAL

## 2024-03-19 DIAGNOSIS — Z95.0 CARDIAC PACEMAKER IN SITU: ICD-10-CM

## 2024-04-24 RX ORDER — LISINOPRIL AND HYDROCHLOROTHIAZIDE 20; 12.5 MG/1; MG/1
1 TABLET ORAL DAILY
Qty: 90 TABLET | Refills: 1 | Status: SHIPPED | OUTPATIENT
Start: 2024-04-24

## 2024-04-24 RX ORDER — ATORVASTATIN CALCIUM 40 MG/1
40 TABLET, FILM COATED ORAL DAILY
Qty: 90 TABLET | Refills: 1 | Status: SHIPPED | OUTPATIENT
Start: 2024-04-24

## 2024-04-30 ENCOUNTER — OFFICE VISIT (OUTPATIENT)
Age: 89
End: 2024-04-30
Payer: COMMERCIAL

## 2024-04-30 VITALS
WEIGHT: 246 LBS | HEART RATE: 78 BPM | DIASTOLIC BLOOD PRESSURE: 72 MMHG | OXYGEN SATURATION: 94 % | TEMPERATURE: 97.1 F | SYSTOLIC BLOOD PRESSURE: 130 MMHG | BODY MASS INDEX: 35.3 KG/M2 | RESPIRATION RATE: 16 BRPM

## 2024-04-30 DIAGNOSIS — Z95.0 CARDIAC PACEMAKER: ICD-10-CM

## 2024-04-30 DIAGNOSIS — C43.9 MALIGNANT MELANOMA, UNSPECIFIED SITE (HCC): ICD-10-CM

## 2024-04-30 DIAGNOSIS — I10 ESSENTIAL HYPERTENSION: Primary | ICD-10-CM

## 2024-04-30 DIAGNOSIS — I48.0 PAF (PAROXYSMAL ATRIAL FIBRILLATION) (HCC): ICD-10-CM

## 2024-04-30 DIAGNOSIS — E78.2 MIXED HYPERLIPIDEMIA: ICD-10-CM

## 2024-04-30 DIAGNOSIS — M85.80 OSTEOPENIA, UNSPECIFIED LOCATION: ICD-10-CM

## 2024-04-30 DIAGNOSIS — D68.69 SECONDARY HYPERCOAGULABLE STATE (HCC): ICD-10-CM

## 2024-04-30 DIAGNOSIS — D72.821 MONOCYTOSIS: ICD-10-CM

## 2024-04-30 PROCEDURE — 1123F ACP DISCUSS/DSCN MKR DOCD: CPT | Performed by: INTERNAL MEDICINE

## 2024-04-30 PROCEDURE — 99214 OFFICE O/P EST MOD 30 MIN: CPT | Performed by: INTERNAL MEDICINE

## 2024-04-30 SDOH — ECONOMIC STABILITY: FOOD INSECURITY: WITHIN THE PAST 12 MONTHS, YOU WORRIED THAT YOUR FOOD WOULD RUN OUT BEFORE YOU GOT MONEY TO BUY MORE.: NEVER TRUE

## 2024-04-30 SDOH — ECONOMIC STABILITY: FOOD INSECURITY: WITHIN THE PAST 12 MONTHS, THE FOOD YOU BOUGHT JUST DIDN'T LAST AND YOU DIDN'T HAVE MONEY TO GET MORE.: NEVER TRUE

## 2024-04-30 SDOH — ECONOMIC STABILITY: INCOME INSECURITY: HOW HARD IS IT FOR YOU TO PAY FOR THE VERY BASICS LIKE FOOD, HOUSING, MEDICAL CARE, AND HEATING?: NOT HARD AT ALL

## 2024-04-30 ASSESSMENT — ENCOUNTER SYMPTOMS
ALLERGIC/IMMUNOLOGIC NEGATIVE: 1
GASTROINTESTINAL NEGATIVE: 1
SHORTNESS OF BREATH: 0
RESPIRATORY NEGATIVE: 1
WHEEZING: 0
COUGH: 0
EYES NEGATIVE: 1

## 2024-04-30 ASSESSMENT — PATIENT HEALTH QUESTIONNAIRE - PHQ9
SUM OF ALL RESPONSES TO PHQ9 QUESTIONS 1 & 2: 0
SUM OF ALL RESPONSES TO PHQ QUESTIONS 1-9: 0
2. FEELING DOWN, DEPRESSED OR HOPELESS: NOT AT ALL
SUM OF ALL RESPONSES TO PHQ QUESTIONS 1-9: 0
SUM OF ALL RESPONSES TO PHQ QUESTIONS 1-9: 0
1. LITTLE INTEREST OR PLEASURE IN DOING THINGS: NOT AT ALL
SUM OF ALL RESPONSES TO PHQ QUESTIONS 1-9: 0

## 2024-04-30 NOTE — PROGRESS NOTES
Bernard Syed  Patient's  is 1935  Seen in office on 2024      SUBJECTIVE:  Bernard díaz 88 y.o.year old male presents today   Chief Complaint   Patient presents with    3 Month Follow-Up     Pt is here for f/u of HTn HLD osteopenia , monocytosis , PAF    Patient has hypertension. Taking medications No headaches, no chest pain, no palpitations and no dizziness.  Patient has hyperlipidemia. Taking medications. No abdominal pain, no nausea or vomiting. No myalgias.  Pt sees dermatologist for melanoma f/u .he is doing well  Pt sees Dr Negrete for monocytosis and is stable.   Pt has PAF and is on eliquis . No bleeding . No bruising. No falls.   Taking medications regularly. No side effects noted.    Review of Systems   Constitutional: Negative.    HENT: Negative.     Eyes: Negative.    Respiratory: Negative.  Negative for cough, shortness of breath and wheezing.    Cardiovascular:  Negative for chest pain, palpitations and leg swelling.   Gastrointestinal: Negative.    Endocrine: Negative.    Genitourinary: Negative.    Musculoskeletal: Negative.    Skin: Negative.    Allergic/Immunologic: Negative.    Hematological: Negative.    Psychiatric/Behavioral: Negative.         OBJECTIVE: /72   Pulse 78   Temp 97.1 °F (36.2 °C) (Temporal)   Resp 16   Wt 111.6 kg (246 lb)   SpO2 94%   BMI 35.30 kg/m²     Wt Readings from Last 3 Encounters:   24 111.6 kg (246 lb)   24 110.2 kg (243 lb)   24 110.7 kg (244 lb)      GENERAL: - Alert, oriented, pleasant, in no apparent distress.    HEENT: - Conjunctiva pink, no scleral icterus. ENT clear.  NECK: -Supple.  No jugular venous distention noted. No masses felt,  CARDIOVASCULAR: - Normal S1 and S2    PULMONARY: - No respiratory distress.  No wheezes or rales.    ABDOMEN: - Soft and non-tender,no masses  ororganomegaly.  EXTREMITIES: - No cyanosis, clubbing, or significant edema.  SKIN: Skin is warm and dry.   NEUROLOGICAL: - Cranial nerves II

## 2024-06-04 SDOH — HEALTH STABILITY: PHYSICAL HEALTH: ON AVERAGE, HOW MANY DAYS PER WEEK DO YOU ENGAGE IN MODERATE TO STRENUOUS EXERCISE (LIKE A BRISK WALK)?: 5 DAYS

## 2024-06-04 SDOH — HEALTH STABILITY: PHYSICAL HEALTH: ON AVERAGE, HOW MANY MINUTES DO YOU ENGAGE IN EXERCISE AT THIS LEVEL?: 150+ MIN

## 2024-06-04 ASSESSMENT — LIFESTYLE VARIABLES
HOW OFTEN DO YOU HAVE SIX OR MORE DRINKS ON ONE OCCASION: 1
HOW OFTEN DO YOU HAVE A DRINK CONTAINING ALCOHOL: NEVER
HOW OFTEN DO YOU HAVE A DRINK CONTAINING ALCOHOL: 1
HOW MANY STANDARD DRINKS CONTAINING ALCOHOL DO YOU HAVE ON A TYPICAL DAY: PATIENT DOES NOT DRINK
HOW MANY STANDARD DRINKS CONTAINING ALCOHOL DO YOU HAVE ON A TYPICAL DAY: 0

## 2024-06-04 ASSESSMENT — PATIENT HEALTH QUESTIONNAIRE - PHQ9
2. FEELING DOWN, DEPRESSED OR HOPELESS: NOT AT ALL
SUM OF ALL RESPONSES TO PHQ9 QUESTIONS 1 & 2: 0
SUM OF ALL RESPONSES TO PHQ QUESTIONS 1-9: 0
SUM OF ALL RESPONSES TO PHQ QUESTIONS 1-9: 0
1. LITTLE INTEREST OR PLEASURE IN DOING THINGS: NOT AT ALL
SUM OF ALL RESPONSES TO PHQ QUESTIONS 1-9: 0
SUM OF ALL RESPONSES TO PHQ QUESTIONS 1-9: 0

## 2024-06-07 ENCOUNTER — TELEMEDICINE (OUTPATIENT)
Age: 89
End: 2024-06-07
Payer: MEDICARE

## 2024-06-07 DIAGNOSIS — Z00.00 MEDICARE ANNUAL WELLNESS VISIT, SUBSEQUENT: Primary | ICD-10-CM

## 2024-06-07 PROCEDURE — G0439 PPPS, SUBSEQ VISIT: HCPCS | Performed by: INTERNAL MEDICINE

## 2024-06-07 PROCEDURE — 1123F ACP DISCUSS/DSCN MKR DOCD: CPT | Performed by: INTERNAL MEDICINE

## 2024-06-07 ASSESSMENT — PATIENT HEALTH QUESTIONNAIRE - PHQ9
SUM OF ALL RESPONSES TO PHQ9 QUESTIONS 1 & 2: 0
SUM OF ALL RESPONSES TO PHQ QUESTIONS 1-9: 0
1. LITTLE INTEREST OR PLEASURE IN DOING THINGS: NOT AT ALL
SUM OF ALL RESPONSES TO PHQ QUESTIONS 1-9: 0
SUM OF ALL RESPONSES TO PHQ QUESTIONS 1-9: 0
2. FEELING DOWN, DEPRESSED OR HOPELESS: NOT AT ALL
SUM OF ALL RESPONSES TO PHQ QUESTIONS 1-9: 0

## 2024-06-07 NOTE — PATIENT INSTRUCTIONS
Personalized Preventive Plan for Bernard Syed - 6/7/2024  Medicare offers a range of preventive health benefits. Some of the tests and screenings are paid in full while other may be subject to a deductible, co-insurance, and/or copay.    Some of these benefits include a comprehensive review of your medical history including lifestyle, illnesses that may run in your family, and various assessments and screenings as appropriate.    After reviewing your medical record and screening and assessments performed today your provider may have ordered immunizations, labs, imaging, and/or referrals for you.  A list of these orders (if applicable) as well as your Preventive Care list are included within your After Visit Summary for your review.    Other Preventive Recommendations:    A preventive eye exam performed by an eye specialist is recommended every 1-2 years to screen for glaucoma; cataracts, macular degeneration, and other eye disorders.  A preventive dental visit is recommended every 6 months.  Try to get at least 150 minutes of exercise per week or 10,000 steps per day on a pedometer .  Order or download the FREE \"Exercise & Physical Activity: Your Everyday Guide\" from [employer]. Call 1-686.507.4816 or search [employer] online.  You need 3831-5227 mg of calcium and 1768-5807 IU of vitamin D per day. It is possible to meet your calcium requirement with diet alone, but a vitamin D supplement is usually necessary to meet this goal.  When exposed to the sun, use a sunscreen that protects against both UVA and UVB radiation with an SPF of 30 or greater. Reapply every 2 to 3 hours or after sweating, drying off with a towel, or swimming.  Always wear a seat belt when traveling in a car. Always wear a helmet when riding a bicycle or motorcycle.

## 2024-06-07 NOTE — PROGRESS NOTES
Medicare Annual Wellness Visit    Bernard Syed is here for Medicare AWV    Assessment & Plan   Medicare annual wellness visit, subsequent  Recommendations for Preventive Services Due: see orders and patient instructions/AVS.  Recommended screening schedule for the next 5-10 years is provided to the patient in written form: see Patient Instructions/AVS.     No follow-ups on file.     Subjective       Patient's complete Health Risk Assessment and screening values have been reviewed and are found in Flowsheets. The following problems were reviewed today and where indicated follow up appointments were made and/or referrals ordered.    Positive Risk Factor Screenings with Interventions:                Activity, Diet, and Weight:  On average, how many days per week do you engage in moderate to strenuous exercise (like a brisk walk)?: 5 days  On average, how many minutes do you engage in exercise at this level?: 150+ min    Do you eat balanced/healthy meals regularly?: Yes    There is no height or weight on file to calculate BMI. (!) Abnormal    Obesity Interventions:  Patient declines any further evaluation or treatment              Vision Screen:  Do you have difficulty driving, watching TV, or doing any of your daily activities because of your eyesight?: No  Have you had an eye exam within the past year?: (!) No (needs)  No results found.    Interventions:   Patient encouraged to make appointment with their eye specialist                    Objective      Patient-Reported Vitals  No data recorded     Unable to obtain 3 vital signs due to patient not having equipment to take blood pressure/temperature.           No Known Allergies  Prior to Visit Medications    Medication Sig Taking? Authorizing Provider   lisinopril-hydroCHLOROthiazide (PRINZIDE;ZESTORETIC) 20-12.5 MG per tablet Take 1 tablet by mouth daily Yes Yesica Perez MD   metoprolol tartrate (LOPRESSOR) 25 MG tablet Take 1 tablet by mouth 2 times daily Yes

## 2024-07-10 ENCOUNTER — HOSPITAL ENCOUNTER (OUTPATIENT)
Age: 89
Discharge: HOME | End: 2024-07-10
Payer: MEDICARE

## 2024-07-10 DIAGNOSIS — E78.2 MIXED HYPERLIPIDEMIA: ICD-10-CM

## 2024-07-10 LAB
ALBUMIN SERPL-MCNC: 4 GM/DL (ref 3.4–5)
ALP BLD-CCNC: 57 IU/L (ref 40–129)
ALT SERPL-CCNC: 30 U/L (ref 10–40)
ANION GAP SERPL CALCULATED.3IONS-SCNC: 12 MMOL/L (ref 7–16)
AST SERPL-CCNC: 27 IU/L (ref 15–37)
BILIRUB SERPL-MCNC: 0.9 MG/DL (ref 0–1)
BUN SERPL-MCNC: 21 MG/DL (ref 6–23)
CALCIUM SERPL-MCNC: 10 MG/DL (ref 8.3–10.6)
CHLORIDE BLD-SCNC: 102 MMOL/L (ref 99–110)
CHOLESTEROL, FASTING: 156 MG/DL
CO2: 25 MMOL/L (ref 21–32)
CREAT SERPL-MCNC: 1 MG/DL (ref 0.9–1.3)
GFR, ESTIMATED: 73 ML/MIN/1.73M2
GLUCOSE SERPL-MCNC: 88 MG/DL (ref 70–99)
HDLC SERPL-MCNC: 39 MG/DL
LDLC SERPL CALC-MCNC: 72 MG/DL
POTASSIUM SERPL-SCNC: 4.6 MMOL/L (ref 3.5–5.1)
SODIUM BLD-SCNC: 139 MMOL/L (ref 135–145)
TOTAL PROTEIN: 6.8 GM/DL (ref 6.4–8.2)
TRIGLYCERIDE, FASTING: 224 MG/DL

## 2024-07-10 PROCEDURE — 80053 COMPREHEN METABOLIC PANEL: CPT

## 2024-07-10 PROCEDURE — 36415 COLL VENOUS BLD VENIPUNCTURE: CPT

## 2024-07-10 PROCEDURE — 80061 LIPID PANEL: CPT

## 2024-07-19 ENCOUNTER — TELEPHONE (OUTPATIENT)
Dept: CARDIOLOGY CLINIC | Age: 89
End: 2024-07-19

## 2024-07-26 PROCEDURE — 93296 REM INTERROG EVL PM/IDS: CPT | Performed by: INTERNAL MEDICINE

## 2024-07-26 PROCEDURE — 93294 REM INTERROG EVL PM/LDLS PM: CPT | Performed by: INTERNAL MEDICINE

## 2024-07-29 ENCOUNTER — PROCEDURE VISIT (OUTPATIENT)
Dept: CARDIOLOGY CLINIC | Age: 89
End: 2024-07-29
Payer: MEDICARE

## 2024-07-29 DIAGNOSIS — Z95.0 CARDIAC PACEMAKER IN SITU: ICD-10-CM

## 2024-07-29 NOTE — TELEPHONE ENCOUNTER
Medication:   Requested Prescriptions     Pending Prescriptions Disp Refills    lisinopril-hydroCHLOROthiazide (PRINZIDE;ZESTORETIC) 20-12.5 MG per tablet [Pharmacy Med Name: LISIN-HCTZ 20-12.5 MG 20-12.5 Tablet] 90 tablet 1     Sig: TAKE 1 TABLET BY MOUTH DAILY    metoprolol tartrate (LOPRESSOR) 25 MG tablet [Pharmacy Med Name: METOPROLOL TARTRATE 25 MG T 25 Tablet] 180 tablet 1     Sig: TAKE 1 TABLET BY MOUTH 2 TIMES DAILY    atorvastatin (LIPITOR) 40 MG tablet [Pharmacy Med Name: ATORVASTATIN 40 MG TABLET 40 Tablet] 90 tablet 1     Sig: TAKE 1 TABLET BY MOUTH DAILY        Last Filled:      Patient Phone Number: [phone] (home)     Last appt: 6/7/2024   Next appt: 7/30/2024    Last OARRS:        No data to display

## 2024-07-30 RX ORDER — LISINOPRIL AND HYDROCHLOROTHIAZIDE 20; 12.5 MG/1; MG/1
1 TABLET ORAL DAILY
Qty: 90 TABLET | Refills: 1 | Status: SHIPPED | OUTPATIENT
Start: 2024-07-30

## 2024-07-30 RX ORDER — ATORVASTATIN CALCIUM 40 MG/1
40 TABLET, FILM COATED ORAL DAILY
Qty: 90 TABLET | Refills: 1 | Status: SHIPPED | OUTPATIENT
Start: 2024-07-30

## 2024-08-21 ENCOUNTER — OFFICE VISIT (OUTPATIENT)
Age: 89
End: 2024-08-21
Payer: MEDICARE

## 2024-08-21 VITALS
SYSTOLIC BLOOD PRESSURE: 122 MMHG | DIASTOLIC BLOOD PRESSURE: 68 MMHG | RESPIRATION RATE: 18 BRPM | BODY MASS INDEX: 34.13 KG/M2 | HEIGHT: 70 IN | OXYGEN SATURATION: 96 % | HEART RATE: 72 BPM | WEIGHT: 238.4 LBS

## 2024-08-21 DIAGNOSIS — I48.0 PAF (PAROXYSMAL ATRIAL FIBRILLATION) (HCC): Primary | ICD-10-CM

## 2024-08-21 DIAGNOSIS — D72.821 MONOCYTOSIS: ICD-10-CM

## 2024-08-21 DIAGNOSIS — I10 ESSENTIAL HYPERTENSION: ICD-10-CM

## 2024-08-21 DIAGNOSIS — Z95.0 CARDIAC PACEMAKER: ICD-10-CM

## 2024-08-21 DIAGNOSIS — E78.2 MIXED HYPERLIPIDEMIA: ICD-10-CM

## 2024-08-21 DIAGNOSIS — M85.80 OSTEOPENIA, UNSPECIFIED LOCATION: ICD-10-CM

## 2024-08-21 DIAGNOSIS — M17.0 PRIMARY OSTEOARTHRITIS OF BOTH KNEES: ICD-10-CM

## 2024-08-21 DIAGNOSIS — C43.9 MALIGNANT MELANOMA, UNSPECIFIED SITE (HCC): ICD-10-CM

## 2024-08-21 PROCEDURE — 99214 OFFICE O/P EST MOD 30 MIN: CPT | Performed by: INTERNAL MEDICINE

## 2024-08-21 PROCEDURE — G8417 CALC BMI ABV UP PARAM F/U: HCPCS | Performed by: INTERNAL MEDICINE

## 2024-08-21 PROCEDURE — 1123F ACP DISCUSS/DSCN MKR DOCD: CPT | Performed by: INTERNAL MEDICINE

## 2024-08-21 PROCEDURE — G8427 DOCREV CUR MEDS BY ELIG CLIN: HCPCS | Performed by: INTERNAL MEDICINE

## 2024-08-21 PROCEDURE — 1036F TOBACCO NON-USER: CPT | Performed by: INTERNAL MEDICINE

## 2024-08-21 NOTE — PROGRESS NOTES
Bernard Syed  Patient's  is 1935  Seen in office on 2024      SUBJECTIVE:  Bernard díaz 88 y.o.year old male presents today   Chief Complaint   Patient presents with    Follow-up     3 month f/u     Pt is here for f/u of HTN, HLD osteopenia , monocytosis , PAF     Patient has hypertension. Taking medications No headaches, no chest pain, no palpitations and no dizziness.    Patient has hyperlipidemia. Taking medications. No abdominal pain, no nausea or vomiting. No myalgias.  Pt increased the dose of omega 3 after seeing the results    Pt sees dermatologist for melanoma f/u .he is doing well    Pt sees Dr Negrete for monocytosis and is stable. He sees her once a year     Pt has PAF and is on eliquis . No bleeding . No bruising. No falls.     Taking medications regularly. No side effects noted.    Lab results reviewed     Review of Systems  Review of system is normal except as in HPI    OBJECTIVE: /68   Pulse 72   Resp 18   Ht 1.778 m (5' 10\")   Wt 108.1 kg (238 lb 6.4 oz)   SpO2 96%   BMI 34.21 kg/m²     Wt Readings from Last 3 Encounters:   24 108.1 kg (238 lb 6.4 oz)   24 111.6 kg (246 lb)   24 110.2 kg (243 lb)      GENERAL: - Alert, oriented, pleasant, in no apparent distress.    HEENT: - Conjunctiva pink, no scleral icterus. ENT clear.  NECK: -Supple.  No jugular venous distention noted. No masses felt,  CARDIOVASCULAR: - Normal S1 and S2    PULMONARY: - No respiratory distress.  No wheezes or rales.    ABDOMEN: - Soft and non-tender,no masses  ororganomegaly.  EXTREMITIES: - No cyanosis, clubbing, or significant edema.  SKIN: Skin is warm and dry.   NEUROLOGICAL: - Cranial nerves II through XII are grossly intact.      IMPRESSION:    Encounter Diagnoses   Name Primary?    PAF (paroxysmal atrial fibrillation) (HCC) Yes    Mixed hyperlipidemia     Monocytosis     Osteopenia, unspecified location     Primary osteoarthritis of both knees     Malignant melanoma, unspecified

## 2024-11-05 PROCEDURE — 93296 REM INTERROG EVL PM/IDS: CPT | Performed by: INTERNAL MEDICINE

## 2024-11-05 PROCEDURE — 93294 REM INTERROG EVL PM/LDLS PM: CPT | Performed by: INTERNAL MEDICINE

## 2024-12-03 ENCOUNTER — OFFICE VISIT (OUTPATIENT)
Age: 89
End: 2024-12-03
Payer: MEDICARE

## 2024-12-03 VITALS
RESPIRATION RATE: 16 BRPM | BODY MASS INDEX: 34.64 KG/M2 | WEIGHT: 241.4 LBS | DIASTOLIC BLOOD PRESSURE: 80 MMHG | HEART RATE: 72 BPM | SYSTOLIC BLOOD PRESSURE: 130 MMHG | OXYGEN SATURATION: 96 % | TEMPERATURE: 97.7 F

## 2024-12-03 DIAGNOSIS — E78.2 MIXED HYPERLIPIDEMIA: ICD-10-CM

## 2024-12-03 DIAGNOSIS — D72.821 MONOCYTOSIS: ICD-10-CM

## 2024-12-03 DIAGNOSIS — I10 ESSENTIAL HYPERTENSION: Primary | ICD-10-CM

## 2024-12-03 DIAGNOSIS — Z95.0 CARDIAC PACEMAKER: ICD-10-CM

## 2024-12-03 DIAGNOSIS — D68.69 SECONDARY HYPERCOAGULABLE STATE (HCC): ICD-10-CM

## 2024-12-03 DIAGNOSIS — C43.9 MALIGNANT MELANOMA, UNSPECIFIED SITE (HCC): ICD-10-CM

## 2024-12-03 DIAGNOSIS — I48.0 PAF (PAROXYSMAL ATRIAL FIBRILLATION) (HCC): ICD-10-CM

## 2024-12-03 PROCEDURE — 1159F MED LIST DOCD IN RCRD: CPT | Performed by: INTERNAL MEDICINE

## 2024-12-03 PROCEDURE — G8427 DOCREV CUR MEDS BY ELIG CLIN: HCPCS | Performed by: INTERNAL MEDICINE

## 2024-12-03 PROCEDURE — 99214 OFFICE O/P EST MOD 30 MIN: CPT | Performed by: INTERNAL MEDICINE

## 2024-12-03 PROCEDURE — 1036F TOBACCO NON-USER: CPT | Performed by: INTERNAL MEDICINE

## 2024-12-03 PROCEDURE — 1123F ACP DISCUSS/DSCN MKR DOCD: CPT | Performed by: INTERNAL MEDICINE

## 2024-12-03 PROCEDURE — G8482 FLU IMMUNIZE ORDER/ADMIN: HCPCS | Performed by: INTERNAL MEDICINE

## 2024-12-03 PROCEDURE — G8417 CALC BMI ABV UP PARAM F/U: HCPCS | Performed by: INTERNAL MEDICINE

## 2024-12-03 ASSESSMENT — ENCOUNTER SYMPTOMS
GASTROINTESTINAL NEGATIVE: 1
ALLERGIC/IMMUNOLOGIC NEGATIVE: 1
RESPIRATORY NEGATIVE: 1
EYES NEGATIVE: 1

## 2024-12-03 NOTE — PROGRESS NOTES
Hypertension     Melanoma (HCC) of neck and upper back 2016    Right side of neck below the right ear Upper back. Seen Dr. Denise in [place] Excised completely per pt.     Elaina (type) II atrioventricular block 2006    Osteopenia     Pacemaker at end of battery life 2016    Primary osteoarthritis of both knees 2016    DJD of both knees. No surgery. Seen Ortho at [place] in the past     S/P shoulder surgery 08/10/2021    Stage 3 chronic kidney disease (HCC) 2018     Past Surgical History:   Procedure Laterality Date    FRACTURE SURGERY      Had acute accident broken sternum & brused heart -Lt leg plate, -Lt wrist x 2, ?-Rt shoulder x 2, ?-Lt hip fracture with plates and rods    HIP SURGERY Left     JOINT REPLACEMENT  2021    PACEMAKER PLACEMENT  2006    PACEMAKER PLACEMENT  2016    Generator Changeout for End of Life    SKIN CANCER EXCISION  2015     Social History     Tobacco Use    Smoking status: Former     Current packs/day: 0.00     Average packs/day: 3.0 packs/day for 7.2 years (21.6 ttl pk-yrs)     Types: Cigarettes     Start date: 8/15/1956     Quit date: 10/27/1963     Years since quittin.1    Smokeless tobacco: Never    Tobacco comments:     Quit cold turkey   Substance Use Topics    Alcohol use: No       LAB REVIEW:  CBC:   Lab Results   Component Value Date/Time    WBC 9.2 2024 02:39 PM    HGB 15.0 2024 02:39 PM    HCT 45.3 2024 02:39 PM     2024 02:39 PM     Lipids:   Lab Results   Component Value Date    HDL 39 (L) 07/10/2024    LDLDIRECT 159 (H) 2022    TRIGLYCFAST 224 (H) 07/10/2024    CHOLFAST 156 07/10/2024     Renal:   Lab Results   Component Value Date/Time    BUN 21 07/10/2024 10:06 AM    CREATININE 1.0 07/10/2024 10:06 AM     07/10/2024 10:06 AM    K 4.6 07/10/2024 10:06 AM    ALKPHOS 57 07/10/2024 10:06 AM    ALT 30 07/10/2024 10:06 AM    AST 27 07/10/2024 10:06 AM    GLUCOSE 88

## 2025-01-23 ENCOUNTER — OFFICE VISIT (OUTPATIENT)
Age: 89
End: 2025-01-23

## 2025-01-23 ENCOUNTER — HOSPITAL ENCOUNTER (OUTPATIENT)
Age: 89
Setting detail: INFUSION SERIES
Discharge: HOME | End: 2025-01-23
Payer: MEDICARE

## 2025-01-23 VITALS
OXYGEN SATURATION: 99 % | SYSTOLIC BLOOD PRESSURE: 142 MMHG | WEIGHT: 241.8 LBS | BODY MASS INDEX: 34.62 KG/M2 | RESPIRATION RATE: 18 BRPM | DIASTOLIC BLOOD PRESSURE: 75 MMHG | HEART RATE: 73 BPM | HEIGHT: 70 IN | TEMPERATURE: 97.9 F

## 2025-01-23 DIAGNOSIS — D72.821 MONOCYTOSIS: Primary | ICD-10-CM

## 2025-01-23 DIAGNOSIS — D72.821 MONOCYTOSIS: ICD-10-CM

## 2025-01-23 LAB
BASOPHILS # BLD: 0.03 K/UL
BASOPHILS NFR BLD: 0 % (ref 0–1)
EOSINOPHIL # BLD: 0.15 K/UL
EOSINOPHILS RELATIVE PERCENT: 2 % (ref 0–3)
ERYTHROCYTE [DISTWIDTH] IN BLOOD BY AUTOMATED COUNT: 12.6 % (ref 11.7–14.9)
HCT VFR BLD AUTO: 46.5 % (ref 42–52)
HGB BLD-MCNC: 15.7 G/DL (ref 13.5–18)
IMM GRANULOCYTES # BLD AUTO: 0.04 K/UL
IMM GRANULOCYTES NFR BLD: 1 %
LYMPHOCYTES NFR BLD: 2.75 K/UL
LYMPHOCYTES RELATIVE PERCENT: 33 % (ref 24–44)
MCH RBC QN AUTO: 32.3 PG (ref 27–31)
MCHC RBC AUTO-ENTMCNC: 33.8 G/DL (ref 32–36)
MCV RBC AUTO: 95.7 FL (ref 78–100)
MONOCYTES NFR BLD: 1.07 K/UL
MONOCYTES NFR BLD: 13 % (ref 0–4)
NEUTROPHILS NFR BLD: 51 % (ref 36–66)
NEUTS SEG NFR BLD: 4.19 K/UL
PLATELET # BLD AUTO: 171 K/UL (ref 140–440)
PMV BLD AUTO: 9.7 FL (ref 7.5–11.1)
RBC # BLD AUTO: 4.86 M/UL (ref 4.6–6.2)
WBC OTHER # BLD: 8.2 K/UL (ref 4–10.5)

## 2025-01-23 PROCEDURE — 99211 OFF/OP EST MAY X REQ PHY/QHP: CPT

## 2025-01-23 PROCEDURE — 85025 COMPLETE CBC W/AUTO DIFF WBC: CPT

## 2025-01-23 PROCEDURE — 36415 COLL VENOUS BLD VENIPUNCTURE: CPT

## 2025-01-23 ASSESSMENT — PATIENT HEALTH QUESTIONNAIRE - PHQ9
SUM OF ALL RESPONSES TO PHQ QUESTIONS 1-9: 0
SUM OF ALL RESPONSES TO PHQ9 QUESTIONS 1 & 2: 0
SUM OF ALL RESPONSES TO PHQ QUESTIONS 1-9: 0
2. FEELING DOWN, DEPRESSED OR HOPELESS: NOT AT ALL
1. LITTLE INTEREST OR PLEASURE IN DOING THINGS: NOT AT ALL

## 2025-01-23 NOTE — PROGRESS NOTES
MA Rooming Questions  Patient: Bernard Syed  MRN: [id number]    Date: 1/23/2025        1. Do you have any new issues?   yes - R forearm squamous cell         2. Do you need any refills on medications?    no    3. Have you had any imaging done since your last visit?   no    4. Have you been hospitalized or seen in the emergency room since your last visit here?   no    5. Did the patient have a depression screening completed today? Yes    PHQ-9 Total Score: 0 (1/23/2025  9:58 AM)       PHQ-9 Given to (if applicable):               PHQ-9 Score (if applicable):                     [] Positive     []  Negative              Does question #9 need addressed (if applicable)                     [] Yes    []  No               Britt Perez MA      
He does not have anemia, thrombocytopenia or neutropenia.      I will repeat CBC today. If his blood counts are stable, I will continue with observation.    I will plan to see him back in 1 year. He does not have any significant symptoms at today visit.    Past Medical History:     Significant for  1.  Hypertension  2.  Hyperlipidemia  3.  [place] type II AV block status post dual-chamber pacemaker placement on August 12, 2016  4.  Chronic kidney disease  5.  Melanoma of neck and upper back status post excision  6.  Osteoarthritis    Past Surgery History:    Significant for  1.  Permanent pacemaker placement  2.  Skin cancer excision  3.  Left leg fracture surgery in 1953  4.  Left wrist surgery in 1994  5.  Left hip fracture surgery    Social History:   He is a former smoker and he quit smoking in 1963.  He used to smoke approximately 2 packs a day for 5 years.  He denies alcohol drinking or illicit drug abuse.    Family History:    Significant for breast cancer in his sister and daughter.    Allergies:  No known drug allergies.      Review of Systems:  \"Per interval history; otherwise 10 point ROS is negative.\"  His energy level is decent today and his sleep is fine. He denies fever, chills, night sweats, cough, shortness of breath, chest pain, hemoptysis or palpitations.  His bowel and bladder functions are normal. He doesn't have nausea, vomiting, abdominal pain, diarrhea, constipation, dysuria, loss of appetite, or weight loss.  He denies neuropathy and he doesn't have bleeding or clotting issues.  He denies any pain in his body. No anxiety or depression. The rest of the systems are unremarkable.     Vital Signs: BP (!) 142/75 (Site: Right Upper Arm, Position: Sitting, Cuff Size: Large Adult)   Pulse 73   Temp 97.9 °F (36.6 °C) (Infrared)   Resp 18   Ht 1.778 m (5' 10\")   Wt 109.7 kg (241 lb 12.8 oz)   SpO2 99%   BMI 34.69 kg/m²      Physical Exam:  CONSTITUTIONAL: awake, alert, cooperative, no apparent

## 2025-01-27 ENCOUNTER — TELEPHONE (OUTPATIENT)
Age: 89
End: 2025-01-27

## 2025-01-27 NOTE — TELEPHONE ENCOUNTER
LVM for pt with his 1 yr OV f/u @ the [place] office for 1/26/2026 @ 9:15.    Left direct line for pt to return call if this date/time does not work for him [phone]

## 2025-02-03 ENCOUNTER — OFFICE VISIT (OUTPATIENT)
Dept: CARDIOLOGY CLINIC | Age: 89
End: 2025-02-03
Payer: MEDICARE

## 2025-02-03 VITALS
SYSTOLIC BLOOD PRESSURE: 116 MMHG | BODY MASS INDEX: 34.53 KG/M2 | WEIGHT: 241.2 LBS | DIASTOLIC BLOOD PRESSURE: 82 MMHG | HEIGHT: 70 IN | HEART RATE: 66 BPM

## 2025-02-03 DIAGNOSIS — D68.69 SECONDARY HYPERCOAGULABLE STATE (HCC): ICD-10-CM

## 2025-02-03 DIAGNOSIS — I48.0 PAF (PAROXYSMAL ATRIAL FIBRILLATION) (HCC): Primary | ICD-10-CM

## 2025-02-03 DIAGNOSIS — E78.2 MIXED HYPERLIPIDEMIA: ICD-10-CM

## 2025-02-03 DIAGNOSIS — I10 ESSENTIAL HYPERTENSION: ICD-10-CM

## 2025-02-03 DIAGNOSIS — Z95.0 CARDIAC PACEMAKER: ICD-10-CM

## 2025-02-03 PROCEDURE — 93000 ELECTROCARDIOGRAM COMPLETE: CPT | Performed by: INTERNAL MEDICINE

## 2025-02-03 PROCEDURE — G8417 CALC BMI ABV UP PARAM F/U: HCPCS | Performed by: INTERNAL MEDICINE

## 2025-02-03 PROCEDURE — 99214 OFFICE O/P EST MOD 30 MIN: CPT | Performed by: INTERNAL MEDICINE

## 2025-02-03 PROCEDURE — 1159F MED LIST DOCD IN RCRD: CPT | Performed by: INTERNAL MEDICINE

## 2025-02-03 PROCEDURE — 1036F TOBACCO NON-USER: CPT | Performed by: INTERNAL MEDICINE

## 2025-02-03 PROCEDURE — 1123F ACP DISCUSS/DSCN MKR DOCD: CPT | Performed by: INTERNAL MEDICINE

## 2025-02-03 PROCEDURE — G8427 DOCREV CUR MEDS BY ELIG CLIN: HCPCS | Performed by: INTERNAL MEDICINE

## 2025-02-03 NOTE — ASSESSMENT & PLAN NOTE
PAF burden is less than 1%.  CHADS2 vascular score is 3.  Continue with Eliquis 5 mg bid since renal and hepatic function is normal and weight is > 65 kg.

## 2025-02-03 NOTE — PROGRESS NOTES
Bernard Syed  1935  Yesica Perez MD      Chief Complaint   Patient presents with    Hypertension    Hyperlipidemia    Follow-up     1 year follow up  No chest pains, SOB dizziness, swelling or palpitations       Chief complaint and HPI:  Bernard Syed  is a 89 y.o. male following up for paroxysmal atrial fibrillation hyperlipidemia hypertension.  Denies any palpitations.  Did not feel any palpitations on September 25 when he had a atrial fibrillation with controlled ventricular rate lasting for more than 9 hours as per pacer interrogation.  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  Medications are reviewed and he is compliant to his medications and pacer checks.    Rest of the Cardiovascular system review is otherwise unchanged from prior encounter.  Past medical history:  has a past medical history of Arthritis, Atrial fibrillation (HCC), Chronic back pain, DJD (degenerative joint disease) of knee, H/O 24 hour EKG monitoring, H/O cardiovascular stress test, H/O colonoscopy, H/O echocardiogram, History of pacemaker, Hyperlipidemia, Hypertension, Melanoma (HCC) of neck and upper back, [place]itz (type) II atrioventricular block, Osteopenia, Pacemaker at end of battery life, Primary osteoarthritis of both knees, S/P shoulder surgery, and Stage 3 chronic kidney disease (HCC).  Past surgical history:  has a past surgical history that includes fracture surgery; pacemaker placement (12/14/2006); Skin cancer excision (03/2015); pacemaker placement (08/03/2016); hip surgery (Left); and joint replacement (7/30/2021).  Social History:   Social History     Tobacco Use    Smoking status: Former     Current packs/day: 0.00     Average packs/day: 3.0 packs/day for 7.2 years (21.6 ttl pk-yrs)     Types:

## 2025-02-03 NOTE — PATIENT INSTRUCTIONS
Continue current cardiovascular medications which have been reviewed and discussed individually with you.  Continue device check as per care link schedule.   Appropriate prescriptions if needed on this visit are addressed. After visit summery is provided.   Questions answered and patient verbalizes understanding. Follow up in 12 months,  sooner if needed.

## 2025-02-03 NOTE — ASSESSMENT & PLAN NOTE
[id number] Score for Atrial Fibrillation Stroke Risk is 3 and on Eliquis for stroke prevention tolerating it well.

## 2025-02-10 PROCEDURE — 93294 REM INTERROG EVL PM/LDLS PM: CPT | Performed by: INTERNAL MEDICINE

## 2025-02-10 PROCEDURE — 93296 REM INTERROG EVL PM/IDS: CPT | Performed by: INTERNAL MEDICINE

## 2025-03-02 SDOH — ECONOMIC STABILITY: TRANSPORTATION INSECURITY
IN THE PAST 12 MONTHS, HAS THE LACK OF TRANSPORTATION KEPT YOU FROM MEDICAL APPOINTMENTS OR FROM GETTING MEDICATIONS?: NO

## 2025-03-02 SDOH — ECONOMIC STABILITY: FOOD INSECURITY: WITHIN THE PAST 12 MONTHS, THE FOOD YOU BOUGHT JUST DIDN'T LAST AND YOU DIDN'T HAVE MONEY TO GET MORE.: NEVER TRUE

## 2025-03-02 SDOH — ECONOMIC STABILITY: FOOD INSECURITY: WITHIN THE PAST 12 MONTHS, YOU WORRIED THAT YOUR FOOD WOULD RUN OUT BEFORE YOU GOT MONEY TO BUY MORE.: NEVER TRUE

## 2025-03-02 SDOH — ECONOMIC STABILITY: INCOME INSECURITY: IN THE LAST 12 MONTHS, WAS THERE A TIME WHEN YOU WERE NOT ABLE TO PAY THE MORTGAGE OR RENT ON TIME?: NO

## 2025-03-02 SDOH — ECONOMIC STABILITY: TRANSPORTATION INSECURITY
IN THE PAST 12 MONTHS, HAS LACK OF TRANSPORTATION KEPT YOU FROM MEETINGS, WORK, OR FROM GETTING THINGS NEEDED FOR DAILY LIVING?: NO

## 2025-03-04 ENCOUNTER — HOSPITAL ENCOUNTER (OUTPATIENT)
Age: 89
Discharge: HOME | End: 2025-03-04
Payer: MEDICARE

## 2025-03-04 ENCOUNTER — OFFICE VISIT (OUTPATIENT)
Age: 89
End: 2025-03-04

## 2025-03-04 VITALS
RESPIRATION RATE: 12 BRPM | OXYGEN SATURATION: 96 % | SYSTOLIC BLOOD PRESSURE: 110 MMHG | HEIGHT: 70 IN | TEMPERATURE: 97.8 F | DIASTOLIC BLOOD PRESSURE: 68 MMHG | BODY MASS INDEX: 34.53 KG/M2 | WEIGHT: 241.2 LBS | HEART RATE: 62 BPM

## 2025-03-04 VITALS
WEIGHT: 241.3 LBS | OXYGEN SATURATION: 96 % | HEIGHT: 70 IN | BODY MASS INDEX: 34.54 KG/M2 | DIASTOLIC BLOOD PRESSURE: 68 MMHG | TEMPERATURE: 97.8 F | RESPIRATION RATE: 12 BRPM | SYSTOLIC BLOOD PRESSURE: 110 MMHG | HEART RATE: 62 BPM

## 2025-03-04 DIAGNOSIS — Z95.0 CARDIAC PACEMAKER: ICD-10-CM

## 2025-03-04 DIAGNOSIS — E78.2 MIXED HYPERLIPIDEMIA: ICD-10-CM

## 2025-03-04 DIAGNOSIS — I10 ESSENTIAL HYPERTENSION: ICD-10-CM

## 2025-03-04 DIAGNOSIS — D72.821 MONOCYTOSIS: ICD-10-CM

## 2025-03-04 DIAGNOSIS — M85.80 OSTEOPENIA, UNSPECIFIED LOCATION: ICD-10-CM

## 2025-03-04 DIAGNOSIS — I48.0 PAF (PAROXYSMAL ATRIAL FIBRILLATION) (HCC): Primary | ICD-10-CM

## 2025-03-04 DIAGNOSIS — Z00.00 MEDICARE ANNUAL WELLNESS VISIT, SUBSEQUENT: Primary | ICD-10-CM

## 2025-03-04 DIAGNOSIS — C43.59 MALIGNANT MELANOMA OF TORSO EXCLUDING BREAST (HCC): ICD-10-CM

## 2025-03-04 LAB
ALBUMIN SERPL-MCNC: 4 G/DL (ref 3.4–5)
ALBUMIN/GLOB SERPL: 1.4 {RATIO} (ref 1.1–2.2)
ALP SERPL-CCNC: 56 U/L (ref 40–129)
ALT SERPL-CCNC: 29 U/L (ref 10–40)
ANION GAP SERPL CALCULATED.3IONS-SCNC: 10 MMOL/L (ref 4–16)
AST SERPL-CCNC: 29 U/L (ref 15–37)
BASOPHILS # BLD: 0.04 K/UL
BASOPHILS NFR BLD: 0 % (ref 0–1)
BILIRUB SERPL-MCNC: 0.6 MG/DL (ref 0–1)
BUN SERPL-MCNC: 20 MG/DL (ref 6–23)
CALCIUM SERPL-MCNC: 10.3 MG/DL (ref 8.3–10.6)
CHLORIDE SERPL-SCNC: 104 MMOL/L (ref 99–110)
CHOLEST SERPL-MCNC: 151 MG/DL (ref 125–199)
CO2 SERPL-SCNC: 27 MMOL/L (ref 21–32)
CREAT SERPL-MCNC: 1 MG/DL (ref 0.9–1.3)
EOSINOPHIL # BLD: 0.12 K/UL
EOSINOPHILS RELATIVE PERCENT: 1 % (ref 0–3)
ERYTHROCYTE [DISTWIDTH] IN BLOOD BY AUTOMATED COUNT: 12.4 % (ref 11.7–14.9)
GFR, ESTIMATED: 73 ML/MIN/1.73M2
GLUCOSE SERPL-MCNC: 91 MG/DL (ref 74–99)
HCT VFR BLD AUTO: 44.7 % (ref 42–52)
HDLC SERPL-MCNC: 39 MG/DL
HGB BLD-MCNC: 15.5 G/DL (ref 13.5–18)
IMM GRANULOCYTES # BLD AUTO: 0.03 K/UL
IMM GRANULOCYTES NFR BLD: 0 %
LDLC SERPL CALC-MCNC: 64 MG/DL
LYMPHOCYTES NFR BLD: 2.94 K/UL
LYMPHOCYTES RELATIVE PERCENT: 33 % (ref 24–44)
MCH RBC QN AUTO: 32.5 PG (ref 27–31)
MCHC RBC AUTO-ENTMCNC: 34.7 G/DL (ref 32–36)
MCV RBC AUTO: 93.7 FL (ref 78–100)
MONOCYTES NFR BLD: 1.16 K/UL
MONOCYTES NFR BLD: 13 % (ref 0–4)
NEUTROPHILS NFR BLD: 53 % (ref 36–66)
NEUTS SEG NFR BLD: 4.76 K/UL
PLATELET # BLD AUTO: 215 K/UL (ref 140–440)
PMV BLD AUTO: 9.8 FL (ref 7.5–11.1)
POTASSIUM SERPL-SCNC: 4.6 MMOL/L (ref 3.5–5.1)
PROT SERPL-MCNC: 6.8 G/DL (ref 6.4–8.2)
RBC # BLD AUTO: 4.77 M/UL (ref 4.6–6.2)
SODIUM SERPL-SCNC: 141 MMOL/L (ref 135–145)
TRIGL SERPL-MCNC: 241 MG/DL
WBC OTHER # BLD: 9.1 K/UL (ref 4–10.5)

## 2025-03-04 PROCEDURE — 36415 COLL VENOUS BLD VENIPUNCTURE: CPT

## 2025-03-04 PROCEDURE — 80061 LIPID PANEL: CPT

## 2025-03-04 PROCEDURE — 80053 COMPREHEN METABOLIC PANEL: CPT

## 2025-03-04 PROCEDURE — 85025 COMPLETE CBC W/AUTO DIFF WBC: CPT

## 2025-03-04 ASSESSMENT — LIFESTYLE VARIABLES
HOW OFTEN DO YOU HAVE A DRINK CONTAINING ALCOHOL: NEVER
HOW MANY STANDARD DRINKS CONTAINING ALCOHOL DO YOU HAVE ON A TYPICAL DAY: PATIENT DOES NOT DRINK

## 2025-03-04 ASSESSMENT — PATIENT HEALTH QUESTIONNAIRE - PHQ9
SUM OF ALL RESPONSES TO PHQ QUESTIONS 1-9: 0
1. LITTLE INTEREST OR PLEASURE IN DOING THINGS: NOT AT ALL
SUM OF ALL RESPONSES TO PHQ QUESTIONS 1-9: 0
2. FEELING DOWN, DEPRESSED OR HOPELESS: NOT AT ALL
SUM OF ALL RESPONSES TO PHQ QUESTIONS 1-9: 0
SUM OF ALL RESPONSES TO PHQ QUESTIONS 1-9: 0

## 2025-03-04 NOTE — PROGRESS NOTES
10/27/1961     Years since quittin.3    Smokeless tobacco: Never    Tobacco comments:     Quit cold turkey   Substance Use Topics    Alcohol use: No       LAB REVIEW:  CBC:   Lab Results   Component Value Date/Time    WBC 8.2 2025 10:18 AM    HGB 15.7 2025 10:18 AM    HCT 46.5 2025 10:18 AM     2025 10:18 AM     Lipids:   Lab Results   Component Value Date    HDL 39 (L) 07/10/2024    LDLDIRECT 159 (H) 2022    TRIGLYCFAST 224 (H) 07/10/2024    CHOLFAST 156 07/10/2024     Renal:   Lab Results   Component Value Date/Time    BUN 21 07/10/2024 10:06 AM    CREATININE 1.0 07/10/2024 10:06 AM     07/10/2024 10:06 AM    K 4.6 07/10/2024 10:06 AM    ALKPHOS 57 07/10/2024 10:06 AM    ALT 30 07/10/2024 10:06 AM    AST 27 07/10/2024 10:06 AM    GLUCOSE 88 07/10/2024 10:06 AM    GLUF 90 2022 11:50 AM     PT/INR:   Lab Results   Component Value Date/Time    INR 0.96 2016 10:00 AM     A1C: No results found for: \"LABA1C\"        Yesica Perez MD, 3/4/2025 , 10:11 AM

## 2025-03-04 NOTE — PATIENT INSTRUCTIONS
healthcare professional. [employer], disclaims any warranty or liability for your use of this information.         Starting a Weight-Loss Plan: Care Instructions  Overview    It can be a challenge to lose weight. But your doctor can help you make a weight-loss plan that meets your needs.  You don't have to make a lot of big changes at once. A better idea might be to focus on small changes and stick with them. When those changes become habit, you can add a few more changes.  Some people find it helpful to take an exercise or nutrition class. If you have questions, ask your doctor about seeing a registered dietitian or an exercise specialist. You might also think about joining a weight-loss support group.  If you're not ready to make changes right now, try to pick a date in the future. Then make an appointment with your doctor to talk about when and how you'll get started with a plan.  Follow-up care is a key part of your treatment and safety. Be sure to make and go to all appointments, and call your doctor if you are having problems. It's also a good idea to know your test results and keep a list of the medicines you take.  How can you care for yourself as you start a weight-loss plan?  Set realistic goals. Many people expect to lose much more weight than is likely. A weight loss of 5% to 10% of your body weight may be enough to improve your health.  Get family and friends involved to provide support. Talk to them about why you are trying to lose weight, and ask them to help. They can help by participating in exercise and having meals with you, even if they may be eating something different.  Find what works best for you. If you do not have time or do not like to cook, a program that offers meal replacement bars or shakes may be better for you. Or if you like to prepare meals, finding a plan that includes daily menus and recipes may be best.  Ask your doctor about other health professionals who can help

## 2025-03-04 NOTE — PROGRESS NOTES
eYsica Perez MD   metoprolol tartrate (LOPRESSOR) 25 MG tablet TAKE 1 TABLET BY MOUTH 2 TIMES DAILY  Yesica Perez MD   atorvastatin (LIPITOR) 40 MG tablet TAKE 1 TABLET BY MOUTH DAILY  Yesica Perez MD   fluorouracil (EFUDEX) 5 % cream Using once a day every other day for 6 weeks  Laci Alba MD   Omega-3 Fatty Acids (FISH OIL) 1000 MG CPDR Take 1 capsule by mouth 2 times daily  Yesica Perez MD   Cholecalciferol (VITAMIN D3) 2000 UNITS CAPS Take 1 capsule by mouth daily  Laci lAba MD   Calcium Citrate (CITRACAL PO) Take 2 tablets by mouth daily   Laci Alba MD   Multiple Vitamin (MULTIVITAMIN PO) Take  by mouth daily.  Laci Alba MD   GLUCOSAMINE HCL Take 1 tablet by mouth 2 times daily  Laci Alba MD       CareTeam (Including outside providers/suppliers regularly involved in providing care):   Patient Care Team:  Yesica Perez MD as PCP - General  Yesica Perez MD as PCP - Empaneled Provider  Dwayne Plascencia MD (Orthopedic Surgery)     Recommendations for Preventive Services Due: see orders and patient instructions/AVS.  Recommended screening schedule for the next 5-10 years is provided to the patient in written form: see Patient Instructions/AVS.     Reviewed and updated this visit:  Tobacco

## 2025-03-11 ENCOUNTER — RESULTS FOLLOW-UP (OUTPATIENT)
Age: 89
End: 2025-03-11

## 2025-04-15 RX ORDER — ATORVASTATIN CALCIUM 40 MG/1
40 TABLET, FILM COATED ORAL DAILY
Qty: 90 TABLET | Refills: 1 | Status: SHIPPED | OUTPATIENT
Start: 2025-04-15

## 2025-04-15 RX ORDER — LISINOPRIL AND HYDROCHLOROTHIAZIDE 12.5; 2 MG/1; MG/1
1 TABLET ORAL DAILY
Qty: 90 TABLET | Refills: 1 | Status: SHIPPED | OUTPATIENT
Start: 2025-04-15

## 2025-04-15 RX ORDER — METOPROLOL TARTRATE 25 MG/1
25 TABLET, FILM COATED ORAL 2 TIMES DAILY
Qty: 180 TABLET | Refills: 1 | Status: SHIPPED | OUTPATIENT
Start: 2025-04-15

## 2025-06-05 ENCOUNTER — OFFICE VISIT (OUTPATIENT)
Age: 89
End: 2025-06-05

## 2025-06-05 VITALS
RESPIRATION RATE: 16 BRPM | SYSTOLIC BLOOD PRESSURE: 130 MMHG | TEMPERATURE: 98.5 F | BODY MASS INDEX: 33.95 KG/M2 | HEART RATE: 68 BPM | DIASTOLIC BLOOD PRESSURE: 80 MMHG | WEIGHT: 236.6 LBS | OXYGEN SATURATION: 95 %

## 2025-06-05 DIAGNOSIS — I48.0 PAF (PAROXYSMAL ATRIAL FIBRILLATION) (HCC): ICD-10-CM

## 2025-06-05 DIAGNOSIS — M85.80 OSTEOPENIA, UNSPECIFIED LOCATION: ICD-10-CM

## 2025-06-05 DIAGNOSIS — I49.5 SICK SINUS SYNDROME (HCC): ICD-10-CM

## 2025-06-05 DIAGNOSIS — E78.2 MIXED HYPERLIPIDEMIA: ICD-10-CM

## 2025-06-05 DIAGNOSIS — M17.0 PRIMARY OSTEOARTHRITIS OF BOTH KNEES: ICD-10-CM

## 2025-06-05 DIAGNOSIS — I10 ESSENTIAL HYPERTENSION: Primary | ICD-10-CM

## 2025-06-05 DIAGNOSIS — D72.821 MONOCYTOSIS: ICD-10-CM

## 2025-06-05 ASSESSMENT — PATIENT HEALTH QUESTIONNAIRE - PHQ9
SUM OF ALL RESPONSES TO PHQ QUESTIONS 1-9: 0
1. LITTLE INTEREST OR PLEASURE IN DOING THINGS: NOT AT ALL
SUM OF ALL RESPONSES TO PHQ QUESTIONS 1-9: 0
SUM OF ALL RESPONSES TO PHQ QUESTIONS 1-9: 0
2. FEELING DOWN, DEPRESSED OR HOPELESS: NOT AT ALL
SUM OF ALL RESPONSES TO PHQ QUESTIONS 1-9: 0

## 2025-06-05 NOTE — PROGRESS NOTES
REPLACEMENT  2021    PACEMAKER PLACEMENT  2006    PACEMAKER PLACEMENT  2016    Generator Changeout for End of Life    SKIN CANCER EXCISION  2015     Social History     Tobacco Use    Smoking status: Former     Current packs/day: 0.00     Average packs/day: 3.0 packs/day for 7.2 years (21.6 ttl pk-yrs)     Types: Cigarettes     Start date: 8/15/1956     Quit date: 10/27/1963     Years since quittin.6    Smokeless tobacco: Never    Tobacco comments:     Quit cold turkey   Substance Use Topics    Alcohol use: No       LAB REVIEW:  CBC:   Lab Results   Component Value Date/Time    WBC 9.1 2025 10:45 AM    HGB 15.5 2025 10:45 AM    HCT 44.7 2025 10:45 AM     2025 10:45 AM     Lipids:   Lab Results   Component Value Date    HDL 39 (L) 2025    LDLDIRECT 159 (H) 2022    TRIGLYCFAST 241 (H) 2025    CHOLFAST 151 2025     Renal:   Lab Results   Component Value Date/Time    BUN 20 2025 10:45 AM    CREATININE 1.0 2025 10:45 AM     2025 10:45 AM    K 4.6 2025 10:45 AM    ALKPHOS 56 2025 10:45 AM    ALT 29 2025 10:45 AM    AST 29 2025 10:45 AM    GLUCOSE 91 2025 10:45 AM    GLUF 90 2022 11:50 AM     PT/INR:   Lab Results   Component Value Date/Time    INR 0.96 2016 10:00 AM     A1C: No results found for: \"LABA1C\"        Yesica Perez MD, 2025 , 11:33 AM

## 2025-06-30 ENCOUNTER — TELEPHONE (OUTPATIENT)
Dept: CARDIOLOGY CLINIC | Age: 89
End: 2025-06-30

## 2025-07-02 PROCEDURE — 93296 REM INTERROG EVL PM/IDS: CPT | Performed by: INTERNAL MEDICINE

## 2025-07-02 PROCEDURE — 93294 REM INTERROG EVL PM/LDLS PM: CPT | Performed by: INTERNAL MEDICINE

## 2025-08-14 ENCOUNTER — HOSPITAL ENCOUNTER (OUTPATIENT)
Dept: LAB | Age: 89
Discharge: HOME | End: 2025-08-14
Payer: MEDICARE

## 2025-08-14 DIAGNOSIS — I10 ESSENTIAL HYPERTENSION: ICD-10-CM

## 2025-08-14 DIAGNOSIS — D72.821 MONOCYTOSIS: ICD-10-CM

## 2025-08-14 LAB
ALBUMIN SERPL-MCNC: 4 G/DL (ref 3.4–5)
ALBUMIN/GLOB SERPL: 1.5 {RATIO}
ALP SERPL-CCNC: 59 U/L (ref 40–129)
ALT SERPL-CCNC: 25 U/L (ref 10–40)
ANION GAP SERPL CALCULATED.3IONS-SCNC: 10 MMOL/L (ref 9–17)
AST SERPL-CCNC: 28 U/L (ref 15–37)
BASOPHILS # BLD: 0.03 K/UL
BASOPHILS NFR BLD: 0 % (ref 0–1)
BILIRUB SERPL-MCNC: 0.6 MG/DL (ref 0–1)
BUN SERPL-MCNC: 18 MG/DL (ref 7–20)
CALCIUM SERPL-MCNC: 9.7 MG/DL (ref 8.3–10.6)
CHLORIDE SERPL-SCNC: 102 MMOL/L (ref 99–110)
CHOLEST SERPL-MCNC: 156 MG/DL (ref 125–199)
CO2 SERPL-SCNC: 27 MMOL/L (ref 21–32)
CREAT SERPL-MCNC: 0.9 MG/DL (ref 0.8–1.3)
EOSINOPHIL # BLD: 0.23 K/UL
EOSINOPHILS RELATIVE PERCENT: 2 % (ref 0–3)
ERYTHROCYTE [DISTWIDTH] IN BLOOD BY AUTOMATED COUNT: 12.8 % (ref 11.7–14.9)
GFR, ESTIMATED: 83 ML/MIN/1.73M2
GLUCOSE SERPL-MCNC: 92 MG/DL (ref 74–99)
HCT VFR BLD AUTO: 45.2 % (ref 42–52)
HDLC SERPL-MCNC: 36 MG/DL
HGB BLD-MCNC: 15.2 G/DL (ref 13.5–18)
IMM GRANULOCYTES # BLD AUTO: 0.04 K/UL
IMM GRANULOCYTES NFR BLD: 0 %
LDLC SERPL CALC-MCNC: 71 MG/DL
LYMPHOCYTES NFR BLD: 2.78 K/UL
LYMPHOCYTES RELATIVE PERCENT: 29 % (ref 24–44)
MCH RBC QN AUTO: 32.1 PG (ref 27–31)
MCHC RBC AUTO-ENTMCNC: 33.6 G/DL (ref 32–36)
MCV RBC AUTO: 95.6 FL (ref 78–100)
MONOCYTES NFR BLD: 1.12 K/UL
MONOCYTES NFR BLD: 12 % (ref 0–5)
NEUTROPHILS NFR BLD: 56 % (ref 36–66)
NEUTS SEG NFR BLD: 5.3 K/UL
PLATELET # BLD AUTO: 168 K/UL (ref 140–440)
PMV BLD AUTO: 9.7 FL (ref 7.5–11.1)
POTASSIUM SERPL-SCNC: 4 MMOL/L (ref 3.5–5.1)
PROT SERPL-MCNC: 6.6 G/DL (ref 6.4–8.2)
RBC # BLD AUTO: 4.73 M/UL (ref 4.6–6.2)
SODIUM SERPL-SCNC: 139 MMOL/L (ref 136–145)
TRIGL SERPL-MCNC: 246 MG/DL
WBC OTHER # BLD: 9.5 K/UL (ref 4–10.5)

## 2025-08-14 PROCEDURE — 80053 COMPREHEN METABOLIC PANEL: CPT

## 2025-08-14 PROCEDURE — 36415 COLL VENOUS BLD VENIPUNCTURE: CPT

## 2025-08-14 PROCEDURE — 80061 LIPID PANEL: CPT

## 2025-08-14 PROCEDURE — 85025 COMPLETE CBC W/AUTO DIFF WBC: CPT

## 2025-09-04 ENCOUNTER — OFFICE VISIT (OUTPATIENT)
Age: 89
End: 2025-09-04

## 2025-09-04 VITALS
SYSTOLIC BLOOD PRESSURE: 118 MMHG | RESPIRATION RATE: 18 BRPM | OXYGEN SATURATION: 97 % | DIASTOLIC BLOOD PRESSURE: 68 MMHG | BODY MASS INDEX: 32.41 KG/M2 | HEIGHT: 70 IN | WEIGHT: 226.4 LBS | HEART RATE: 67 BPM

## 2025-09-04 DIAGNOSIS — I10 ESSENTIAL HYPERTENSION: Primary | ICD-10-CM

## 2025-09-04 DIAGNOSIS — D68.69 SECONDARY HYPERCOAGULABLE STATE: ICD-10-CM

## 2025-09-04 DIAGNOSIS — E78.2 MIXED HYPERLIPIDEMIA: ICD-10-CM

## 2025-09-04 DIAGNOSIS — M85.80 OSTEOPENIA, UNSPECIFIED LOCATION: ICD-10-CM

## 2025-09-04 DIAGNOSIS — Z95.0 CARDIAC PACEMAKER: ICD-10-CM

## 2025-09-04 DIAGNOSIS — D72.821 MONOCYTOSIS: ICD-10-CM

## 2025-09-04 DIAGNOSIS — I48.0 PAF (PAROXYSMAL ATRIAL FIBRILLATION) (HCC): ICD-10-CM

## 2025-09-04 DIAGNOSIS — C43.59 MALIGNANT MELANOMA OF TORSO EXCLUDING BREAST (HCC): ICD-10-CM

## 2025-09-04 RX ORDER — METOPROLOL TARTRATE 25 MG/1
25 TABLET, FILM COATED ORAL 2 TIMES DAILY
Qty: 180 TABLET | Refills: 1 | Status: SHIPPED | OUTPATIENT
Start: 2025-09-04

## 2025-09-04 RX ORDER — ATORVASTATIN CALCIUM 40 MG/1
40 TABLET, FILM COATED ORAL DAILY
Qty: 90 TABLET | Refills: 1 | Status: SHIPPED | OUTPATIENT
Start: 2025-09-04

## 2025-09-04 RX ORDER — LISINOPRIL AND HYDROCHLOROTHIAZIDE 12.5; 2 MG/1; MG/1
1 TABLET ORAL DAILY
Qty: 90 TABLET | Refills: 1 | Status: SHIPPED | OUTPATIENT
Start: 2025-09-04

## 2025-09-04 RX ORDER — FLUOROURACIL 50 MG/G
CREAM TOPICAL
COMMUNITY
Start: 2025-07-08